# Patient Record
Sex: FEMALE | Race: WHITE | Employment: PART TIME | URBAN - METROPOLITAN AREA
[De-identification: names, ages, dates, MRNs, and addresses within clinical notes are randomized per-mention and may not be internally consistent; named-entity substitution may affect disease eponyms.]

---

## 2018-05-08 ENCOUNTER — TELEPHONE (OUTPATIENT)
Dept: FAMILY MEDICINE CLINIC | Age: 48
End: 2018-05-08

## 2018-05-08 NOTE — TELEPHONE ENCOUNTER
Ila Mata called to schedule a new patient appointment with Dr. Teresa Reese. Ila has a friend that is a patient of Dr. Teresa Reese' (Zoë Vuong)    Since Dr. Teresa Reese is only accepting Family Members of Current Patients and [phone] - approval is needed in order for Ila to be scheduled. Please advise - Best contact number is [phone]. Thank you.

## 2018-06-11 ENCOUNTER — HOSPITAL ENCOUNTER (OUTPATIENT)
Dept: NON INVASIVE DIAGNOSTICS | Age: 48
Discharge: HOME | End: 2018-06-11
Attending: FAMILY MEDICINE | Admitting: FAMILY MEDICINE

## 2018-06-11 DIAGNOSIS — R53.81 OTHER MALAISE: ICD-10-CM

## 2018-08-27 ENCOUNTER — HOSPITAL ENCOUNTER (OUTPATIENT)
Dept: WOUND CARE | Age: 48
Discharge: HOME | End: 2018-08-27
Payer: COMMERCIAL

## 2018-08-27 VITALS
DIASTOLIC BLOOD PRESSURE: 90 MMHG | HEART RATE: 89 BPM | SYSTOLIC BLOOD PRESSURE: 160 MMHG | RESPIRATION RATE: 18 BRPM | HEIGHT: 71 IN | TEMPERATURE: 98.7 F | WEIGHT: 280 LBS | BODY MASS INDEX: 39.2 KG/M2

## 2018-08-27 DIAGNOSIS — I89.0 LYMPHEDEMA OF LEFT LOWER EXTREMITY: ICD-10-CM

## 2018-08-27 PROCEDURE — 99203 OFFICE O/P NEW LOW 30 MIN: CPT | Performed by: SPECIALIST

## 2018-08-27 PROCEDURE — 99203 OFFICE O/P NEW LOW 30 MIN: CPT

## 2018-08-27 RX ORDER — IBUPROFEN 200 MG
400 TABLET ORAL PRN
COMMUNITY
End: 2020-08-17

## 2018-08-27 RX ORDER — OMEPRAZOLE 40 MG/1
40 CAPSULE, DELAYED RELEASE ORAL DAILY
COMMUNITY
Start: 2018-05-30 | End: 2019-01-29 | Stop reason: SDUPTHER

## 2018-08-27 RX ORDER — LOSARTAN POTASSIUM AND HYDROCHLOROTHIAZIDE 12.5; 5 MG/1; MG/1
1 TABLET ORAL DAILY
COMMUNITY
Start: 2018-05-07 | End: 2019-01-29 | Stop reason: SDUPTHER

## 2018-08-27 RX ORDER — CEPHALEXIN 500 MG/1
500 CAPSULE ORAL 2 TIMES DAILY
COMMUNITY
Start: 2018-08-26 | End: 2018-09-03

## 2018-08-27 RX ORDER — ATORVASTATIN CALCIUM 20 MG/1
20 TABLET, FILM COATED ORAL
COMMUNITY
Start: 2018-05-07 | End: 2019-01-29 | Stop reason: SDUPTHER

## 2018-08-27 RX ORDER — FLUTICASONE PROPIONATE 50 MCG
1 SPRAY, SUSPENSION (ML) NASAL PRN
COMMUNITY
Start: 2016-04-18 | End: 2019-09-25

## 2018-08-27 NOTE — PROGRESS NOTES
[address]  Progress Note and Procedure Note      Stacie Perkins  MEDICAL RECORD NUMBER:  [id number]  AGE: 52 y.o. GENDER: female  : 1970  EPISODE DATE:  2018      Subjective:     Chief Complaint   Patient presents with    Wound Check     initial visit         HISTORY of PRESENT ILLNESS HPI     Stacie Perkins is a 52 y.o. female who presents today for wound evaluation. History of Wound Context: Patient was recently discharged from [facility] for cellulitis of the left lower extremity. She has suffered from lymphedema since age 5 since surgery to left groin for benign tumor. She is presently on oral antibiotics. Her leg has no signs of cellulitis but remains more edematous because of her inability to use her lymphedema pumps in the face of recent infection. She is normally in good health. Wound Pain Timing/Severity: none  Quality of pain: N/A  Severity:  0 / 10   Modifying Factors: None  Associated Signs/Symptoms: edema    Wound Identification:  Wound Type: lymphedema  Contributing Factors: obesity        PAST MEDICAL HISTORY        Diagnosis Date    Hypertension        PAST SURGICAL HISTORY    Past Surgical History:   Procedure Laterality Date    BLADDER SURGERY       SECTION      x2    ECTOPIC PREGNANCY SURGERY      LYMPH NODE DISSECTION      removal of lymph node removal left groin    OTHER SURGICAL HISTORY      septum removal in uterus       FAMILY HISTORY    No family history on file. SOCIAL HISTORY    Social History   Substance Use Topics    Smoking status: Not on file    Smokeless tobacco: Not on file    Alcohol use Not on file       ALLERGIES    No Known Allergies    MEDICATIONS    No current outpatient prescriptions on file prior to encounter. No current facility-administered medications on file prior to encounter. REVIEW OF SYSTEMS    Pertinent items are noted in HPI.       Objective:      BP (!) 160/90   Pulse 89   Temp 98.7 °F (37.1 °C) (Oral)   Resp 18   Ht 5' 11\" (1.803 m)   Wt 280 lb (127 kg)   BMI 39.05 kg/m²     PHYSICAL EXAM    General Appearance: alert and oriented to person, place and time, well-developed and well-nourished, in no acute distress  Skin: warm and dry, no rash or erythema  Neck: neck supple and non tender without mass, no thyromegaly or thyroid nodules, no cervical lymphadenopathy   Pulmonary/Chest: clear to auscultation bilaterally- no wheezes, rales or rhonchi, normal air movement, no respiratory distress  Cardiovascular: normal rate, normal S1 and S2, no gallops and no carotid bruits  Abdomen: soft, non-tender, non-distended, normal bowel sounds, no masses or organomegaly  Extremities: no cyanosis, no clubbing and 3+nonpitting  edema,fibrosis LLE extremity extending from toe to groin  left lower extremity. Assessment:     Patient has long standing Stage 2 Lymphedema of the LLE. There are no wounds and cellulitis has resolved. I will try to find patient a PCP for this patient and speak to lymphedema pump representative. Plan:       Treatment Note please see attached Discharge Instructions      Is this Patient a candidate for HBO: No    Weight Management: No. N/A    Written Patient Dismissal Instructions Given       Patient is to return to wound care center in:  1 week(s)    Discharge [facility] Physician Orders and Discharge Instructions  The [address]  [address] [facility], [facility] [address] [facility]  Telephone: [phone]    NAME:  Soledad Mayo  YOB: 1970  MEDICAL RECORD NUMBER:  [id number]  DATE:  8/27/2018       Edema Control:  Apply: [x] Compression Stocking []Right Leg [x]Left Leg     [] Double Layer Medigrip/Single Layer Spandagrip       []Right Leg  []Left Leg        []Low compression 5-10 mm/Hg      []Medium compression 10-20 mm/Hg     []High compression  20-30 mm/Hg    Apply every morning immediately when getting up. They should be applied to affected leg(s) from mid foot to knee making sure to cover the heel. Remove every night before going to bed. [x] Elevate leg(s) above the level of the heart for 30 minutes 4-5 times a day and/or when sitting. [x] Avoid prolonged standing in one place. Lymphedema Therapy: as soon as tolerated start lymphedema pumps   [x] Wear Lymphedema pumps twice a day at settings: as at home      [x]Elevate leg(s) above the level of the heart for 30 minutes 4-5 times a day and/or when sitting. [x] Avoid prolonged standing in one place. Dietary:  Important dietary reminders:  1. Increase Protein intake (i.e. Lean meats, fish, eggs, legumes, and yogurt)  2. No added salt  3. If diabetic, follow a diabetic diet and check glucose prior to meals or as instructed by your physician. Dietary Supplements:  [] Ensure Gleen Amparo [] Fei [] 30ml ProMod/ProStat   [] Other:   Take supplements twice a day or as directed as followed:       Return Appointment:  [] Wound and dressing supply provider:   [] ECF or Home Healthcare:  [] Nurse visit:    [x] Return Appointment: With Dr. Ben Puente as needed    Your nurse  is:  Aliyah     Electronically signed by Monie Sutherland RN on 8/27/2018 at [facility]: Should you experience any significant changes in your wound(s) or have questions about your wound care, please contact the [address] [place] [address] at [phone] Monday-Friday from 8:00 am - 4:30 pm except for Wednesdays which hours are from 8:00 am - 2:00 pm.   If you need help with your wound outside these hours and cannot wait until we are again available, contact your PCP or go to the hospital emergency room. PLEASE NOTE: IF YOU ARE UNABLE TO OBTAIN WOUND SUPPLIES, CONTINUE TO USE THE SUPPLIES YOU HAVE AVAILABLE UNTIL YOU ARE ABLE TO REACH US. IT IS MOST IMPORTANT TO KEEP THE WOUND COVERED AT ALL TIMES.       Physician orders by:     [] Dr Faustina Ley [] Dr Vladimir Richardson    [] Dr Minnie Tuttle     [] Dr Jackie Vincent   [x] Dr Germaine Whitten  [] Dr Dasia Vargas  [] Dr Froylan Hudson       Physician Signature:__________________________________        The information contained in the After Visit Summary has been reviewed with me, the patient and/or responsible adult, by my health care provider(s). I had the opportunity to ask questions regarding this information.   I have elected to receive;      [] Patient unable to sign Discharge Instructions given to ECF/Transportation/POA        Electronically signed by Selwyn Estrada MD on 8/27/2018 at 4:23 PM

## 2018-08-28 PROBLEM — I89.0 LYMPHEDEMA OF LEFT LOWER EXTREMITY: Status: ACTIVE | Noted: 2018-08-28

## 2018-08-28 NOTE — TELEPHONE ENCOUNTER
Patient called to schedule a new patient appointment and I informed her that Dr. Lia Blank was not accepting anymore new patients. Patient states Dr. Lia Blank spoke with Dr. Behzad Horta and gave an okay. Please advise. Thanks.

## 2018-09-17 ENCOUNTER — OFFICE VISIT (OUTPATIENT)
Dept: FAMILY MEDICINE CLINIC | Age: 48
End: 2018-09-17

## 2018-09-17 VITALS
RESPIRATION RATE: 18 BRPM | DIASTOLIC BLOOD PRESSURE: 78 MMHG | WEIGHT: 265.2 LBS | OXYGEN SATURATION: 98 % | HEART RATE: 77 BPM | SYSTOLIC BLOOD PRESSURE: 124 MMHG | HEIGHT: 70 IN | TEMPERATURE: 98.3 F | BODY MASS INDEX: 37.97 KG/M2

## 2018-09-17 DIAGNOSIS — Z23 NEED FOR INFLUENZA VACCINATION: ICD-10-CM

## 2018-09-17 DIAGNOSIS — N30.00 ACUTE CYSTITIS WITHOUT HEMATURIA: ICD-10-CM

## 2018-09-17 DIAGNOSIS — R30.0 DYSURIA: ICD-10-CM

## 2018-09-17 DIAGNOSIS — I10 ESSENTIAL HYPERTENSION, BENIGN: Primary | ICD-10-CM

## 2018-09-17 DIAGNOSIS — Z83.49 FH: HEMOCHROMATOSIS: ICD-10-CM

## 2018-09-17 DIAGNOSIS — I89.0 LYMPHEDEMA OF LEFT LOWER EXTREMITY: ICD-10-CM

## 2018-09-17 PROCEDURE — 90686 IIV4 VACC NO PRSV 0.5 ML IM: CPT | Performed by: FAMILY MEDICINE

## 2018-09-17 PROCEDURE — 99203 OFFICE O/P NEW LOW 30 MIN: CPT | Performed by: FAMILY MEDICINE

## 2018-09-17 PROCEDURE — 90471 IMMUNIZATION ADMIN: CPT | Performed by: FAMILY MEDICINE

## 2018-09-17 RX ORDER — SULFAMETHOXAZOLE AND TRIMETHOPRIM 800; 160 MG/1; MG/1
1 TABLET ORAL 2 TIMES DAILY
Qty: 10 TABLET | Refills: 0 | Status: SHIPPED | OUTPATIENT
Start: 2018-09-17 | End: 2018-09-19 | Stop reason: SINTOL

## 2018-09-17 RX ORDER — NITROFURANTOIN 25; 75 MG/1; MG/1
100 CAPSULE ORAL 2 TIMES DAILY
COMMUNITY
End: 2018-11-20 | Stop reason: ALTCHOICE

## 2018-09-17 ASSESSMENT — ENCOUNTER SYMPTOMS
ABDOMINAL PAIN: 0
ANAL BLEEDING: 0
SHORTNESS OF BREATH: 0
NAUSEA: 0
CONSTIPATION: 0
WHEEZING: 0
VOICE CHANGE: 0
CHOKING: 0
DIARRHEA: 0
CHEST TIGHTNESS: 0
BLOOD IN STOOL: 0
SORE THROAT: 0
TROUBLE SWALLOWING: 0

## 2018-09-17 ASSESSMENT — PATIENT HEALTH QUESTIONNAIRE - PHQ9
1. LITTLE INTEREST OR PLEASURE IN DOING THINGS: 0
SUM OF ALL RESPONSES TO PHQ QUESTIONS 1-9: 0
SUM OF ALL RESPONSES TO PHQ9 QUESTIONS 1 & 2: 0
2. FEELING DOWN, DEPRESSED OR HOPELESS: 0
SUM OF ALL RESPONSES TO PHQ QUESTIONS 1-9: 0

## 2018-09-17 NOTE — PROGRESS NOTES
Subjective:      Patient ID: Nitesh Gonzalez is a 52 y.o. female is here to establish as a new pt. She has lymphedema, hyperlipidemia,  and HTN    HPI  Is originally from [facility]). Has 15 and 14 yo kids. . Has PAP annually. Has appt in October. Dr. Deanna Majano    Had lipoma removed from the left groin in [place] Unfortunately a lymph node dissection was also done and she has chronic lymphedema. Has been well managed for many years with massage and compression stocking. Was admitted to [facility] on 8/23 with celitis of the left leg. Resolved. Was off antib about 10 days when developed dysuria, urgency. No vaginal discharge or itching. She did a home dip and it was + for UTI. She did an on-line visit and was treated with [facility]. On Macrobid x 3 days. Is not feeling any better. Weight is down 15 lbs   Was volume overloaded at discharge from the hospital.     Has mild heaviness in her chest since in the hospital.  She thinks she has a bit of a cold. Nasal congestion, PND. No cough fever. Chest pain is not worse with exertion. Hypertension: Patient here for follow-up of elevated blood pressure. She is exercising and is adherent to low salt diet. Blood pressure is well controlled at home. 120/80. Cardiac symptoms none. Patient denies irregular heart beat, lower extremity edema, near-syncope and paroxysmal nocturnal dyspnea. Cardiovascular risk factors: dyslipidemia, hypertension and obesity (BMI >= 30 kg/m2). Use of agents associated with hypertension: none. History of target organ damage: none. Hyperlipidemia:  No new myalgias or GI upset on atorvastatin (Lipitor). Medication compliance: compliant all of the time. Patient is  following a low fat, low cholesterol diet. She is  exercising regularly. Had normal labs in July 2018.      No results found for: CHOL, TRIG, HDL, LDLCALC, LDLDIRECT  No results found for: ALT, AST         Health Maintenance   Topic Date Due    Potassium unexpected weight change. HENT: Positive for tinnitus. Negative for congestion, hearing loss, nosebleeds, sore throat, trouble swallowing and voice change. Occ tinnitus   Eyes: Negative for visual disturbance. Respiratory: Negative for choking, chest tightness, shortness of breath and wheezing. Cardiovascular: Negative for chest pain, palpitations and leg swelling. Gastrointestinal: Negative for abdominal pain, anal bleeding, blood in stool, constipation, diarrhea and nausea. Genitourinary: Positive for dysuria and urgency. Negative for flank pain, frequency, hematuria, pelvic pain, vaginal bleeding and vaginal discharge. Musculoskeletal: Positive for arthralgias. Negative for myalgias. Mild aches in the fingers. Has nodules in the DIP. Mild sxs. Skin: Negative for rash. Neurological: Negative for weakness, light-headedness and headaches. Hematological: Negative for adenopathy. Does not bruise/bleed easily. Psychiatric/Behavioral: Negative for dysphoric mood and sleep disturbance. The patient is not nervous/anxious. Objective:   Physical Exam  .  Vitals:    09/17/18 1247   BP: (!) 149/87   Pulse: 77   Resp: 18   Temp: 98.3 °F (36.8 °C)   SpO2: 98%       Physical Exam  NAD   mood and affect are normal.   Skin is warm and dry. Well hydrated, no rash. Ear exam - bilateral normal, TM intact without perforation or effusion, external canal normal. No significant ceruminosis noted. Nasal exam; septum midline, no deformities, nares patent, normal mucosa without swelling, no polyps, no bleeding. Pharynx normal, no oral lesions, dentition in good repair. The neck is supple and free of adenopathy or masses, the thyroid is normal without enlargement or nodules. Chest is clear, no wheezing or rales. Normal symmetric air entry throughout both lung fields.    Heart regular with normal rate, no murmer or gallop  PMI is not displaced, no heave or thrill  The abdomen is soft without

## 2018-09-18 ENCOUNTER — PATIENT MESSAGE (OUTPATIENT)
Dept: FAMILY MEDICINE CLINIC | Age: 48
End: 2018-09-18

## 2018-09-19 LAB — URINE CULTURE, ROUTINE: NORMAL

## 2018-09-19 RX ORDER — CEPHALEXIN 500 MG/1
500 CAPSULE ORAL 3 TIMES DAILY
Qty: 15 CAPSULE | Refills: 0 | Status: SHIPPED | OUTPATIENT
Start: 2018-09-19 | End: 2018-09-24

## 2018-11-20 ENCOUNTER — OFFICE VISIT (OUTPATIENT)
Dept: FAMILY MEDICINE CLINIC | Age: 48
End: 2018-11-20
Payer: COMMERCIAL

## 2018-11-20 VITALS
HEART RATE: 88 BPM | RESPIRATION RATE: 12 BRPM | BODY MASS INDEX: 37.52 KG/M2 | SYSTOLIC BLOOD PRESSURE: 153 MMHG | WEIGHT: 268 LBS | HEIGHT: 71 IN | DIASTOLIC BLOOD PRESSURE: 88 MMHG | TEMPERATURE: 97.7 F | OXYGEN SATURATION: 98 %

## 2018-11-20 DIAGNOSIS — R07.89 CHEST TIGHTNESS: Primary | ICD-10-CM

## 2018-11-20 DIAGNOSIS — R00.2 PALPITATIONS: ICD-10-CM

## 2018-11-20 DIAGNOSIS — S29.019A THORACIC MYOFASCIAL STRAIN, INITIAL ENCOUNTER: ICD-10-CM

## 2018-11-20 DIAGNOSIS — R06.09 DYSPNEA ON EXERTION: ICD-10-CM

## 2018-11-20 DIAGNOSIS — S39.012A STRAIN OF LUMBAR REGION, INITIAL ENCOUNTER: ICD-10-CM

## 2018-11-20 DIAGNOSIS — D50.9 IRON DEFICIENCY ANEMIA, UNSPECIFIED IRON DEFICIENCY ANEMIA TYPE: ICD-10-CM

## 2018-11-20 DIAGNOSIS — Z13.220 LIPID SCREENING: ICD-10-CM

## 2018-11-20 PROCEDURE — 93000 ELECTROCARDIOGRAM COMPLETE: CPT | Performed by: FAMILY MEDICINE

## 2018-11-20 PROCEDURE — 99214 OFFICE O/P EST MOD 30 MIN: CPT | Performed by: FAMILY MEDICINE

## 2018-11-20 NOTE — PATIENT INSTRUCTIONS
medical condition or this instruction, always ask your healthcare professional. [address] any warranty or liability for your use of this information. Healthy Upper Back: Exercises  Your Care Instructions  Here are some examples of exercises for your upper back. Start each exercise slowly. Ease off the exercise if you start to have pain. Your doctor or physical therapist will tell you when you can start these exercises and which ones will work best for you. How to do the exercises  Lower neck and upper back stretch    6. Stretch your arms out in front of your body. Clasp one hand on top of your other hand. 7. Gently reach out so that you feel your shoulder blades stretching away from each other. 8. Gently bend your head forward. 9. Hold for 15 to 30 seconds. 10. Repeat 2 to 4 times. Midback stretch    5. Kneel on the floor, and sit back on your ankles. 6. Lean forward, place your hands on the floor, and stretch your arms out in front of you. Rest your head between your arms. 7. Gently push your chest toward the floor, reaching as far in front of you as possible. 8. Hold for 15 to 30 seconds. 9. Repeat 2 to 4 times. Shoulder rolls    5. Sit comfortably with your feet shoulder-width apart. You can also do this exercise while standing. 6. Roll your shoulders up, then back, and then down in a smooth, circular motion. 7. Repeat 2 to 4 times. Wall push-up    5. Stand against a wall with your feet about 12 to 24 inches back from the wall. If you feel any pain when you do this exercise, stand closer to the wall. 6. Place your hands on the wall slightly wider apart than your shoulders, and lean forward. 7. Gently lean your body toward the wall. Then push back to your starting position. Keep the motion smooth and controlled. 8. Repeat 8 to 12 times. Resisted shoulder blade squeeze    5. Sit or stand, holding the band in both hands in front of you.  Keep your elbows close to your sides, bent at a 90-degree angle. Your palms should face up. 6. Squeeze your shoulder blades together, and move your arms to the outside, stretching the band. Be sure to keep your elbows at your sides while you do this. 7. Relax. 8. Repeat 8 to 12 times. Resisted rows    6. Put the band around a solid object, such as a bedpost, at about waist level. Hold one end of the band in each hand. 7. With your elbows at your sides and bent to 90 degrees, pull the band back to move your shoulder blades toward each other. Return to the starting position. 8. Repeat 8 to 12 times. Follow-up care is a key part of your treatment and safety. Be sure to make and go to all appointments, and call your doctor if you are having problems. It's also a good idea to know your test results and keep a list of the medicines you take. Where can you learn more? Go to https://[employer]. org and sign in to your [employer] account. Enter K446 in the Datamolino box to learn more about \"Healthy Upper Back: Exercises. \"     If you do not have an account, please click on the \"Sign Up Now\" link. Current as of: November 29, 2017  Content Version: 11.8  © 1570-8329 Healthwise, Incorporated. Care instructions adapted under license by [facility] ([facility]). If you have questions about a medical condition or this instruction, always ask your healthcare professional. [place] [address] any warranty or liability for your use of this information.

## 2018-11-23 DIAGNOSIS — Z13.220 LIPID SCREENING: ICD-10-CM

## 2018-11-23 DIAGNOSIS — R06.09 DYSPNEA ON EXERTION: ICD-10-CM

## 2018-11-23 DIAGNOSIS — D50.9 IRON DEFICIENCY ANEMIA, UNSPECIFIED IRON DEFICIENCY ANEMIA TYPE: ICD-10-CM

## 2018-11-23 DIAGNOSIS — R00.2 PALPITATIONS: ICD-10-CM

## 2018-11-23 LAB
A/G RATIO: 1.7 (ref 1.1–2.2)
ALBUMIN SERPL-MCNC: 4.1 G/DL (ref 3.4–5)
ALP BLD-CCNC: 60 U/L (ref 40–129)
ALT SERPL-CCNC: 49 U/L (ref 10–40)
ANION GAP SERPL CALCULATED.3IONS-SCNC: 13 MMOL/L (ref 3–16)
AST SERPL-CCNC: 25 U/L (ref 15–37)
BILIRUB SERPL-MCNC: 0.3 MG/DL (ref 0–1)
BUN BLDV-MCNC: 16 MG/DL (ref 7–20)
CALCIUM SERPL-MCNC: 9.3 MG/DL (ref 8.3–10.6)
CHLORIDE BLD-SCNC: 106 MMOL/L (ref 99–110)
CHOLESTEROL, TOTAL: 187 MG/DL (ref 0–199)
CO2: 23 MMOL/L (ref 21–32)
CREAT SERPL-MCNC: 0.6 MG/DL (ref 0.6–1.1)
GFR AFRICAN AMERICAN: >60
GFR NON-AFRICAN AMERICAN: >60
GLOBULIN: 2.4 G/DL
GLUCOSE BLD-MCNC: 116 MG/DL (ref 70–99)
HCT VFR BLD CALC: 45.5 % (ref 36–48)
HDLC SERPL-MCNC: 43 MG/DL (ref 40–60)
HEMOGLOBIN: 15.5 G/DL (ref 12–16)
IRON SATURATION: 41 % (ref 15–50)
IRON: 104 UG/DL (ref 37–145)
LDL CHOLESTEROL CALCULATED: 94 MG/DL
MCH RBC QN AUTO: 31.4 PG (ref 26–34)
MCHC RBC AUTO-ENTMCNC: 34.2 G/DL (ref 31–36)
MCV RBC AUTO: 91.9 FL (ref 80–100)
PDW BLD-RTO: 13 % (ref 12.4–15.4)
PLATELET # BLD: 195 K/UL (ref 135–450)
PMV BLD AUTO: 10.1 FL (ref 5–10.5)
POTASSIUM SERPL-SCNC: 4.2 MMOL/L (ref 3.5–5.1)
RBC # BLD: 4.95 M/UL (ref 4–5.2)
SODIUM BLD-SCNC: 142 MMOL/L (ref 136–145)
TOTAL IRON BINDING CAPACITY: 253 UG/DL (ref 260–445)
TOTAL PROTEIN: 6.5 G/DL (ref 6.4–8.2)
TRIGL SERPL-MCNC: 249 MG/DL (ref 0–150)
TSH REFLEX: 2.32 UIU/ML (ref 0.27–4.2)
VLDLC SERPL CALC-MCNC: 50 MG/DL
WBC # BLD: 8 K/UL (ref 4–11)

## 2018-11-25 DIAGNOSIS — R74.01 ELEVATED ALT MEASUREMENT: Primary | ICD-10-CM

## 2018-11-26 DIAGNOSIS — R06.09 DYSPNEA ON EXERTION: Primary | ICD-10-CM

## 2018-11-28 ENCOUNTER — HOSPITAL ENCOUNTER (OUTPATIENT)
Dept: NON INVASIVE DIAGNOSTICS | Age: 48
Discharge: HOME | End: 2018-11-28
Payer: COMMERCIAL

## 2018-11-28 DIAGNOSIS — R00.2 PALPITATIONS: ICD-10-CM

## 2018-11-28 DIAGNOSIS — D50.9 IRON DEFICIENCY ANEMIA, UNSPECIFIED IRON DEFICIENCY ANEMIA TYPE: ICD-10-CM

## 2018-11-28 PROCEDURE — 93226 XTRNL ECG REC<48 HR SCAN A/R: CPT

## 2018-11-28 PROCEDURE — 93225 XTRNL ECG REC<48 HRS REC: CPT

## 2018-11-30 LAB
ACQUISITION DURATION: NORMAL S
AVERAGE HEART RATE: 87 BPM
EKG DIAGNOSIS: NORMAL
HOLTER MAX HEART RATE: 147 BPM
HOOKUP DATE: NORMAL
HOOKUP TIME: NORMAL
MAX HEART RATE TIME/DATE: NORMAL
MIN HEART RATE TIME/DATE: NORMAL
MIN HEART RATE: 61 BPM
NUMBER OF QRS COMPLEXES: NORMAL
NUMBER OF SUPRAVENTRICULAR BEATS IN RUNS: 0
NUMBER OF SUPRAVENTRICULAR COUPLETS: 0
NUMBER OF SUPRAVENTRICULAR ECTOPICS: 4
NUMBER OF SUPRAVENTRICULAR ISOLATED BEATS: 4
NUMBER OF SUPRAVENTRICULAR RUNS: 0
NUMBER OF VENTRICULAR BEATS IN RUNS: 0
NUMBER OF VENTRICULAR BIGEMINAL CYCLES: 0
NUMBER OF VENTRICULAR COUPLETS: 0
NUMBER OF VENTRICULAR ECTOPICS: 5
NUMBER OF VENTRICULAR ISOLATED BEATS: 5
NUMBER OF VENTRICULAR RUNS: 0

## 2018-12-11 ENCOUNTER — HOSPITAL ENCOUNTER (OUTPATIENT)
Dept: PULMONOLOGY | Age: 48
Discharge: HOME | End: 2018-12-11
Payer: COMMERCIAL

## 2018-12-11 DIAGNOSIS — R06.09 DYSPNEA ON EXERTION: ICD-10-CM

## 2018-12-11 PROCEDURE — 94664 DEMO&/EVAL PT USE INHALER: CPT

## 2018-12-11 PROCEDURE — 6360000002 HC RX W HCPCS: Performed by: FAMILY MEDICINE

## 2018-12-11 PROCEDURE — 94070 EVALUATION OF WHEEZING: CPT

## 2018-12-11 PROCEDURE — 94726 PLETHYSMOGRAPHY LUNG VOLUMES: CPT

## 2018-12-11 PROCEDURE — 94729 DIFFUSING CAPACITY: CPT

## 2018-12-11 PROCEDURE — 94010 BREATHING CAPACITY TEST: CPT

## 2018-12-11 PROCEDURE — 94640 AIRWAY INHALATION TREATMENT: CPT

## 2018-12-11 PROCEDURE — 94760 N-INVAS EAR/PLS OXIMETRY 1: CPT

## 2018-12-11 RX ORDER — ALBUTEROL SULFATE 2.5 MG/3ML
2.5 SOLUTION RESPIRATORY (INHALATION) EVERY 10 MIN PRN
Status: DISCONTINUED | OUTPATIENT
Start: 2018-12-11 | End: 2018-12-12 | Stop reason: HOSPADM

## 2018-12-11 RX ADMIN — METHACHOLINE CHLORIDE 100 MG: 100 POWDER, FOR SOLUTION RESPIRATORY (INHALATION) at 15:29

## 2018-12-11 RX ADMIN — ALBUTEROL SULFATE 2.5 MG: 2.5 SOLUTION RESPIRATORY (INHALATION) at 15:29

## 2019-01-28 ENCOUNTER — PATIENT MESSAGE (OUTPATIENT)
Dept: FAMILY MEDICINE CLINIC | Age: 49
End: 2019-01-28

## 2019-01-28 DIAGNOSIS — E78.00 PURE HYPERCHOLESTEROLEMIA: ICD-10-CM

## 2019-01-28 DIAGNOSIS — K21.9 GASTROESOPHAGEAL REFLUX DISEASE, ESOPHAGITIS PRESENCE NOT SPECIFIED: ICD-10-CM

## 2019-01-28 DIAGNOSIS — E55.9 VITAMIN D DEFICIENCY: ICD-10-CM

## 2019-01-28 DIAGNOSIS — I10 ESSENTIAL HYPERTENSION: Primary | ICD-10-CM

## 2019-01-29 PROBLEM — E78.00 PURE HYPERCHOLESTEROLEMIA: Status: ACTIVE | Noted: 2019-01-29

## 2019-01-29 PROBLEM — K21.9 GASTROESOPHAGEAL REFLUX DISEASE: Status: ACTIVE | Noted: 2019-01-29

## 2019-01-29 PROBLEM — E55.9 VITAMIN D DEFICIENCY: Status: ACTIVE | Noted: 2019-01-29

## 2019-01-29 PROBLEM — I10 ESSENTIAL HYPERTENSION: Status: ACTIVE | Noted: 2019-01-29

## 2019-01-29 RX ORDER — LOSARTAN POTASSIUM AND HYDROCHLOROTHIAZIDE 12.5; 5 MG/1; MG/1
1 TABLET ORAL DAILY
Qty: 90 TABLET | Refills: 1 | Status: SHIPPED | OUTPATIENT
Start: 2019-01-29 | End: 2019-03-07 | Stop reason: DRUGHIGH

## 2019-01-29 RX ORDER — OMEPRAZOLE 40 MG/1
40 CAPSULE, DELAYED RELEASE ORAL DAILY
Qty: 90 CAPSULE | Refills: 1 | Status: SHIPPED | OUTPATIENT
Start: 2019-01-29 | End: 2019-04-29 | Stop reason: SDUPTHER

## 2019-01-29 RX ORDER — ATORVASTATIN CALCIUM 20 MG/1
20 TABLET, FILM COATED ORAL DAILY
Qty: 90 TABLET | Refills: 1 | Status: SHIPPED | OUTPATIENT
Start: 2019-01-29 | End: 2019-09-29 | Stop reason: SDUPTHER

## 2019-02-07 DIAGNOSIS — R06.09 DYSPNEA ON EXERTION: ICD-10-CM

## 2019-02-07 DIAGNOSIS — R00.2 PALPITATIONS: Primary | ICD-10-CM

## 2019-03-06 ENCOUNTER — NURSE TRIAGE (OUTPATIENT)
Dept: OTHER | Facility: CLINIC | Age: 49
End: 2019-03-06

## 2019-03-06 ENCOUNTER — APPOINTMENT (OUTPATIENT)
Dept: VASCULAR LAB | Age: 49
End: 2019-03-06
Payer: COMMERCIAL

## 2019-03-06 ENCOUNTER — APPOINTMENT (OUTPATIENT)
Dept: GENERAL RADIOLOGY | Age: 49
End: 2019-03-06
Payer: COMMERCIAL

## 2019-03-06 ENCOUNTER — HOSPITAL ENCOUNTER (EMERGENCY)
Age: 49
Discharge: HOME | End: 2019-03-06
Attending: EMERGENCY MEDICINE
Payer: COMMERCIAL

## 2019-03-06 VITALS
TEMPERATURE: 98.1 F | OXYGEN SATURATION: 97 % | SYSTOLIC BLOOD PRESSURE: 154 MMHG | DIASTOLIC BLOOD PRESSURE: 91 MMHG | RESPIRATION RATE: 21 BRPM | WEIGHT: 259.2 LBS | HEIGHT: 71 IN | BODY MASS INDEX: 36.29 KG/M2 | HEART RATE: 75 BPM

## 2019-03-06 DIAGNOSIS — R00.2 PALPITATIONS: Primary | ICD-10-CM

## 2019-03-06 LAB
ANION GAP SERPL CALCULATED.3IONS-SCNC: 15 MMOL/L (ref 3–16)
BASOPHILS ABSOLUTE: 0 K/UL (ref 0–0.2)
BASOPHILS RELATIVE PERCENT: 0.3 %
BUN BLDV-MCNC: 17 MG/DL (ref 7–20)
CALCIUM SERPL-MCNC: 9.3 MG/DL (ref 8.3–10.6)
CHLORIDE BLD-SCNC: 101 MMOL/L (ref 99–110)
CO2: 22 MMOL/L (ref 21–32)
CREAT SERPL-MCNC: 0.6 MG/DL (ref 0.6–1.1)
EKG ATRIAL RATE: 93 BPM
EKG DIAGNOSIS: NORMAL
EKG P AXIS: 62 DEGREES
EKG P-R INTERVAL: 160 MS
EKG Q-T INTERVAL: 376 MS
EKG QRS DURATION: 100 MS
EKG QTC CALCULATION (BAZETT): 467 MS
EKG R AXIS: 25 DEGREES
EKG T AXIS: 53 DEGREES
EKG VENTRICULAR RATE: 93 BPM
EOSINOPHILS ABSOLUTE: 0.1 K/UL (ref 0–0.6)
EOSINOPHILS RELATIVE PERCENT: 0.9 %
GFR AFRICAN AMERICAN: >60
GFR NON-AFRICAN AMERICAN: >60
GLUCOSE BLD-MCNC: 116 MG/DL (ref 70–99)
HCT VFR BLD CALC: 43.6 % (ref 36–48)
HEMOGLOBIN: 14.8 G/DL (ref 12–16)
LYMPHOCYTES ABSOLUTE: 1.8 K/UL (ref 1–5.1)
LYMPHOCYTES RELATIVE PERCENT: 22.5 %
MCH RBC QN AUTO: 31.5 PG (ref 26–34)
MCHC RBC AUTO-ENTMCNC: 34 G/DL (ref 31–36)
MCV RBC AUTO: 92.6 FL (ref 80–100)
MONOCYTES ABSOLUTE: 0.7 K/UL (ref 0–1.3)
MONOCYTES RELATIVE PERCENT: 9.1 %
NEUTROPHILS ABSOLUTE: 5.5 K/UL (ref 1.7–7.7)
NEUTROPHILS RELATIVE PERCENT: 67.2 %
PDW BLD-RTO: 12.6 % (ref 12.4–15.4)
PLATELET # BLD: 216 K/UL (ref 135–450)
PMV BLD AUTO: 8.5 FL (ref 5–10.5)
POTASSIUM SERPL-SCNC: 3.6 MMOL/L (ref 3.5–5.1)
PRO-BNP: 58 PG/ML (ref 0–124)
RBC # BLD: 4.71 M/UL (ref 4–5.2)
SODIUM BLD-SCNC: 138 MMOL/L (ref 136–145)
T4 TOTAL: 6.1 UG/DL (ref 4.5–10.9)
TROPONIN: <0.01 NG/ML
TROPONIN: <0.01 NG/ML
TSH SERPL DL<=0.05 MIU/L-ACNC: 2.46 UIU/ML (ref 0.27–4.2)
WBC # BLD: 8.2 K/UL (ref 4–11)

## 2019-03-06 PROCEDURE — 71046 X-RAY EXAM CHEST 2 VIEWS: CPT

## 2019-03-06 PROCEDURE — 93970 EXTREMITY STUDY: CPT

## 2019-03-06 PROCEDURE — 83880 ASSAY OF NATRIURETIC PEPTIDE: CPT

## 2019-03-06 PROCEDURE — 84443 ASSAY THYROID STIM HORMONE: CPT

## 2019-03-06 PROCEDURE — 84436 ASSAY OF TOTAL THYROXINE: CPT

## 2019-03-06 PROCEDURE — 85025 COMPLETE CBC W/AUTO DIFF WBC: CPT

## 2019-03-06 PROCEDURE — 84484 ASSAY OF TROPONIN QUANT: CPT

## 2019-03-06 PROCEDURE — 80048 BASIC METABOLIC PNL TOTAL CA: CPT

## 2019-03-06 PROCEDURE — 99285 EMERGENCY DEPT VISIT HI MDM: CPT

## 2019-03-06 PROCEDURE — 36415 COLL VENOUS BLD VENIPUNCTURE: CPT

## 2019-03-06 PROCEDURE — 93005 ELECTROCARDIOGRAM TRACING: CPT | Performed by: PHYSICIAN ASSISTANT

## 2019-03-06 RX ORDER — LORATADINE 10 MG/1
10 TABLET ORAL DAILY
COMMUNITY

## 2019-03-06 ASSESSMENT — ENCOUNTER SYMPTOMS
DIARRHEA: 0
WHEEZING: 0
NAUSEA: 1
COUGH: 0
ABDOMINAL PAIN: 0
VOMITING: 0
SHORTNESS OF BREATH: 1
CHEST TIGHTNESS: 0

## 2019-03-07 ENCOUNTER — OFFICE VISIT (OUTPATIENT)
Dept: FAMILY MEDICINE CLINIC | Age: 49
End: 2019-03-07
Payer: COMMERCIAL

## 2019-03-07 VITALS
HEART RATE: 78 BPM | OXYGEN SATURATION: 97 % | DIASTOLIC BLOOD PRESSURE: 91 MMHG | WEIGHT: 264 LBS | SYSTOLIC BLOOD PRESSURE: 148 MMHG | BODY MASS INDEX: 36.82 KG/M2 | TEMPERATURE: 98.1 F

## 2019-03-07 DIAGNOSIS — R00.0 TACHYCARDIA: Primary | ICD-10-CM

## 2019-03-07 DIAGNOSIS — R73.9 HYPERGLYCEMIA: ICD-10-CM

## 2019-03-07 DIAGNOSIS — I10 ESSENTIAL HYPERTENSION: ICD-10-CM

## 2019-03-07 PROCEDURE — 99214 OFFICE O/P EST MOD 30 MIN: CPT | Performed by: FAMILY MEDICINE

## 2019-03-07 RX ORDER — LOSARTAN POTASSIUM AND HYDROCHLOROTHIAZIDE 25; 100 MG/1; MG/1
1 TABLET ORAL DAILY
Qty: 90 TABLET | Refills: 0 | Status: SHIPPED | OUTPATIENT
Start: 2019-03-07 | End: 2019-04-29 | Stop reason: SDUPTHER

## 2019-03-18 DIAGNOSIS — R73.9 HYPERGLYCEMIA: ICD-10-CM

## 2019-03-19 LAB
ESTIMATED AVERAGE GLUCOSE: 116.9 MG/DL
HBA1C MFR BLD: 5.7 %

## 2019-03-20 LAB
ACQUISITION DURATION: NORMAL S
AVERAGE HEART RATE: 83 BPM
EKG DIAGNOSIS: NORMAL
HOLTER MAX HEART RATE: 133 BPM
HOOKUP DATE: NORMAL
HOOKUP TIME: NORMAL
MAX HEART RATE TIME/DATE: NORMAL
MIN HEART RATE TIME/DATE: NORMAL
MIN HEART RATE: 61 BPM
NUMBER OF QRS COMPLEXES: NORMAL
NUMBER OF SUPRAVENTRICULAR BEATS IN RUNS: 0
NUMBER OF SUPRAVENTRICULAR COUPLETS: 0
NUMBER OF SUPRAVENTRICULAR ECTOPICS: 5
NUMBER OF SUPRAVENTRICULAR ISOLATED BEATS: 5
NUMBER OF SUPRAVENTRICULAR RUNS: 0
NUMBER OF VENTRICULAR BEATS IN RUNS: 0
NUMBER OF VENTRICULAR BIGEMINAL CYCLES: 0
NUMBER OF VENTRICULAR COUPLETS: 0
NUMBER OF VENTRICULAR ECTOPICS: 3
NUMBER OF VENTRICULAR ISOLATED BEATS: 3
NUMBER OF VENTRICULAR RUNS: 0

## 2019-04-24 ENCOUNTER — E-VISIT (OUTPATIENT)
Dept: FAMILY MEDICINE CLINIC | Age: 49
End: 2019-04-24
Payer: COMMERCIAL

## 2019-04-24 DIAGNOSIS — N30.00 ACUTE CYSTITIS WITHOUT HEMATURIA: Primary | ICD-10-CM

## 2019-04-24 PROCEDURE — 99444 PR PHYSICIAN ONLINE EVALUATION & MANAGEMENT SERVICE: CPT | Performed by: FAMILY MEDICINE

## 2019-04-24 RX ORDER — NITROFURANTOIN 25; 75 MG/1; MG/1
100 CAPSULE ORAL 2 TIMES DAILY
Qty: 14 CAPSULE | Refills: 0 | Status: SHIPPED | OUTPATIENT
Start: 2019-04-24 | End: 2019-05-01

## 2019-04-29 DIAGNOSIS — K21.9 GASTROESOPHAGEAL REFLUX DISEASE, ESOPHAGITIS PRESENCE NOT SPECIFIED: ICD-10-CM

## 2019-04-29 DIAGNOSIS — I10 ESSENTIAL HYPERTENSION: ICD-10-CM

## 2019-04-29 RX ORDER — LOSARTAN POTASSIUM AND HYDROCHLOROTHIAZIDE 25; 100 MG/1; MG/1
1 TABLET ORAL DAILY
Qty: 90 TABLET | Refills: 0 | Status: SHIPPED | OUTPATIENT
Start: 2019-04-29 | End: 2019-09-01 | Stop reason: SDUPTHER

## 2019-04-29 RX ORDER — OMEPRAZOLE 40 MG/1
40 CAPSULE, DELAYED RELEASE ORAL DAILY
Qty: 90 CAPSULE | Refills: 1 | Status: SHIPPED | OUTPATIENT
Start: 2019-04-29 | End: 2019-09-01 | Stop reason: SDUPTHER

## 2019-06-04 ENCOUNTER — TELEPHONE (OUTPATIENT)
Dept: FAMILY MEDICINE CLINIC | Age: 49
End: 2019-06-04

## 2019-06-04 RX ORDER — AZITHROMYCIN 250 MG/1
TABLET, FILM COATED ORAL
Qty: 1 PACKET | Refills: 0 | Status: SHIPPED | OUTPATIENT
Start: 2019-06-04 | End: 2019-06-14

## 2019-07-24 ENCOUNTER — OFFICE VISIT (OUTPATIENT)
Dept: FAMILY MEDICINE CLINIC | Age: 49
End: 2019-07-24
Payer: COMMERCIAL

## 2019-07-24 VITALS
DIASTOLIC BLOOD PRESSURE: 80 MMHG | OXYGEN SATURATION: 97 % | HEART RATE: 92 BPM | TEMPERATURE: 97.8 F | WEIGHT: 261.5 LBS | BODY MASS INDEX: 36.47 KG/M2 | SYSTOLIC BLOOD PRESSURE: 140 MMHG | RESPIRATION RATE: 16 BRPM

## 2019-07-24 DIAGNOSIS — S39.012A STRAIN OF LUMBAR REGION, INITIAL ENCOUNTER: ICD-10-CM

## 2019-07-24 DIAGNOSIS — Z12.39 BREAST CANCER SCREENING: ICD-10-CM

## 2019-07-24 DIAGNOSIS — R19.8 CHANGE IN BOWEL FUNCTION: Primary | ICD-10-CM

## 2019-07-24 DIAGNOSIS — R19.8 CHANGE IN BOWEL FUNCTION: ICD-10-CM

## 2019-07-24 LAB
A/G RATIO: 2 (ref 1.1–2.2)
ALBUMIN SERPL-MCNC: 4.8 G/DL (ref 3.4–5)
ALP BLD-CCNC: 58 U/L (ref 40–129)
ALT SERPL-CCNC: 40 U/L (ref 10–40)
ANION GAP SERPL CALCULATED.3IONS-SCNC: 15 MMOL/L (ref 3–16)
AST SERPL-CCNC: 26 U/L (ref 15–37)
BASOPHILS ABSOLUTE: 0 K/UL (ref 0–0.2)
BASOPHILS RELATIVE PERCENT: 0.4 %
BILIRUB SERPL-MCNC: 0.3 MG/DL (ref 0–1)
BUN BLDV-MCNC: 14 MG/DL (ref 7–20)
CALCIUM SERPL-MCNC: 9.4 MG/DL (ref 8.3–10.6)
CHLORIDE BLD-SCNC: 99 MMOL/L (ref 99–110)
CO2: 24 MMOL/L (ref 21–32)
CREAT SERPL-MCNC: 0.7 MG/DL (ref 0.6–1.1)
EOSINOPHILS ABSOLUTE: 0.1 K/UL (ref 0–0.6)
EOSINOPHILS RELATIVE PERCENT: 0.7 %
GFR AFRICAN AMERICAN: >60
GFR NON-AFRICAN AMERICAN: >60
GLOBULIN: 2.4 G/DL
GLUCOSE BLD-MCNC: 104 MG/DL (ref 70–99)
HCT VFR BLD CALC: 49.5 % (ref 36–48)
HEMOGLOBIN: 16.6 G/DL (ref 12–16)
LYMPHOCYTES ABSOLUTE: 1.6 K/UL (ref 1–5.1)
LYMPHOCYTES RELATIVE PERCENT: 16.2 %
MCH RBC QN AUTO: 31.5 PG (ref 26–34)
MCHC RBC AUTO-ENTMCNC: 33.5 G/DL (ref 31–36)
MCV RBC AUTO: 94.3 FL (ref 80–100)
MONOCYTES ABSOLUTE: 0.8 K/UL (ref 0–1.3)
MONOCYTES RELATIVE PERCENT: 8.4 %
NEUTROPHILS ABSOLUTE: 7.3 K/UL (ref 1.7–7.7)
NEUTROPHILS RELATIVE PERCENT: 74.3 %
PDW BLD-RTO: 13 % (ref 12.4–15.4)
PLATELET # BLD: 209 K/UL (ref 135–450)
PMV BLD AUTO: 9.9 FL (ref 5–10.5)
POTASSIUM SERPL-SCNC: 3.9 MMOL/L (ref 3.5–5.1)
RBC # BLD: 5.25 M/UL (ref 4–5.2)
SODIUM BLD-SCNC: 138 MMOL/L (ref 136–145)
TOTAL PROTEIN: 7.2 G/DL (ref 6.4–8.2)
WBC # BLD: 9.8 K/UL (ref 4–11)

## 2019-07-24 PROCEDURE — 99214 OFFICE O/P EST MOD 30 MIN: CPT | Performed by: FAMILY MEDICINE

## 2019-07-24 RX ORDER — METHYLPREDNISOLONE 4 MG/1
TABLET ORAL
Qty: 1 KIT | Refills: 0 | Status: SHIPPED | OUTPATIENT
Start: 2019-07-24 | End: 2019-09-25

## 2019-07-24 NOTE — PROGRESS NOTES
LABA1C 5.7 03/18/2019     Lab Results   Component Value Date    .9 03/18/2019     TSH   Date Value Ref Range Status   03/06/2019 2.46 0.27 - 4.20 uIU/mL Final   11/23/2018 2.32 0.27 - 4.20 uIU/mL Final      Lab Results   Component Value Date    WBC 8.2 03/06/2019    HGB 14.8 03/06/2019    HCT 43.6 03/06/2019    MCV 92.6 03/06/2019     03/06/2019      Objective:   Physical Exam  Vitals:    07/24/19 1255 07/24/19 1308   BP: (!) 144/82 (!) 140/80   Pulse: 92    Resp: 16    Temp: 97.8 °F (36.6 °C)    TempSrc: Oral    SpO2: 97%    Weight: 261 lb 8 oz (118.6 kg)      Wt Readings from Last 3 Encounters:   07/24/19 261 lb 8 oz (118.6 kg)   03/07/19 264 lb (119.7 kg)   03/06/19 259 lb 3.2 oz (117.6 kg)        Physical Exam  NAD  Skin is warm and dry. Well hydrated, no rash. The neck is supple and free of adenopathy or masses, the thyroid is normal without enlargement or nodules. Chest is clear, no wheezing or rales. Normal symmetric air entry throughout both lung fields. Heart regular with normal rate, no murmer or gallop  The abdomen is soft without tenderness, guarding, mass, rebound or organomegaly. Bowel sounds are normal. No CVA tenderness or inguinal adenopathy noted. Aorta, femoral, DP and PT pulses intact. Lumbosacral spine area reveals mild-moderate tenderness bilateral and mild spasm. Painful and reduced LS ROM noted. Straight leg raise is negative at 60 degrees on bilateral.   Motor 5/5 hip flexors, flexion and extension knees, dorsiflexion and plantar flexion feet. DTR 2/4 patella and Achilles tendons. Hips and knees have full range of motion without pain. Assessment:       Diagnosis Orders   1. Change in bowel function  CBC Auto Differential    Comprehensive Metabolic Panel    Celiac Reflex Panel    COMMON FOOD ALLERGEN PROFILE  Rule out celiac, food allergy, IBS-D, microscopic colitis.   Refer for colonoscopy if labs normal.    2. Breast cancer screening  avoidable on care

## 2019-07-26 LAB
ALLERGEN CLAMS IGE: <0.1 KU/L
ALLERGEN CODFISH IGE: <0.1 KU/L
ALLERGEN CORN IGE: <0.1 KU/L
ALLERGEN COW MILK IGE: <0.1 KU/L
ALLERGEN EGG WHITE IGE: <0.1 KU/L
ALLERGEN PEANUT (F13) IGE: <0.1 KU/L
ALLERGEN SCALLOP IGE: <0.1 KU/L
ALLERGEN SEE NOTE: NORMAL
ALLERGEN SHRIMP IGE: <0.1 KU/L
ALLERGEN SOYBEAN IGE: <0.1 KU/L
ALLERGEN WALNUT IGE: <0.1 KU/L
ALLERGEN WHEAT IGE: <0.1 KU/L
IGA: 138 MG/DL (ref 68–408)
IGE: 23 KU/L
TISSUE TRANSGLUTAMINASE IGA: 1 U/ML (ref 0–3)

## 2019-07-29 ENCOUNTER — HOSPITAL ENCOUNTER (OUTPATIENT)
Dept: PHYSICAL THERAPY | Age: 49
Setting detail: THERAPIES SERIES
Discharge: HOME | End: 2019-07-29
Payer: COMMERCIAL

## 2019-07-29 PROCEDURE — 97161 PT EVAL LOW COMPLEX 20 MIN: CPT | Performed by: PHYSICAL THERAPIST

## 2019-07-29 PROCEDURE — G0283 ELEC STIM OTHER THAN WOUND: HCPCS | Performed by: PHYSICAL THERAPIST

## 2019-07-29 PROCEDURE — 97530 THERAPEUTIC ACTIVITIES: CPT | Performed by: PHYSICAL THERAPIST

## 2019-07-29 PROCEDURE — 97110 THERAPEUTIC EXERCISES: CPT | Performed by: PHYSICAL THERAPIST

## 2019-07-29 NOTE — PLAN OF CARE
addressed at this time. Co-morbidities/Complexities (which will affect course of rehabilitation):   ? None           Arthritic conditions   ? Rheumatoid arthritis (M05.9)  X Osteoarthritis (M19.91)   Cardiovascular conditions   X Hypertension (I10)  ? Hyperlipidemia (E78.5)  ? Angina pectoris (I20)  ? Atherosclerosis (I70)   Musculoskeletal conditions   ? Disc pathology   ? Congenital spine pathologies   ? Prior surgical intervention  ? Osteoporosis (M81.8)  ? Osteopenia (M85.8)   Endocrine conditions   ? Hypothyroid (E03.9)  ? Hyperthyroid Gastrointestinal conditions   ? Constipation (J10.89)   Metabolic conditions   ? Morbid obesity (E66.01)  ? Diabetes type 1(E10.65) or 2 (E11.65)   ? Neuropathy (G60.9)     Pulmonary conditions   ? Asthma (J45)  ? Coughing   ? COPD (J44.9)   Psychological Disorders  ? Anxiety (F41.9)  ? Depression (F32.9)   ? Other:   X Other: left LE lymphedema           Barriers to/and or personal factors that will affect rehab potential:              ?Age  ? Sex              ? Motivation/Lack of Motivation                        ? Co-Morbidities              ? Cognitive Function, education/learning barriers              ? Environmental, home barriers              ? profession/work barriers  ? past PT/medical experience  ? other:  Justification:     Falls Risk Assessment (30 days):   ? Falls Risk assessed and no intervention required. ? Falls Risk assessed and Patient requires intervention due to being higher risk   TUG score (>12s at risk):     ? Falls education provided, including         ASSESSMENT:   Functional Impairments:     X Noted lumbar/proximal hip hypomobility   ? Noted lumbosacral and/or generalized hypermobility   X Decreased Lumbosacral/hip/LE functional ROM   X Decreased core/proximal hip strength and neuromuscular control       Decreased LE functional strength    ? Abnormal reflexes/sensation/myotomal/dermatomal deficits  ? Reduced balance/proprioceptive control    ?other:      Functional Activity

## 2019-07-29 NOTE — FLOWSHEET NOTE
The [address] [facility] and [address] [facility], [address] [place] [address] Drive [address] [facility], [address] [facility]  Phone: [phone]   Fax:     [phone]    Physical Therapy Daily Treatment Note  Date:  2019    Patient Name:  Curly Costa    :  1970  MRN: [id number]  Restrictions/Precautions:    Medical/Treatment Diagnosis Information:  · Diagnosis: S39.012A (ICD-10-CM) - Strain of lumbar region, initial encounter  · Treatment Diagnosis: M54.56 Low back pain, lumbar region  Insurance/Certification information:  PT Insurance Information: [place]/$0 copay/60 vpcy/no auth  Physician Information:  Referring Practitioner: Ulises Brannon of care signed (Y/N):     Date of Patient follow up with Physician:     G-Code (if applicable):      Date G-Code Applied:  19   [place] 47% LOF    Progress Note: []  Yes  []  No  Next due by: Visit #10      Latex Allergy:  []NO      [x]YES  Preferred Language for Healthcare:   [x]English       []other:    Visit # Insurance Allowable   1 60     Pain level:  2/10     SUBJECTIVE:  See eval    OBJECTIVE: See eval  Observation:   Test measurements:      RESTRICTIONS/PRECAUTIONS: none    Exercises/Interventions:   ROM/stretches     SKTC     DKTC     Prayer stretch     Prone lying 5'    NGOC 3'    Prone press ups 3x10    Hook lying rotation     Cat and camel          Strengthening     SLR     Quadruped alternate UE reaches     Quadruped alternate LE reaches     Quadruped alternate UE/LE reaches     Nextt heel raises     Sit ups      planks     Tband lat pulls     Tband rows         Manual Intervention             Prone PA      GISTM/STM      Lumbar Manip      SI Manip      Hip belt mobs      Hip LA distraction              Therapeutic Exercise and NMR EXR  [x] (95700) Provided verbal/tactile cueing for activities related to strengthening, flexibility, endurance, ROM  for improvements in proximal hip and core control with self

## 2019-07-30 ENCOUNTER — HOSPITAL ENCOUNTER (OUTPATIENT)
Dept: NON INVASIVE DIAGNOSTICS | Age: 49
Discharge: HOME | End: 2019-07-30
Payer: COMMERCIAL

## 2019-07-30 DIAGNOSIS — R00.0 TACHYCARDIA: ICD-10-CM

## 2019-07-30 LAB
LV EF: 58 %
LVEF MODALITY: NORMAL

## 2019-07-30 PROCEDURE — 93306 TTE W/DOPPLER COMPLETE: CPT

## 2019-07-31 ENCOUNTER — HOSPITAL ENCOUNTER (OUTPATIENT)
Dept: PHYSICAL THERAPY | Age: 49
Setting detail: THERAPIES SERIES
Discharge: HOME | End: 2019-07-31
Payer: COMMERCIAL

## 2019-07-31 PROBLEM — I11.9 HYPERTENSIVE LEFT VENTRICULAR HYPERTROPHY, WITHOUT HEART FAILURE: Status: ACTIVE | Noted: 2019-07-31

## 2019-07-31 PROCEDURE — 97110 THERAPEUTIC EXERCISES: CPT | Performed by: PHYSICAL THERAPIST

## 2019-07-31 PROCEDURE — 97140 MANUAL THERAPY 1/> REGIONS: CPT | Performed by: PHYSICAL THERAPIST

## 2019-07-31 PROCEDURE — G0283 ELEC STIM OTHER THAN WOUND: HCPCS | Performed by: PHYSICAL THERAPIST

## 2019-07-31 NOTE — FLOWSHEET NOTE
complications  [x] Other: 7/31 No complaints with today's program.  Tolerated program much better than last visit. She demonstrated less movement with left sided rotation vs right rotation during LTROT exercise. Noted mild discomfort with left rotation. Patient education: HEP provided/reviewed, activity modification    Prognosis: [x] Good [] Fair  [] Poor    Patient Requires Follow-up: [x] Yes  [] No    PLAN:   [x] Continue per plan of care [] Alter current plan (see comments)  [] Plan of care initiated [] Hold pending MD visit [] Discharge     Progress per pt tolerance    Electronically signed by: Leah Doherty PT    *If patient does not return for further follow ups after this date. Please consider this as the patients discharge from physical therapy.

## 2019-08-05 ENCOUNTER — HOSPITAL ENCOUNTER (OUTPATIENT)
Dept: PHYSICAL THERAPY | Age: 49
Setting detail: THERAPIES SERIES
Discharge: HOME | End: 2019-08-05
Payer: COMMERCIAL

## 2019-08-05 ENCOUNTER — PATIENT MESSAGE (OUTPATIENT)
Dept: FAMILY MEDICINE CLINIC | Age: 49
End: 2019-08-05

## 2019-08-05 DIAGNOSIS — I10 ESSENTIAL HYPERTENSION: Primary | ICD-10-CM

## 2019-08-05 PROCEDURE — 97110 THERAPEUTIC EXERCISES: CPT | Performed by: PHYSICAL THERAPIST

## 2019-08-05 PROCEDURE — 97140 MANUAL THERAPY 1/> REGIONS: CPT | Performed by: PHYSICAL THERAPIST

## 2019-08-05 PROCEDURE — G0283 ELEC STIM OTHER THAN WOUND: HCPCS | Performed by: PHYSICAL THERAPIST

## 2019-08-05 NOTE — FLOWSHEET NOTE
[facility], [address] [employer] [address] [place] [address], [address] [place] [address]  Phone: [phone]   Fax:     [phone]    Physical Therapy Daily Treatment Note  Date:  2019    Patient Name:  Lester Kaufman    :  1970  MRN: [id number]  Restrictions/Precautions:    Medical/Treatment Diagnosis Information:  · Diagnosis: S39.012A (ICD-10-CM) - Strain of lumbar region, initial encounter  · Treatment Diagnosis: M54.56 Low back pain, lumbar region  Insurance/Certification information:  PT Insurance Information: [place]/$0 copay/60 vpcy/no auth  Physician Information:  Referring Practitioner: Jessica Yousif of care signed (Y/N):     Date of Patient follow up with Physician:     CARLEEN-Code (if applicable):      Date G-Code Applied:  19   [place] 47% LO    Progress Note: []  Yes  []  No  Next due by: Visit #10      Latex Allergy:  []NO      [x]YES  Preferred Language for Healthcare:   [x]English       []other:    Visit # Insurance Allowable   3 60     Pain level:  2/10     SUBJECTIVE:   Reports that her back is doing okay. States that she had a difficult time lying prone last night. States that she felt better propped on her elbows. Notes that this morning she feels the best that she has in quite some time.     OBJECTIVE:   Observation:   Test measurements:      RESTRICTIONS/PRECAUTIONS: none    Exercises/Interventions:   ROM/stretches     [address]     DKTC     Prayer stretch     Prone lying 5'    Prone bilateral knee flexion 3x10 Added    NGOC 3'    Prone press ups 3x10    Hook lying rotation 20x5\" Added    Cat and camel          Strengthening     SLR     Quadruped alternate UE reaches     Quadruped alternate LE reaches     Quadruped alternate UE/LE reaches     Prone alt LE lifts 20x Added    Prone alt UE/LE 20x Added    bridging 3x10 Added /   Ball heel raises     Sit ups      planks     Tband lat pulls     Tband rows Manual Intervention             Prone PA 10'  Added 7/31   GISTM/STM      Lumbar Manip      SI Manip      Hip belt mobs      Hip LA distraction              Therapeutic Exercise and NMR EXR  [x] (98889) Provided verbal/tactile cueing for activities related to strengthening, flexibility, endurance, ROM  for improvements in proximal hip and core control with self care, mobility, lifting and ambulation.  [] (18791) Provided verbal/tactile cueing for activities related to improving balance, coordination, kinesthetic sense, posture, motor skill, proprioception  to assist with core control in self care, mobility, lifting, and ambulation.      Therapeutic Activities:    [] (42457 or 47307) Provided verbal/tactile cueing for activities related to improving balance, coordination, kinesthetic sense, posture, motor skill, proprioception and motor activation to allow for proper function  with self care and ADLs  [] (27211) Provided training and instruction to the patient for proper core and proximal hip recruitment and positioning with ambulation re-education     Home Exercise Program:    [x] (43647) Reviewed/Progressed HEP activities related to strengthening, flexibility, endurance, ROM of core, proximal hip and LE for functional self-care, mobility, lifting and ambulation   [] (62393) Reviewed/Progressed HEP activities related to improving balance, coordination, kinesthetic sense, posture, motor skill, proprioception of core, proximal hip and LE for self care, mobility, lifting, and ambulation      Manual Treatments:  PROM / STM / Oscillations-Mobs:  G-I, II, III, IV (PA's, Inf., Post.)  [] (26118) Provided manual therapy to mobilize proximal hip and LS spine soft tissue/joints for the purpose of modulating pain, promoting relaxation,  increasing ROM, reducing/eliminating soft tissue swelling/inflammation/restriction, improving soft tissue extensibility and allowing for proper ROM for normal function with self care,

## 2019-08-07 ENCOUNTER — HOSPITAL ENCOUNTER (OUTPATIENT)
Dept: PHYSICAL THERAPY | Age: 49
Setting detail: THERAPIES SERIES
Discharge: HOME | End: 2019-08-07
Payer: COMMERCIAL

## 2019-08-07 PROCEDURE — 97110 THERAPEUTIC EXERCISES: CPT | Performed by: PHYSICAL THERAPIST

## 2019-08-07 PROCEDURE — 97140 MANUAL THERAPY 1/> REGIONS: CPT | Performed by: PHYSICAL THERAPIST

## 2019-08-07 PROCEDURE — 97112 NEUROMUSCULAR REEDUCATION: CPT | Performed by: PHYSICAL THERAPIST

## 2019-08-07 PROCEDURE — G0283 ELEC STIM OTHER THAN WOUND: HCPCS | Performed by: PHYSICAL THERAPIST

## 2019-08-12 ENCOUNTER — HOSPITAL ENCOUNTER (OUTPATIENT)
Dept: PHYSICAL THERAPY | Age: 49
Setting detail: THERAPIES SERIES
Discharge: HOME | End: 2019-08-12
Payer: COMMERCIAL

## 2019-08-12 PROCEDURE — 97112 NEUROMUSCULAR REEDUCATION: CPT | Performed by: PHYSICAL THERAPIST

## 2019-08-12 PROCEDURE — G0283 ELEC STIM OTHER THAN WOUND: HCPCS | Performed by: PHYSICAL THERAPIST

## 2019-08-12 PROCEDURE — 97110 THERAPEUTIC EXERCISES: CPT | Performed by: PHYSICAL THERAPIST

## 2019-08-12 PROCEDURE — 97140 MANUAL THERAPY 1/> REGIONS: CPT | Performed by: PHYSICAL THERAPIST

## 2019-08-12 NOTE — FLOWSHEET NOTE
[address] [facility], [address] [place] [address], [address] [place] [address]  Phone: [phone]   Fax:     [phone]    Physical Therapy Daily Treatment Note  Date:  2019    Patient Name:  Curly Costa    :  1970  MRN: [id number]  Restrictions/Precautions:    Medical/Treatment Diagnosis Information:  · Diagnosis: S39.012A (ICD-10-CM) - Strain of lumbar region, initial encounter  · Treatment Diagnosis: M54.56 Low back pain, lumbar region  Insurance/Certification information:  PT Insurance Information: [place]/$0 copay/60 vpcy/no auth  Physician Information:  Referring Practitioner: Garrett Adamson  Plan of care signed (Y/N):     Date of Patient follow up with Physician:     G-Code (if applicable):      Date G-Code Applied:  19   [place] 47% LOF    Progress Note: []  Yes  []  No  Next due by: Visit #10      Latex Allergy:  []NO      [x]YES  Preferred Language for Healthcare:   [x]English       []other:    Visit # Insurance Allowable   5 60 orthonet-good through Oct 1     Pain level:  2/10     SUBJECTIVE:   Reports that her back is feeling better. States that she still gets some discomfort with sitting for extended periods of time. States that she did start using a lumbar roll when seated and this does seem to help.        OBJECTIVE:   Observation:   Test measurements:      RESTRICTIONS/PRECAUTIONS: none    Exercises/Interventions:   ROM/stretches     [facility]     DKTC     Prayer stretch     Prone lying 5'    Prone bilateral knee flexion 3x10 Added    NGOC 3'    Prone press ups 3x10    Hook lying rotation 20x5\" Added    Cat and camel          Strengthening     SLR     Quadruped alternate UE reaches     Quadruped alternate LE reaches     Quadruped alternate UE/LE reaches     Prone alt LE lifts 20x Added    Prone alt UE/LE 20x Added    bridging 3x10 Added    Supine alt LE  30x Added    Slouch correct 20x Added  progression  [] Other:     ASSESSMENT:      Treatment/Activity Tolerance:  [x] Patient tolerated treatment well [] Patient limited by fatique  [] Patient limited by pain  [] Patient limited by other medical complications  [x] Other: 8/12 Minimal complaints with today's program.  Pt did note some lumbar discomfort with LTROT today and the exercise was held after several reps were completed. Patient education: HEP provided/reviewed, activity modification    Prognosis: [x] Good [] Fair  [] Poor    Patient Requires Follow-up: [x] Yes  [] No    PLAN:   [x] Continue per plan of care [] Alter current plan (see comments)  [] Plan of care initiated [] Hold pending MD visit [] Discharge     Progress per pt tolerance    Electronically signed by: Farrah Guallpa PT    *If patient does not return for further follow ups after this date. Please consider this as the patients discharge from physical therapy.

## 2019-08-14 ENCOUNTER — HOSPITAL ENCOUNTER (OUTPATIENT)
Dept: PHYSICAL THERAPY | Age: 49
Setting detail: THERAPIES SERIES
Discharge: HOME | End: 2019-08-14
Payer: COMMERCIAL

## 2019-08-14 PROCEDURE — 97110 THERAPEUTIC EXERCISES: CPT | Performed by: PHYSICAL THERAPIST

## 2019-08-14 PROCEDURE — 97112 NEUROMUSCULAR REEDUCATION: CPT | Performed by: PHYSICAL THERAPIST

## 2019-08-14 PROCEDURE — G0283 ELEC STIM OTHER THAN WOUND: HCPCS | Performed by: PHYSICAL THERAPIST

## 2019-08-14 PROCEDURE — 97140 MANUAL THERAPY 1/> REGIONS: CPT | Performed by: PHYSICAL THERAPIST

## 2019-08-14 NOTE — FLOWSHEET NOTE
swelling/inflammation/restriction, improving soft tissue extensibility and allowing for proper ROM for normal function with self care, mobility, lifting and ambulation. Modalities:  ESU (premod) 10' with ice    Charges:  Timed Code Treatment Minutes: 40'   Total Treatment Minutes: 7:31-8:30  61'       [] EVAL (LOW) 71141 (typically 20 minutes face-to-face)  [] EVAL (MOD) 42162 (typically 30 minutes face-to-face)  [] EVAL (HIGH) 55110 (typically 45 minutes face-to-face)  [] RE-EVAL     [x] HD(27828) x  1   [] IONTO  [x] NMR (42961) x  1   [] VASO  [x] Manual (33229) x  1    [] Other:  [] TA x       [] Mech Traction (91396)  [] ES(attended) (36649)      [x] ES (un) (69862):     Goals: Short Term Goals: To be achieved in: 2 weeks  1. Independent in HEP and progression per patient tolerance, in order to prevent re-injury. 2. Patient will have a decrease in pain to facilitate improvement in movement, function, and ADLs as indicated by Functional Deficits.     Long Term Goals: To be achieved in: 6 weeks  1. Disability index score of 15% or less for the [place] to assist with reaching prior level of function. 2. Patient will demonstrate increased AROM to WNL, good LS mobility, good hip ROM to allow for proper joint functioning as indicated by patients Functional Deficits. 3. Patient will demonstrate an increase in Strength to good proximal hip and core activation to allow for proper functional mobility as indicated by patients Functional Deficits. 4. Patient will return to all functional activities without increased symptoms or restriction. 5. Patient will be able to lift 30lbs floor to waist independently without pain. Progression Towards Functional goals:  [] Patient is progressing as expected towards functional goals listed. [] Progression is slowed due to complexities listed. [] Progression has been slowed due to co-morbidities.   [x] Plan just implemented, too soon to assess goals

## 2019-08-19 ENCOUNTER — HOSPITAL ENCOUNTER (OUTPATIENT)
Dept: PHYSICAL THERAPY | Age: 49
Setting detail: THERAPIES SERIES
Discharge: HOME | End: 2019-08-19
Payer: COMMERCIAL

## 2019-08-19 PROCEDURE — 97110 THERAPEUTIC EXERCISES: CPT | Performed by: PHYSICAL THERAPIST

## 2019-08-19 PROCEDURE — 97112 NEUROMUSCULAR REEDUCATION: CPT | Performed by: PHYSICAL THERAPIST

## 2019-08-19 PROCEDURE — G0283 ELEC STIM OTHER THAN WOUND: HCPCS | Performed by: PHYSICAL THERAPIST

## 2019-08-19 NOTE — FLOWSHEET NOTE
[address] [facility], [facility] [address] [place] [address], [address] [place] [address]  Phone: [phone]   Fax:     [phone]    Physical Therapy Daily Treatment Note  Date:  2019    Patient Name:  Kanika Obregon    :  1970  MRN: [id number]  Restrictions/Precautions:    Medical/Treatment Diagnosis Information:  · Diagnosis: S39.012A (ICD-10-CM) - Strain of lumbar region, initial encounter  · Treatment Diagnosis: M54.56 Low back pain, lumbar region  Insurance/Certification information:  PT Insurance Information: [place]/$0 copay/60 vpcy/no auth  Physician Information:  Referring Practitioner: Lan José  Plan of care signed (Y/N):     Date of Patient follow up with Physician:     G-Code (if applicable):      Date G-Code Applied:  19   [place] 47% LOF    Progress Note: []  Yes  []  No  Next due by: Visit #10      Latex Allergy:  []NO      [x]YES  Preferred Language for Healthcare:   [x]English       []other:    Visit # Insurance Allowable    60 orthonet-good through Oct 1     Pain level:  2/10     SUBJECTIVE:   Reports that it is difficult to tell if her back is getting better. States that she has good days and bad days. Notes that she went to [address] game this weekend and this made her symptoms worse.     OBJECTIVE:   Observation:   Test measurements:      RESTRICTIONS/PRECAUTIONS: none    Exercises/Interventions:   ROM/stretches     [facility]     DKTC     Prayer stretch     Prone lying 5'    Prone bilateral knee flexion 3x10 Added    NGOC 3'    Prone press ups 3x10    Hook lying rotation 20x5\" Added    Cat and camel          Strengthening     SLR     Quadruped alternate UE reaches     Quadruped alternate LE reaches     Quadruped alternate UE/LE reaches     Prone alt LE lifts 30x Increased    Prone alt UE/LE 30x Increased    bridging 3x10 Added    Supine alt LE  30x Added    Supine alt UE/LE 30x Added    Slouch correct tissue swelling/inflammation/restriction, improving soft tissue extensibility and allowing for proper ROM for normal function with self care, mobility, lifting and ambulation. Modalities:  ESU (premod) 10' with ice    Charges:  Timed Code Treatment Minutes: 44'   Total Treatment Minutes: 7:26-8:25  61'       [] EVAL (LOW) 52281 (typically 20 minutes face-to-face)  [] EVAL (MOD) 58910 (typically 30 minutes face-to-face)  [] EVAL (HIGH) 04231 (typically 45 minutes face-to-face)  [] RE-EVAL     [x] BV(64467) x  2   [] IONTO  [x] NMR (95142) x  1   [] VASO  [] Manual (00097) x       [] Other:  [] TA x       [] Mech Traction (57587)  [] ES(attended) (43378)      [x] ES (un) (16640):     Goals: Short Term Goals: To be achieved in: 2 weeks  1. Independent in HEP and progression per patient tolerance, in order to prevent re-injury. 2. Patient will have a decrease in pain to facilitate improvement in movement, function, and ADLs as indicated by Functional Deficits.     Long Term Goals: To be achieved in: 6 weeks  1. Disability index score of 15% or less for the [place] to assist with reaching prior level of function. 2. Patient will demonstrate increased AROM to WNL, good LS mobility, good hip ROM to allow for proper joint functioning as indicated by patients Functional Deficits. 3. Patient will demonstrate an increase in Strength to good proximal hip and core activation to allow for proper functional mobility as indicated by patients Functional Deficits. 4. Patient will return to all functional activities without increased symptoms or restriction. 5. Patient will be able to lift 30lbs floor to waist independently without pain. Progression Towards Functional goals:  [] Patient is progressing as expected towards functional goals listed. [] Progression is slowed due to complexities listed. [] Progression has been slowed due to co-morbidities.   [x] Plan just implemented, too soon to assess goals

## 2019-08-21 ENCOUNTER — APPOINTMENT (OUTPATIENT)
Dept: PHYSICAL THERAPY | Age: 49
End: 2019-08-21
Payer: COMMERCIAL

## 2019-08-22 ENCOUNTER — HOSPITAL ENCOUNTER (OUTPATIENT)
Dept: PHYSICAL THERAPY | Age: 49
Setting detail: THERAPIES SERIES
Discharge: HOME | End: 2019-08-22
Payer: COMMERCIAL

## 2019-08-22 PROCEDURE — G0283 ELEC STIM OTHER THAN WOUND: HCPCS | Performed by: PHYSICAL THERAPIST

## 2019-08-22 PROCEDURE — 97110 THERAPEUTIC EXERCISES: CPT | Performed by: PHYSICAL THERAPIST

## 2019-08-22 PROCEDURE — 97112 NEUROMUSCULAR REEDUCATION: CPT | Performed by: PHYSICAL THERAPIST

## 2019-08-22 NOTE — FLOWSHEET NOTE
[address] [facility], [facility] [place] 2[facility] [address], [address] [place] [address]  Phone: [phone]   Fax:     [phone]    Physical Therapy Daily Treatment Note  Date:  2019    Patient Name:  Zari Camacho    :  1970  MRN: [id number]  Restrictions/Precautions:    Medical/Treatment Diagnosis Information:  · Diagnosis: S39.012A (ICD-10-CM) - Strain of lumbar region, initial encounter  · Treatment Diagnosis: M54.56 Low back pain, lumbar region  Insurance/Certification information:  PT Insurance Information: [place]/$0 copay/60 vpcy/no auth  Physician Information:  Referring Practitioner: Marsha Munguia  Plan of care signed (Y/N):     Date of Patient follow up with Physician:     G-Code (if applicable):      Date G-Code Applied:  19   [place] 47% LOF    Progress Note: []  Yes  []  No  Next due by: Visit #10      Latex Allergy:  []NO      [x]YES  Preferred Language for Healthcare:   [x]English       []other:    Visit # Insurance Allowable   8 60 orthonet-good through Oct 1     Pain level:  210     SUBJECTIVE:   Reports that she returned to work today and her back did really well. States that she has a chair at work now that provided good lumbar support.   OBJECTIVE:   Observation:   Test measurements:      RESTRICTIONS/PRECAUTIONS: none    Exercises/Interventions:   ROM/stretches     [facility]     DKTC     Prayer stretch     Prone lying 5'    Prone bilateral knee flexion 3x10 Added    NGOC 3'    Prone press ups 3x10    Hook lying rotation 20x5\" Added    Cat and camel          Strengthening     SLR     Quadruped alternate UE reaches     Quadruped alternate LE reaches     Quadruped alternate UE/LE reaches     Prone alt LE lifts 30x Increased    Prone alt UE/LE 30x Increased    bridging 3x10 Added    Supine alt LE  30x Added    Supine alt UE/LE 30x Added    SLR supine 3x10 each Added    Slouch correct 30x Increased

## 2019-08-27 ENCOUNTER — HOSPITAL ENCOUNTER (OUTPATIENT)
Dept: PHYSICAL THERAPY | Age: 49
Setting detail: THERAPIES SERIES
Discharge: HOME | End: 2019-08-27
Payer: COMMERCIAL

## 2019-08-27 NOTE — FLOWSHEET NOTE
Physical Therapy  Cancellation/No-show Note  Patient Name:  Tanisha Cristobal  :  1970   Date:  2019  Cancelled visits to date: 0  No-shows to date: 0    For today's appointment patient:  [x]  Cancelled  []  Rescheduled appointment  []  No-show     Reason given by patient:  []  Patient ill  []  Conflicting appointment  []  No transportation    []  Conflict with work  [x]  No reason given  []  Other:     Comments:      Electronically signed by:  Cindy Clements PT

## 2019-09-01 DIAGNOSIS — I10 ESSENTIAL HYPERTENSION: ICD-10-CM

## 2019-09-01 DIAGNOSIS — K21.9 GASTROESOPHAGEAL REFLUX DISEASE, ESOPHAGITIS PRESENCE NOT SPECIFIED: ICD-10-CM

## 2019-09-03 RX ORDER — LOSARTAN POTASSIUM AND HYDROCHLOROTHIAZIDE 25; 100 MG/1; MG/1
TABLET ORAL
Qty: 30 TABLET | Refills: 2 | OUTPATIENT
Start: 2019-09-03

## 2019-09-03 RX ORDER — OMEPRAZOLE 40 MG/1
40 CAPSULE, DELAYED RELEASE ORAL DAILY
Qty: 90 CAPSULE | Refills: 2 | Status: SHIPPED | OUTPATIENT
Start: 2019-09-03 | End: 2020-08-14

## 2019-09-03 RX ORDER — LOSARTAN POTASSIUM AND HYDROCHLOROTHIAZIDE 25; 100 MG/1; MG/1
1 TABLET ORAL DAILY
Qty: 90 TABLET | Refills: 2 | Status: SHIPPED | OUTPATIENT
Start: 2019-09-03 | End: 2019-09-25 | Stop reason: ALTCHOICE

## 2019-09-09 ENCOUNTER — HOSPITAL ENCOUNTER (OUTPATIENT)
Dept: PHYSICAL THERAPY | Age: 49
Setting detail: THERAPIES SERIES
Discharge: HOME | End: 2019-09-09
Payer: COMMERCIAL

## 2019-09-09 PROCEDURE — 97112 NEUROMUSCULAR REEDUCATION: CPT | Performed by: PHYSICAL THERAPIST

## 2019-09-09 PROCEDURE — 97110 THERAPEUTIC EXERCISES: CPT | Performed by: PHYSICAL THERAPIST

## 2019-09-09 PROCEDURE — G0283 ELEC STIM OTHER THAN WOUND: HCPCS | Performed by: PHYSICAL THERAPIST

## 2019-09-09 NOTE — PLAN OF CARE
Strengthening     SLR     Quadruped alternate UE reaches     Quadruped alternate LE reaches     Quadruped alternate UE/LE reaches     Prone alt LE lifts 30x Increased 8/14   Prone alt UE/LE 30x Increased 8/14   Prone bilat LE lifts 20x Increased 9/9   bridging 3x10 Added 8/5   Supine alt LE  30x Added 8/7   Supine alt UE/LE 30x Added 8/14   SLR supine 3x10 each Added 8/22   Slouch correct 30x Increased 8/14   Standing lumbar extensions 3x10 Added 8/12   Sit ups      planks     Tband lat pulls     Tband rows         Manual Intervention             Prone PA 10'  Added 7/31   GISTM/STM      Lumbar Manip stretch 5'  Added 8/12   SI Manip      Hip belt mobs      Hip LA distraction              Therapeutic Exercise and NMR EXR  [x] (46987) Provided verbal/tactile cueing for activities related to strengthening, flexibility, endurance, ROM  for improvements in proximal hip and core control with self care, mobility, lifting and ambulation.  [] (28740) Provided verbal/tactile cueing for activities related to improving balance, coordination, kinesthetic sense, posture, motor skill, proprioception  to assist with core control in self care, mobility, lifting, and ambulation.      Therapeutic Activities:    [] (81571 or 42795) Provided verbal/tactile cueing for activities related to improving balance, coordination, kinesthetic sense, posture, motor skill, proprioception and motor activation to allow for proper function  with self care and ADLs  [] (02091) Provided training and instruction to the patient for proper core and proximal hip recruitment and positioning with ambulation re-education     Home Exercise Program:    [x] (65359) Reviewed/Progressed HEP activities related to strengthening, flexibility, endurance, ROM of core, proximal hip and LE for functional self-care, mobility, lifting and ambulation   [] (58376) Reviewed/Progressed HEP activities related to improving balance, coordination, kinesthetic sense, posture,

## 2019-09-20 DIAGNOSIS — I10 ESSENTIAL HYPERTENSION: Primary | ICD-10-CM

## 2019-09-20 RX ORDER — AMLODIPINE BESYLATE 5 MG/1
5 TABLET ORAL DAILY
Qty: 30 TABLET | Refills: 5 | Status: SHIPPED | OUTPATIENT
Start: 2019-09-20 | End: 2020-08-17

## 2019-09-25 ENCOUNTER — OFFICE VISIT (OUTPATIENT)
Dept: FAMILY MEDICINE CLINIC | Age: 49
End: 2019-09-25
Payer: COMMERCIAL

## 2019-09-25 VITALS
DIASTOLIC BLOOD PRESSURE: 86 MMHG | BODY MASS INDEX: 37.66 KG/M2 | OXYGEN SATURATION: 97 % | TEMPERATURE: 98.4 F | WEIGHT: 270 LBS | SYSTOLIC BLOOD PRESSURE: 144 MMHG | HEART RATE: 82 BPM | RESPIRATION RATE: 12 BRPM

## 2019-09-25 DIAGNOSIS — Z23 NEED FOR INFLUENZA VACCINATION: Primary | ICD-10-CM

## 2019-09-25 DIAGNOSIS — R42 DIZZINESS: ICD-10-CM

## 2019-09-25 DIAGNOSIS — E78.00 PURE HYPERCHOLESTEROLEMIA: ICD-10-CM

## 2019-09-25 DIAGNOSIS — I10 ESSENTIAL HYPERTENSION: ICD-10-CM

## 2019-09-25 PROCEDURE — 90471 IMMUNIZATION ADMIN: CPT | Performed by: FAMILY MEDICINE

## 2019-09-25 PROCEDURE — 90686 IIV4 VACC NO PRSV 0.5 ML IM: CPT | Performed by: FAMILY MEDICINE

## 2019-09-25 PROCEDURE — 99214 OFFICE O/P EST MOD 30 MIN: CPT | Performed by: FAMILY MEDICINE

## 2019-09-25 RX ORDER — VALSARTAN AND HYDROCHLOROTHIAZIDE 160; 25 MG/1; MG/1
1 TABLET ORAL DAILY
Qty: 30 TABLET | Refills: 1 | Status: SHIPPED | OUTPATIENT
Start: 2019-09-25 | End: 2019-11-20 | Stop reason: SDUPTHER

## 2019-09-25 ASSESSMENT — PATIENT HEALTH QUESTIONNAIRE - PHQ9
SUM OF ALL RESPONSES TO PHQ QUESTIONS 1-9: 0
SUM OF ALL RESPONSES TO PHQ9 QUESTIONS 1 & 2: 0
2. FEELING DOWN, DEPRESSED OR HOPELESS: 0
1. LITTLE INTEREST OR PLEASURE IN DOING THINGS: 0
SUM OF ALL RESPONSES TO PHQ QUESTIONS 1-9: 0

## 2019-09-25 NOTE — PROGRESS NOTES
Subjective:      Patient ID: Nena Dodd 50 y.o. female. The primary encounter diagnosis was Essential hypertension. Diagnoses of Pure hypercholesterolemia and Dizziness were also pertinent to this visit. HPI    Started Norvasc on Sunday. Had mild flushing and felt hot. No pedal edema. Yesterday after taking it she noted feeling light headed an nauseated. Did not have a spinning sensation. Checked /105. Came down to 147/ after a few hours. She has intermittent chest tight - related to stress. Not exertional. Is under stress with daughter having issues. She feels worried and anxious, not depressed. Just started Yoga. Some trouble sleeping; falls asleep easily but has trouble maintaining sleep. Hypertension: Patient here for follow-up of elevated blood pressure. She is exercising and is adherent to low salt diet. Blood pressure is not well controlled at home. Cardiac symptoms chest pain. Patient denies exertional chest pressure/discomfort, near-syncope and palpitations. Cardiovascular risk factors: dyslipidemia and hypertension. Use of agents associated with hypertension: none. History of target organ damage: none. Hyperlipidemia:  No new myalgias or GI upset on atorvastatin (Lipitor). Medication compliance: compliant most of the time. Patient is  following a low fat, low cholesterol diet. She is  exercising regularly. Saw Dr. Renato Kenyon; she ordered a colonoscopy.       Lab Results   Component Value Date    CHOL 187 11/23/2018    TRIG 249 (H) 11/23/2018    HDL 43 11/23/2018    LDLCALC 94 11/23/2018     Lab Results   Component Value Date    ALT 40 07/24/2019    AST 26 07/24/2019        Lab Results   Component Value Date     07/24/2019    K 3.9 07/24/2019    CL 99 07/24/2019    CO2 24 07/24/2019    BUN 14 07/24/2019    CREATININE 0.7 07/24/2019    GLUCOSE 104 07/24/2019    CALCIUM 9.4 07/24/2019      TSH   Date Value Ref Range Status   03/06/2019 2.46 0.27 - 4.20 uIU/mL

## 2019-10-03 DIAGNOSIS — I10 ESSENTIAL HYPERTENSION: ICD-10-CM

## 2019-10-03 LAB
ANION GAP SERPL CALCULATED.3IONS-SCNC: 16 MMOL/L (ref 3–16)
BUN BLDV-MCNC: 15 MG/DL (ref 7–20)
CALCIUM SERPL-MCNC: 9.5 MG/DL (ref 8.3–10.6)
CHLORIDE BLD-SCNC: 99 MMOL/L (ref 99–110)
CO2: 24 MMOL/L (ref 21–32)
CREAT SERPL-MCNC: 0.7 MG/DL (ref 0.6–1.1)
GFR AFRICAN AMERICAN: >60
GFR NON-AFRICAN AMERICAN: >60
GLUCOSE BLD-MCNC: 132 MG/DL (ref 70–99)
POTASSIUM SERPL-SCNC: 3.8 MMOL/L (ref 3.5–5.1)
SODIUM BLD-SCNC: 139 MMOL/L (ref 136–145)

## 2019-10-28 ENCOUNTER — CLINICAL DOCUMENTATION (OUTPATIENT)
Dept: PSYCHOLOGY | Age: 49
End: 2019-10-28

## 2019-12-22 ENCOUNTER — PATIENT MESSAGE (OUTPATIENT)
Dept: FAMILY MEDICINE CLINIC | Age: 49
End: 2019-12-22

## 2019-12-22 DIAGNOSIS — R73.03 PRE-DIABETES: ICD-10-CM

## 2019-12-22 DIAGNOSIS — E78.00 PURE HYPERCHOLESTEROLEMIA: Primary | ICD-10-CM

## 2019-12-27 DIAGNOSIS — E78.00 PURE HYPERCHOLESTEROLEMIA: ICD-10-CM

## 2019-12-27 DIAGNOSIS — R73.03 PRE-DIABETES: ICD-10-CM

## 2019-12-27 LAB
A/G RATIO: 1.6 (ref 1.1–2.2)
ALBUMIN SERPL-MCNC: 4.1 G/DL (ref 3.4–5)
ALP BLD-CCNC: 53 U/L (ref 40–129)
ALT SERPL-CCNC: 44 U/L (ref 10–40)
ANION GAP SERPL CALCULATED.3IONS-SCNC: 17 MMOL/L (ref 3–16)
AST SERPL-CCNC: 27 U/L (ref 15–37)
BILIRUB SERPL-MCNC: 0.3 MG/DL (ref 0–1)
BUN BLDV-MCNC: 16 MG/DL (ref 7–20)
CALCIUM SERPL-MCNC: 9.1 MG/DL (ref 8.3–10.6)
CHLORIDE BLD-SCNC: 96 MMOL/L (ref 99–110)
CHOLESTEROL, TOTAL: 150 MG/DL (ref 0–199)
CO2: 23 MMOL/L (ref 21–32)
CREAT SERPL-MCNC: 0.7 MG/DL (ref 0.6–1.1)
GFR AFRICAN AMERICAN: >60
GFR NON-AFRICAN AMERICAN: >60
GLOBULIN: 2.6 G/DL
GLUCOSE BLD-MCNC: 113 MG/DL (ref 70–99)
HDLC SERPL-MCNC: 45 MG/DL (ref 40–60)
LDL CHOLESTEROL CALCULATED: 76 MG/DL
POTASSIUM SERPL-SCNC: 3.6 MMOL/L (ref 3.5–5.1)
SODIUM BLD-SCNC: 136 MMOL/L (ref 136–145)
TOTAL PROTEIN: 6.7 G/DL (ref 6.4–8.2)
TRIGL SERPL-MCNC: 145 MG/DL (ref 0–150)
VLDLC SERPL CALC-MCNC: 29 MG/DL

## 2019-12-28 LAB
ESTIMATED AVERAGE GLUCOSE: 111.2 MG/DL
HBA1C MFR BLD: 5.5 %

## 2019-12-29 DIAGNOSIS — R74.01 ELEVATED ALT MEASUREMENT: Primary | ICD-10-CM

## 2020-02-14 ENCOUNTER — TELEPHONE (OUTPATIENT)
Dept: FAMILY MEDICINE CLINIC | Age: 50
End: 2020-02-14

## 2020-02-14 ENCOUNTER — OFFICE VISIT (OUTPATIENT)
Dept: FAMILY MEDICINE CLINIC | Age: 50
End: 2020-02-14
Payer: COMMERCIAL

## 2020-02-14 VITALS
HEART RATE: 75 BPM | BODY MASS INDEX: 37.8 KG/M2 | DIASTOLIC BLOOD PRESSURE: 74 MMHG | WEIGHT: 270 LBS | RESPIRATION RATE: 12 BRPM | OXYGEN SATURATION: 98 % | HEIGHT: 71 IN | SYSTOLIC BLOOD PRESSURE: 132 MMHG | TEMPERATURE: 98.1 F

## 2020-02-14 PROCEDURE — 99214 OFFICE O/P EST MOD 30 MIN: CPT | Performed by: FAMILY MEDICINE

## 2020-02-14 RX ORDER — FLUCONAZOLE 150 MG/1
150 TABLET ORAL ONCE
Qty: 1 TABLET | Refills: 0 | Status: SHIPPED | OUTPATIENT
Start: 2020-02-14 | End: 2020-06-16

## 2020-02-14 ASSESSMENT — PATIENT HEALTH QUESTIONNAIRE - PHQ9
SUM OF ALL RESPONSES TO PHQ QUESTIONS 1-9: 0
1. LITTLE INTEREST OR PLEASURE IN DOING THINGS: 0
SUM OF ALL RESPONSES TO PHQ9 QUESTIONS 1 & 2: 0
2. FEELING DOWN, DEPRESSED OR HOPELESS: 0
SUM OF ALL RESPONSES TO PHQ QUESTIONS 1-9: 0

## 2020-02-14 NOTE — PROGRESS NOTES
Patient Active Problem List   Diagnosis    Lymphedema of left lower extremity    FH: hemochromatosis    Essential hypertension    Pure hypercholesterolemia    Gastroesophageal reflux disease    Vitamin D deficiency    Hypertensive left ventricular hypertrophy, without heart failure       Past Medical History:   Diagnosis Date    Hypertension     Lymphedema        Past Surgical History:   Procedure Laterality Date    BLADDER SURGERY  2009    bladder suspension     SECTION      x2    ECTOPIC PREGNANCY SURGERY      LYMPH NODE DISSECTION Left 1979    removal of lymph node removal left groin    OTHER SURGICAL HISTORY      septum removal in uterus        Family History   Problem Relation Age of Onset    Hypertension Mother     Thyroid Disease Mother     Breast Cancer Sister 43    Thyroid Disease Sister         Grave's disease    Hemochromatosis Maternal Grandmother     Diabetes Paternal Grandmother        Social History     Tobacco Use    Smoking status: Former Smoker     Packs/day: 1.00     Years: 8.00     Pack years: 8.00     Last attempt to quit: 2002     Years since quittin.4    Smokeless tobacco: Never Used   Substance Use Topics    Alcohol use: Yes     Alcohol/week: 1.0 standard drinks     Types: 1 Glasses of wine per week     Comment: 2 on the weekends occasionally    Drug use: No            Review of Systems  Review of Systems    Objective:   Physical Exam  Vitals:    20 1636 20 1705   BP: (!) 154/91 132/74   Pulse: 75    Resp: 12    Temp: 98.1 °F (36.7 °C)    TempSrc: Oral    SpO2: 98%    Weight: 270 lb (122.5 kg)    Height: 5' 11\" (1.803 m)      BP Readings from Last 3 Encounters:   20 132/74   19 (!) 144/86   19 (!) 140/80      Physical Exam  .NAD    Skin is warm and dry. No rash. Well hydrated  Alert and oriented x 3.   Mood and affect are normal.  The neck is supple and free of adenopathy or masses, the thyroid is normal without enlargement or nodules. Chest: clear with no wheezes or rales. No retractions, or use of accessory muscles noted. Cardiovascular: PMI is not displaced, and no thrill noted. Regular rate and rhythm with no rub, murmur or gallop. No carotid bruits. The abdomen is soft without tenderness, guarding, mass, rebound or organomegaly. Aorta, femoral, DP and PT pulses intact. Left leg severely swollen and woody. No erythema or induration. No pitting edema. No erythema or induration of the skin. No ulceration       Assessment:       Diagnosis Orders   1. Lymphedema of left lower extremity  PT lymphedema evaluation and treatment  Continue support hose. Increase lymphedema pump to 1 hour 3 times daily over the weekend. appt with PT for massage if home tx is not effective. Augmentin if s/s cellulitis developed. Follow up if ineffective   2. Essential hypertension  A bit above the goal of < 130/80 but improved  DASH diet. Monitor. Consider dose adjustment. Plan:      Side effects of current medications reviewed and questions answered. Call or return to clinic prn if these symptoms worsen or fail to improve as anticipated.

## 2020-03-09 ENCOUNTER — OFFICE VISIT (OUTPATIENT)
Dept: PSYCHOLOGY | Age: 50
End: 2020-03-09
Payer: COMMERCIAL

## 2020-03-09 PROCEDURE — 90791 PSYCH DIAGNOSTIC EVALUATION: CPT | Performed by: PSYCHOLOGIST

## 2020-03-09 ASSESSMENT — PATIENT HEALTH QUESTIONNAIRE - PHQ9
3. TROUBLE FALLING OR STAYING ASLEEP: 1
1. LITTLE INTEREST OR PLEASURE IN DOING THINGS: 0
SUM OF ALL RESPONSES TO PHQ QUESTIONS 1-9: 7
5. POOR APPETITE OR OVEREATING: 3
9. THOUGHTS THAT YOU WOULD BE BETTER OFF DEAD, OR OF HURTING YOURSELF: 0
2. FEELING DOWN, DEPRESSED OR HOPELESS: 0
8. MOVING OR SPEAKING SO SLOWLY THAT OTHER PEOPLE COULD HAVE NOTICED. OR THE OPPOSITE, BEING SO FIGETY OR RESTLESS THAT YOU HAVE BEEN MOVING AROUND A LOT MORE THAN USUAL: 0
7. TROUBLE CONCENTRATING ON THINGS, SUCH AS READING THE NEWSPAPER OR WATCHING TELEVISION: 1
4. FEELING TIRED OR HAVING LITTLE ENERGY: 1
SUM OF ALL RESPONSES TO PHQ QUESTIONS 1-9: 7
SUM OF ALL RESPONSES TO PHQ9 QUESTIONS 1 & 2: 0
6. FEELING BAD ABOUT YOURSELF - OR THAT YOU ARE A FAILURE OR HAVE LET YOURSELF OR YOUR FAMILY DOWN: 1

## 2020-03-09 ASSESSMENT — ANXIETY QUESTIONNAIRES
3. WORRYING TOO MUCH ABOUT DIFFERENT THINGS: 2-OVER HALF THE DAYS
5. BEING SO RESTLESS THAT IT IS HARD TO SIT STILL: 2-OVER HALF THE DAYS
6. BECOMING EASILY ANNOYED OR IRRITABLE: 1-SEVERAL DAYS
1. FEELING NERVOUS, ANXIOUS, OR ON EDGE: 1-SEVERAL DAYS
GAD7 TOTAL SCORE: 12
2. NOT BEING ABLE TO STOP OR CONTROL WORRYING: 2-OVER HALF THE DAYS
4. TROUBLE RELAXING: 3-NEARLY EVERY DAY
7. FEELING AFRAID AS IF SOMETHING AWFUL MIGHT HAPPEN: 1-SEVERAL DAYS

## 2020-03-09 NOTE — PROGRESS NOTES
anymore. Stopped 18 years ago. Caffeine: Loves coffee. Drinks 90% decaf. SI/HI: No  Mental health history: When she was younger, dealt with some depression. Pt's cousin committed suicide when pt was 15, and this really affected her. Saw a counselor 5-6 times, felt better. Developed depression at 19-20. Changed everything (e.g., new job), felt better. Social History     Tobacco Use    Smoking status: Former Smoker     Packs/day: 1.00     Years: 8.00     Pack years: 8.00     Last attempt to quit: 2002     Years since quittin.4    Smokeless tobacco: Never Used   Substance Use Topics    Alcohol use: Yes     Alcohol/week: 1.0 standard drinks     Types: 1 Glasses of wine per week     Comment: 2 on the weekends occasionally      Illicit drugs:   Social History     Substance and Sexual Activity   Drug Use No        O:  Patient reported anxious and depressive symptoms stemming from her daughter's chronic mental health struggles and suicidal ideation. Patient's self-care has been limited as she has focused on supporting her family, but as a result she has been feeling less like herself in recent years. Normalized and validated her distress. Provided psychoeducation about the importance of prioritizing self-care, especially for caregivers. Reinforced her increasing efforts towards self-care through social outlets and regular exercise. Pt denied past or recent SI/HI, plan or intent. No safety concerns at this time.       A:  MSE:  Appearance: good hygiene  and appropriate attire  Attitude: cooperative, friendly, tearful and mild distress  Consciousness: alert  Orientation: oriented to person, place, time, general circumstance  Memory: recent and remote memory intact  Attention/Concentration: intact during session  Psychomotor Activity: normal  Eye Contact: normal  Speech: normal rate and volume, well-articulated  Mood: anxious, mildly dysphoric  Affect: congruent  Perception: within normal limits  Thought

## 2020-04-02 ENCOUNTER — PATIENT MESSAGE (OUTPATIENT)
Dept: FAMILY MEDICINE CLINIC | Age: 50
End: 2020-04-02

## 2020-04-03 ENCOUNTER — PATIENT MESSAGE (OUTPATIENT)
Dept: FAMILY MEDICINE CLINIC | Age: 50
End: 2020-04-03

## 2020-04-03 RX ORDER — METFORMIN HYDROCHLORIDE 500 MG/1
1000 TABLET, EXTENDED RELEASE ORAL
Qty: 60 TABLET | Refills: 2 | Status: SHIPPED | OUTPATIENT
Start: 2020-04-03 | End: 2020-07-07

## 2020-04-03 NOTE — TELEPHONE ENCOUNTER
Please advise pt mycguillermo message  Thank you    12.27.2019  A1C  5.5                      eAG  111.2                      glucose 113

## 2020-06-15 ENCOUNTER — TELEPHONE (OUTPATIENT)
Dept: FAMILY MEDICINE CLINIC | Age: 50
End: 2020-06-15

## 2020-06-15 ENCOUNTER — PATIENT MESSAGE (OUTPATIENT)
Dept: FAMILY MEDICINE CLINIC | Age: 50
End: 2020-06-15

## 2020-06-16 RX ORDER — VALSARTAN AND HYDROCHLOROTHIAZIDE 160; 25 MG/1; MG/1
1 TABLET ORAL DAILY
Qty: 90 TABLET | Refills: 0 | Status: SHIPPED | OUTPATIENT
Start: 2020-06-16 | End: 2020-08-17 | Stop reason: SDUPTHER

## 2020-06-16 RX ORDER — FLUCONAZOLE 150 MG/1
TABLET ORAL
Qty: 1 TABLET | Refills: 0 | Status: SHIPPED | OUTPATIENT
Start: 2020-06-16 | End: 2020-08-17

## 2020-06-16 NOTE — TELEPHONE ENCOUNTER
From: Junior Hayward  To: Roberta Chairez MD  Sent: 6/15/2020 10:51 AM EDT  Subject: Non-Urgent Medical Question    Good morning , just tisha to let you Know that i just stopped taking metformin. I try 2 tablets in the morning for a couple of weeks And i had servere diarhea. I Then did 1 tab in the morning and 1 at dinner for a couple of weeks, same thing. I then did 1 tablet a day and same thing. I think I really gave it a good try. For the past 2 days I have been having discomfort in my pelvic area . I did an Azo test strip and believe I might have an uti. i attached a picture to this email. I will also call the office. ----- Message -----   From:Lesa Sue MD   Sent:4/3/2020 8:32 AM EDT   To:Ila Vences   Subject:RE: Non-Urgent Medical Question    I can order the Metformin without a visit. If you want to speak with me you can schedule a video vist by calling [phone]. The Metformin will be 2 tabs in the am. If it causes diarrhea, try taking one twice a day. Decrease to one a day or stop taking it if you have significant diarrhea. Let me know if you have any questions or concerns. Stay well. Dr. J Carlos Solano       ----- Message -----   From:Ila Mata   Sent:4/2/2020 4:16 PM EDT   To:Lesa Sue MD   Subject:Non-Urgent Medical Question    For a while I have been debating asking you about metformin. We talked about it a while again but I wasn't ready to add a new medicine to my regiment. with all the stress and the stay at home situation I believe the time is right. Should I book an e visit for this? If yes under what?   Thanks  Ila

## 2020-06-18 ENCOUNTER — HOSPITAL ENCOUNTER (OUTPATIENT)
Dept: WOMENS IMAGING | Age: 50
Discharge: HOME | End: 2020-06-18
Payer: COMMERCIAL

## 2020-06-18 PROCEDURE — 77067 SCR MAMMO BI INCL CAD: CPT

## 2020-06-19 ENCOUNTER — TELEPHONE (OUTPATIENT)
Dept: FAMILY MEDICINE CLINIC | Age: 50
End: 2020-06-19

## 2020-07-21 ENCOUNTER — NURSE TRIAGE (OUTPATIENT)
Dept: OTHER | Facility: CLINIC | Age: 50
End: 2020-07-21

## 2020-07-21 NOTE — TELEPHONE ENCOUNTER
Reason for Disposition   SEVERE pain (e.g., excruciating, unable to do any normal activities)    Answer Assessment - Initial Assessment Questions  1. ONSET: \"When did the pain start?\"      1.5 weeks ago  2. LOCATION: \"Where is the pain located? \"      right  3. PAIN: \"How bad is the pain? \" (Scale 1-10; or mild, moderate, severe)    - MILD (1-3): doesn't interfere with normal activities    - MODERATE (4-7): interferes with normal activities (e.g., work or school) or awakens from sleep    - SEVERE (8-10): excruciating pain, unable to do any normal activities, unable to move arm at all due to pain      severe  4. WORK OR EXERCISE: \"Has there been any recent work or exercise that involved this part of the body? \"      no  5. CAUSE: \"What do you think is causing the shoulder pain? \"      unknown  6. OTHER SYMPTOMS: \"Do you have any other symptoms? \" (e.g., neck pain, swelling, rash, fever, numbness, weakness)      Numbness in right thigh intermittently, pain radiating into right jaw  7. PREGNANCY: \"Is there any chance you are pregnant? \" \"When was your last menstrual period      No    Protocols used: SHOULDER PAIN-ADULT-[place]    Patient called [facility]) to schedule appointment, with red flag complaint, transferred to RN access for triage. Pt calls to report symptoms of severe right shoulder pain. Pt states pain started 1.5 weeks ago but has progressively gotten worse. Pt reports pain is constant but worse when moving the arm. States pain radiates into right jaw like a \"muscle strain\". Denies any chest pain or breathing difficulty at this time. Informed of disposition. Care advice as documented. Instructed to call back with worsening symptoms. Soft transfer to pre-service [place] to schedule appointment as recommended. Please do not respond to the triage nurse through this encounter. Any subsequent communication should be directly with the patient.

## 2020-07-22 ENCOUNTER — TELEPHONE (OUTPATIENT)
Dept: FAMILY MEDICINE CLINIC | Age: 50
End: 2020-07-22

## 2020-07-22 NOTE — TELEPHONE ENCOUNTER
----- Message from Hailey Alcocer sent at 7/21/2020 10:00 AM EDT -----  Subject: Appointment Request    QUESTIONS  Reason for appointment request? No appointments available during search  ---------------------------------------------------------------------------  --------------  [address]  What is the best way for the office to contact you? OK to leave message on   voicemail  Preferred Call Back Phone Number? [phone]  ---------------------------------------------------------------------------  --------------  SCRIPT ANSWERS  Patient needs to be seen today? Yes  Nurse Name? Joni Fernández  (Did RN indicate the need for Red Scheduling?)? No  Have you been diagnosed with COVID-19 (Coronavirus)   tested for COVID-19 (Coronavirus)   or told that you are suspected of having COVID-19 (Coronavirus)? No  Have you had a fever or taken medication to treat a fever within the past   3 days? No  Have you had a cough   shortness of breath or flu-like symptoms within the past 3 days? No  Do you currently have flu-like symptoms including fever or chills   cough   shortness of breath   or difficulty breathing   or new loss of taste or smell? No  (Service Expert  click yes below to proceed with [facility] As Usual   Scheduling)?  Yes

## 2020-08-10 RX ORDER — OMEPRAZOLE 40 MG/1
CAPSULE, DELAYED RELEASE ORAL
Qty: 30 CAPSULE | Refills: 1 | OUTPATIENT
Start: 2020-08-10

## 2020-08-17 ENCOUNTER — OFFICE VISIT (OUTPATIENT)
Dept: FAMILY MEDICINE CLINIC | Age: 50
End: 2020-08-17
Payer: COMMERCIAL

## 2020-08-17 VITALS
TEMPERATURE: 98.1 F | OXYGEN SATURATION: 98 % | DIASTOLIC BLOOD PRESSURE: 78 MMHG | HEART RATE: 89 BPM | RESPIRATION RATE: 14 BRPM | HEIGHT: 71 IN | BODY MASS INDEX: 37.52 KG/M2 | SYSTOLIC BLOOD PRESSURE: 128 MMHG | WEIGHT: 268 LBS

## 2020-08-17 PROCEDURE — 99214 OFFICE O/P EST MOD 30 MIN: CPT | Performed by: FAMILY MEDICINE

## 2020-08-17 RX ORDER — MELOXICAM 15 MG/1
15 TABLET ORAL DAILY
Qty: 30 TABLET | Refills: 3 | Status: SHIPPED | OUTPATIENT
Start: 2020-08-17 | End: 2021-01-05 | Stop reason: SDUPTHER

## 2020-08-17 RX ORDER — VALSARTAN AND HYDROCHLOROTHIAZIDE 160; 25 MG/1; MG/1
1 TABLET ORAL DAILY
Qty: 90 TABLET | Refills: 0 | Status: SHIPPED | OUTPATIENT
Start: 2020-08-17 | End: 2020-12-14

## 2020-08-17 RX ORDER — ATORVASTATIN CALCIUM 20 MG/1
TABLET, FILM COATED ORAL
Qty: 90 TABLET | Refills: 1 | Status: SHIPPED | OUTPATIENT
Start: 2020-08-17 | End: 2021-02-15

## 2020-08-17 NOTE — PROGRESS NOTES
Subjective:      Patient ID: Jaziel Ennis 52 y.o. female. .is here for evaluation for right shoulder pain      HPI    Ila complains of right shoulder pain x 6 months. The left started a bit later. Symptoms come and go. The shoulders pop all the time. Certain movements such as putting on or taking off a shirt is painful. Neck feels tight an radiates to both upper arms. She notes no weakness; occ has pain in the left thumb that makes her drop things ( has a ganglion cyst on the ext tendon that waxes and wanes). She notes tingling in the radial side of the hands on and off; self limiting. No injury or trauma. She had an old muscle relaxant that she tool with Ibuprofen 600 mg a few times - it did help. She went to the ER last November with pain. She was told she had calcific tendonitis. Hypertension: Patient here for follow-up of elevated blood pressure. She is exercising and is adherent to low salt diet. Blood pressure is well controlled at home. Cardiac symptoms none. Patient denies chest pressure/discomfort, dyspnea, exertional chest pressure/discomfort, irregular heart beat, lower extremity edema and palpitations. Cardiovascular risk factors: dyslipidemia, hypertension and obesity (BMI >= 30 kg/m2). Use of agents associated with hypertension: none. History of target organ damage: none. .  Hyperlipidemia:  No new myalgias or GI upset on atorvastatin (Lipitor). Medication compliance: compliant most of the time. Patient is  following a low fat, low cholesterol diet. She is  exercising regularly.      Lab Results   Component Value Date    CHOL 150 12/27/2019    TRIG 145 12/27/2019    HDL 45 12/27/2019    LDLCALC 76 12/27/2019     Lab Results   Component Value Date    ALT 44 (H) 12/27/2019    AST 27 12/27/2019        Labs Renal Latest Ref Rng & Units 12/27/2019 10/3/2019 7/24/2019 3/6/2019 11/23/2018   BUN 7 - 20 mg/dL 16 15 14 17 16   Cr 0.6 - 1.1 mg/dL 0.7 0.7 0.7 0.6 0.6   K 3.5 - 5.1 mmol/L 3.6 3.8 3.9 3.6 4.2   Na 136 - 145 mmol/L 136 139 138 138 142       Outpatient Medications Marked as Taking for the 20 encounter (Office Visit) with Mann Downs MD   Medication Sig Dispense Refill    omeprazole (PRILOSEC) 40 MG delayed release capsule TAKE ONE CAPSULE BY MOUTH DAILY 30 capsule 0    valsartan-hydroCHLOROthiazide (DIOVAN-HCT) 160-25 MG per tablet Take 1 tablet by mouth daily 90 tablet 0    atorvastatin (LIPITOR) 20 MG tablet TAKE ONE TABLET BY MOUTH DAILY 90 tablet 1    loratadine (CLARITIN) 10 MG tablet Take 10 mg by mouth daily          Allergies   Allergen Reactions    Latex     Bactrim [Sulfamethoxazole-Trimethoprim] Rash       Patient Active Problem List   Diagnosis    Lymphedema of left lower extremity    FH: hemochromatosis    Essential hypertension    Pure hypercholesterolemia    Gastroesophageal reflux disease    Vitamin D deficiency    Hypertensive left ventricular hypertrophy, without heart failure       Past Medical History:   Diagnosis Date    Hypertension     Lymphedema        Past Surgical History:   Procedure Laterality Date    BLADDER SURGERY  2009    bladder suspension     SECTION      x2    ECTOPIC PREGNANCY SURGERY      LYMPH NODE DISSECTION Left 1979    removal of lymph node removal left groin    OTHER SURGICAL HISTORY      septum removal in uterus        Family History   Problem Relation Age of Onset    Hypertension Mother     Thyroid Disease Mother     Breast Cancer Sister 43    Thyroid Disease Sister         Grave's disease    Hemochromatosis Maternal Grandmother     Diabetes Paternal Grandmother        Social History     Tobacco Use    Smoking status: Former Smoker     Packs/day: 1.00     Years: 8.00     Pack years: 8.00     Last attempt to quit: 2002     Years since quittin.9    Smokeless tobacco: Never Used   Substance Use Topics    Alcohol use:  Yes     Alcohol/week: 1.0 standard drinks     Types: 1 Glasses of wine per week     Comment: 2 on the weekends occasionally    Drug use: No            Review of Systems  Review of Systems    Objective:   Physical Exam  Vitals:    08/17/20 1658 08/17/20 1747   BP: (!) 169/79 128/78   Pulse: 89    Resp: 14    Temp: 98.1 °F (36.7 °C)    TempSrc: Temporal    SpO2: 98%    Weight: 268 lb (121.6 kg)    Height: 5' 11\" (1.803 m)        Physical Exam  Constitutional:       General: She is not in acute distress. Appearance: She is well-developed. Cardiovascular:      Rate and Rhythm: Normal rate and regular rhythm. Heart sounds: Normal heart sounds. Pulmonary:      Effort: Pulmonary effort is normal.      Breath sounds: Normal breath sounds. Musculoskeletal:      Right shoulder: She exhibits tenderness. She exhibits normal range of motion, no bony tenderness, no swelling, no effusion, no crepitus, no deformity, no spasm, normal pulse and normal strength. Left shoulder: She exhibits tenderness and decreased strength. She exhibits normal range of motion, no bony tenderness, no swelling, no effusion, no crepitus, no deformity, no spasm and normal pulse. Arms:    Skin:     General: Skin is warm and dry. Assessment:       Diagnosis Orders   1. Tendinitis of right rotator cuff  meloxicam (MOBIC) 15 MG tablet   2. Rotator cuff tendinitis, left  [facility] left. Ortho referral.   3. Essential hypertension  valsartan-hydroCHLOROthiazide (DIOVAN-HCT) 160-25 MG per tablet  At goal < 130/80  Continue current rx  Labs due 3 mos   4. Pure hypercholesterolemia  atorvastatin (LIPITOR) 20 MG tablet  LDL at goal < 130. Continue statin, diet, exercise  Labs due 3 mos          Plan:      Side effects of current medications reviewed and questions answered. Call or return to clinic prn if these symptoms worsen or fail to improve as anticipated. Follow up in 3 months or prn.

## 2020-08-31 ENCOUNTER — PATIENT MESSAGE (OUTPATIENT)
Dept: FAMILY MEDICINE CLINIC | Age: 50
End: 2020-08-31

## 2020-09-01 NOTE — TELEPHONE ENCOUNTER
From: Eusebio Degroot  To: Kim Freeman MD  Sent: 8/31/2020  9:02 PM EDT  Subject: Non-Urgent Medical Question    I was just wondering if you have the flu shot available yet? And if yes , how is it going to work this year? Do I need an appointment ,etc...       Thanks   Rony

## 2020-09-09 ENCOUNTER — OFFICE VISIT (OUTPATIENT)
Dept: ORTHOPEDIC SURGERY | Age: 50
End: 2020-09-09
Payer: COMMERCIAL

## 2020-09-09 VITALS — HEIGHT: 71 IN | TEMPERATURE: 98.8 F | WEIGHT: 268.08 LBS | BODY MASS INDEX: 37.53 KG/M2

## 2020-09-09 PROCEDURE — 99243 OFF/OP CNSLTJ NEW/EST LOW 30: CPT | Performed by: ORTHOPAEDIC SURGERY

## 2020-09-09 NOTE — LETTER
Physical Therapy Rehabilitation Referral    Patient Name: Jemma Wright      YOB: 1970    Diagnosis:    R AC joint arthrits, L rotator cuff tendonitis  Precautions:   non  Date of Prescription:  9/9/20    [x] Evaluate and Treat    Post Op Instructions:  [] Continuous passive motion (CPM) [] Elbow ROM  [] Exercise in plane of scapula  []  Strengthening     [] Pulley and instruction   [] Home exercise program (copy to patient)   [] Sling when arm at risk  [] Sling or brace at all times   [] AAROM: Forward elevation to             [] AAROM: External rotation  To      [] Isometric external rotator strengthening [] AAROM: internal rotation: up the back  [] Isometric abductor strengthening  [] AAROM: Internal abduction   [] Isometric internal rotator strengthening [] AAROM: cross-body adduction             Stretching:     Strengthening:  [x] Four quadrant (FE, ER, IR, CBA)  [x] Rotator cuff (ER, IR, Abd)  [] Forward Elevation    [] External Rotators     [] External Rotation    [] Internal Rotators  [] Internal Rotation: up/back   [] Abductors     [] Internal Rotation: supine in abduction  [] Sleeper Stretch    [] Flexors  [] Cross-body abduction    [] Extensors  [] Pendulum (FE, Abd/Add, cw/ccw)  [x] Scapular Stabilizers   [] Wall-walking (FE, Abd)        [x] Shoulder shrugs     [] Table slides (FE)                [x] Rhomboid pinch  [] Elbow (flex, ext, pron, sup)        [] Lat.  Pull downs     [] Medial epicondylitis program       [] Forward punch   [] Lateral epicondylitis program       [] Internal rotators     [] Progressive resistive exercises  [] Bench Press        [] Bench press plus  Activities:     [] Lateral pull-downs  [] Rowing     [] Progressive two-hand supine press  [] Stepper/Exercise bike   [] Biceps: curls/supination  [] Swimming  [] Water exercises    Modalities:     Return to Sport:  [x] Of Choice      [] Plyometrics  [] Ultrasound     [] Rhythmic stabilization [] Iontophoresis    [] Core strengthening   [] Moist heat     [] Sports specific program:   [] Massage         [] Cryotherapy      [] Electrical stimulation     [] Paraffin  [] Whirlpool  [] TENS    [x] Home exercise program (copy to patient).         Perform exercises for:  15   minutes    2-3   times/day  [x] Supervised physical therapy  Frequency: []  1x week  [] 2x week  [] 3x week  [] Other:   Duration: [] 2 weeks   [] 4 weeks  [] 6 weeks  [] Other:     Additional Instructions:       Josephine Flor MD  Orthopaedic Fellow  [address] [place] [address]

## 2020-09-09 NOTE — PROGRESS NOTES
Review of Systems   Constitutional: Positive for unexpected weight change. Cardiovascular: Positive for leg swelling. Gastrointestinal:        Hemorrhoids    Genitourinary:        Loss of urine / incontinence    All other systems reviewed and are negative.

## 2020-09-09 NOTE — PROGRESS NOTES
[address] [facility]  Office Visit  New Patient  Date:  2020    Name:  [facility]  Address:  [address] [address]    :  1970      Age:   52 y.o.    SSN:  [id number]      Medical Record Number:  <[id number]>    Chief Complaint:    Bilateral shoulder pain    HPI:   Jaxon Malone is a 52 y.o. female who presents today on referral from Dr. Lurdes Hamilton for consultation and further treatment of ongoing bilateral shoulder pain. One side is not worse than the other. Her pain occurs intermittently. Her last episode was 2.5 weeks ago, currently she is with little pain. She was given Flexeril and ibuprofen which provided relief. She had a prescription for meloxicam in the past but has not utilizes. She never had any associated trauma or falls. She has not experienced any pain in her neck or associated numbness or tingling. Her pain is anterior located when it occurs. It occurs with overhead activity and at night when it is bothersome. The right side she feels popping in her shoulder when she is pulling on her compression stocking over her left leg. She did have 1 bout of severe left shoulder pain and had to go the ER for pain relief and told was told she had calcium deposits in her shoulder-this was several months ago. She played water polo growing up but experienced no injuries at that time. She denies any new numbness, tingling, fevers, chills, chest pain, shortness of breath, or any other new significant symptoms. Pain Assessment  Location of Pain: Shoulder  Location Modifiers: Right, Left  Severity of Pain: 2  Quality of Pain: Aching(pressure)  Duration of Pain: Persistent  Frequency of Pain: Constant  Aggravating Factors:  Other (Comment)  Relieving Factors: Rest  Result of Injury: No  Work-Related Injury: No  Are there other pain locations you wish to document?: No    Past History:  Past Medical History:   Diagnosis Date    Hypertension     Lymphedema        Past Surgical History:   Procedure Laterality Date    BLADDER SURGERY  2009    bladder suspension     SECTION      x2    ECTOPIC PREGNANCY SURGERY      LYMPH NODE DISSECTION Left 1979    removal of lymph node removal left groin    OTHER SURGICAL HISTORY      septum removal in uterus       Social History     Tobacco Use    Smoking status: Former Smoker     Packs/day: 1.00     Years: 8.00     Pack years: 8.00     Last attempt to quit: 2002     Years since quittin.9    Smokeless tobacco: Never Used   Substance Use Topics    Alcohol use: Yes     Alcohol/week: 1.0 standard drinks     Types: 1 Glasses of wine per week     Comment: 2 on the weekends occasionally    Drug use: No        Family History:  family history includes Breast Cancer (age of onset: 43) in her sister; Diabetes in her paternal grandmother; Hemochromatosis in her maternal grandmother; Hypertension in her mother; Thyroid Disease in her mother and sister.          Current Outpatient Medications:     valsartan-hydroCHLOROthiazide (DIOVAN-HCT) 160-25 MG per tablet, Take 1 tablet by mouth daily, Disp: 90 tablet, Rfl: 0    atorvastatin (LIPITOR) 20 MG tablet, TAKE ONE TABLET BY MOUTH DAILY, Disp: 90 tablet, Rfl: 1    meloxicam (MOBIC) 15 MG tablet, Take 1 tablet by mouth daily, Disp: 30 tablet, Rfl: 3    omeprazole (PRILOSEC) 40 MG delayed release capsule, TAKE ONE CAPSULE BY MOUTH DAILY, Disp: 30 capsule, Rfl: 0    loratadine (CLARITIN) 10 MG tablet, Take 10 mg by mouth daily, Disp: , Rfl:       Allergies   Allergen Reactions    Latex     Bactrim [Sulfamethoxazole-Trimethoprim] Rash         Review of Systems: A 14 point review of systems was completed by the patient on 20 and is available in the media section of the scanned medical record and was reviewed today  The review is negative with the exception of those things mentioned in the HPI    Review of Systems Constitutional: Positive for unexpected weight change. Cardiovascular: Positive for leg swelling. Gastrointestinal:        Hemorrhoids    Genitourinary:        Loss of urine / incontinence    All other systems reviewed and are negative. Physical Exam:  Temp 98.8 °F (37.1 °C) (Infrared)   Ht 5' 10.98\" (1.803 m)   Wt 268 lb 1.3 oz (121.6 kg)   LMP 08/12/2020 (Exact Date)   BMI 37.41 kg/m²       General: No acute distress, well nourished  CV: No obvious peripheral edema. Normal peripheral pulses  Neuro: Alert & oriented x 3  Psych: Appropriate mood and affect    Right Shoulder Exam:  Inspection: No gross deformities, no signs of infection. Palpation: Tenderness overlying the AC joint. Active Range of Motion: Forward Elevation 170, Abduction 170, External Rotation 45, Internal Rotation t12  Passive Range of Motion: No restrictions  Strength:  External Rotation 5/5, Internal Rotation 5/5  Special Tests:  No Ariel muscle deformity. neg Saurav test. neg Champagne toast test.  Positive cross body abduction test.  Neurovascular: Sensation to light touch is intact, no motor deficits, palpable radial pulses 2+      Contralateral left Shoulder Exam:  Inspection: No gross deformities, no signs of infection. Palpation: Tender over bicipital groove and supraspinatus footprint. No tenderness overlying the AC joint. Active Range of Motion: Forward Elevation 170, Abduction 170, External Rotation 45, Internal Rotation t12  Passive Range of Motion: No restrictions  Strength:  External Rotation 5/5, Internal Rotation 5/5  Special Tests:  No Ariel muscle deformity. Neurovascular: Sensation to light touch is intact, no motor deficits, palpable radial pulses 2+      Radiographic:  X-rays obtained and reviewed in office, reviewed and interpreted by me today:  3 views of the right shoulder reviewed AP, axillary lateral, scapular Y: No fractures. No dislocation. No glenohumeral osteoarthritis.   AC joint arthritis. 3 views of the left shoulder reviewed AP, axillary lateral, scapular Y: No fractures. No dislocation. No glenohumeral osteoarthritis. Calcific tendinitis noted. AC joint arthritis. Assessment:  Ila Oropeza is a 52 y.o. female who is right-hand dominant with right acromioclavicular arthritis with cuff tendinitis, left calcific tendinitis. This is a very painful condition but amenable to conservative treatment. Impression:  Encounter Diagnoses   Name Primary?  Bilateral shoulder pain, unspecified chronicity     Calcific tendinitis Yes       Office Procedures:  Orders Placed This Encounter   Procedures    XR SHOULDER LEFT (MIN 2 VIEWS)     Standing Status:   Future     Number of Occurrences:   1     Standing Expiration Date:   9/9/2021    XR SHOULDER RIGHT (MIN 2 VIEWS)     Standing Status:   Future     Number of Occurrences:   1     Standing Expiration Date:   9/9/2021   [facility]     Referral Priority:   Routine     Referral Type:   Eval and Treat     Referral Reason:   Specialty Services Required     Requested Specialty:   Physical Therapy     Number of Visits Requested:   1       Plan:   -Recommend use of meloxicam daily. She already has a prescription for this however has not utilized it. We also provided information for Voltaren topical.  -Recommend course of physical therapy. Prescription provided and documented in the chart-we will be going to the [facility] [place] location.  -We will defer any corticosteroid injection at this visit. Will consider if any continued pain at the return visit. -Return in 4 weeks time for reevaluation        Valerio Solis MD  Orthopaedic Fellow  [facility] and [address] [place] [address]      The encounter with Hawa Alvarado was supervised by Dr Sharron Chavez who personally examined the patient and reviewed the plan.      This dictation was performed with a verbal recognition program (DRAGON) and it was checked for

## 2020-10-05 ENCOUNTER — HOSPITAL ENCOUNTER (OUTPATIENT)
Dept: PHYSICAL THERAPY | Age: 50
Setting detail: THERAPIES SERIES
Discharge: HOME | End: 2020-10-05
Payer: COMMERCIAL

## 2020-10-05 PROCEDURE — 97161 PT EVAL LOW COMPLEX 20 MIN: CPT

## 2020-10-05 PROCEDURE — 97112 NEUROMUSCULAR REEDUCATION: CPT

## 2020-10-05 PROCEDURE — 97110 THERAPEUTIC EXERCISES: CPT

## 2020-10-05 NOTE — FLOWSHEET NOTE
[facility], [address] [place] [address], [address] [place] [address]  Phone: [phone]   Fax:     [phone]    Physical Therapy Daily Treatment Note  Date:  10/5/2020    Patient Name:  Haylie Worrell    :  1970  MRN: [id number]  Restrictions/Precautions:    Medical/Treatment Diagnosis Information:  · Diagnosis: M25.511, M25.512 (ICD-10-CM) - Bilateral shoulder pain, unspecified chronicity, M65.20 (ICD-10-CM) - Calcific tendinitis  · Treatment Diagnosis: M25.511, M25.512 (ICD-10-CM) - Bilateral shoulder pain,  Insurance/Certification information:  PT Insurance Information: [employer]  Physician Information:  Referring Practitioner: Colt Jorgensen MD  Has the plan of care been signed (Y/N):        []  Yes  [x]  No     Date of Patient follow up with Physician: 10/21/20      Is this a Progress Report:     []  Yes  [x]  No        If Yes:  Date Range for reporting period:  Beginning 10/5/20  Ending    Progress report will be due (10 Rx or 30 days whichever is less):        Recertification will be due (POC Duration  / 90 days whichever is less):  20        Visit # Insurance Allowable Auth Required   1  10/5 [place]  60 visit limit []  Yes [x]  No        Functional Scale: UEFI 26% deficit  Date assessed:  10/5/20     Latex Allergy:  [x]NO      []YES  Preferred Language for Healthcare:   [x]English       []other:    Pain level:  0/10     SUBJECTIVE:  See eval    OBJECTIVE: See eval 10/5   Observation:    Test measurements:      RESTRICTIONS/PRECAUTIONS: HTN, OA    Exercises/Interventions:   Exercises:  Exercise/Equipment Resistance/Repetitions Other comments   Stretching/PROM     Wand Attempted supine flexion but withheld d/t L shoulder pain 10   Wall slides 10\"hx10 bilat 10/5   Pec stretch 10\"hx10 bilat 10/ door way 90/90   Sleeper stret 10\"hx10 bilat 10/5 side lying             Isometrics     Retraction 1x10 bilat 10/5 prone, cues required for proper activation, reported fatigue in muscles        Weight shift     Flexion     Abduction     External Rotation     Internal Rotation     Biceps     Triceps          PRE's     Flexion     Abduction     External Rotation     Internal Rotation     Shrugs     EXT     Reverse Flys     Serratus     Horizontal Abd with ER     Biceps     Triceps     Retraction          Cable Column/Theraband     External Rotation     Internal Rotation     Shrugs     Lats     Ext     Flex     Scapular Retraction with ER Yellow TB 1x10 bilat 10/5 completed in sitting   BIC     TRIC     PNF          Dynamic Stability          Plyoback          Manual interventions                     Therapeutic Exercise and NMR EXR  [x] (68837) Provided verbal/tactile cueing for activities related to strengthening, flexibility, endurance, ROM  for improvements in scapular, scapulothoracic and UE control with self care, reaching, carrying, lifting, house/yardwork, driving/computer work. [x] (87173) Provided verbal/tactile cueing for activities related to improving balance, coordination, kinesthetic sense, posture, motor skill, proprioception  to assist with  scapular, scapulothoracic and UE control with self care, reaching, carrying, lifting, house/yardwork, driving/computer work. Therapeutic Activities:    [] (89529 or 58658) Provided verbal/tactile cueing for activities related to improving balance, coordination, kinesthetic sense, posture, motor skill, proprioception and motor activation to allow for proper function of scapular, scapulothoracic and UE control with self care, carrying, lifting, driving/computer work.      Home Exercise Program:    [x] (66148) Reviewed/Progressed HEP activities related to strengthening, flexibility, endurance, ROM of scapular, scapulothoracic and UE control with self care, reaching, carrying, lifting, house/yardwork, driving/computer work  [] (64070) Reviewed/Progressed HEP activities related to improving balance, coordination, kinesthetic sense, posture, motor skill, proprioception of scapular, scapulothoracic and UE control with self care, reaching, carrying, lifting, house/yardwork, driving/computer work      Manual Treatments:  PROM / STM / Oscillations-Mobs:  G-I, II, III, IV (PA's, Inf., Post.)  [] (46326) Provided manual therapy to mobilize soft tissue/joints of cervical/CT, scapular GHJ and UE for the purpose of modulating pain, promoting relaxation,  increasing ROM, reducing/eliminating soft tissue swelling/inflammation/restriction, improving soft tissue extensibility and allowing for proper ROM for normal function with self care, reaching, carrying, lifting, house/yardwork, driving/computer work    Modalities:      Charges:  Timed Code Treatment Minutes: 24'+eval   Total Treatment Minutes: 3:58-4:52  54'       [x] EVAL (LOW) 62549 (typically 20 minutes face-to-face)  [] EVAL (MOD) 51670 (typically 30 minutes face-to-face)  [] EVAL (HIGH) 16304 (typically 45 minutes face-to-face)  [] RE-EVAL     [x] UJ(78855) x 1     [] IONTO  [x] NMR (42228) x  1   [] VASO  [] Manual (91099) x      [] Other:  [] TA x      [] Mech Traction (57075)  [] ES(attended) (40230)      [] ES (un) (08839):     GOALS:  Patient stated goal: Prevent return in shoulder symptoms     []? Progressing: []? Met: []? Not Met: []? Adjusted     Therapist goals for Patient:   Short Term Goals: To be achieved in: 4 weeks  1. Independent in HEP and progression per patient tolerance, in order to prevent re-injury. []? Progressing: []? Met: []? Not Met: []? Adjusted   2. Patient will have a decrease in pain to facilitate improvement in movement, function, and ADLs as indicated by Functional Deficits. []? Progressing: []? Met: []? Not Met: []? Adjusted     Long Term Goals: To be achieved in: 6 weeks  1. Disability index score of 5% or less for the UEFS to assist with reaching prior level of function. []? Progressing: []? Met: []?  Not Met: []? Adjusted  2. Patient will demonstrate increased AROM that is WNL and painfree bilat to allow for proper joint functioning as indicated by patients Functional Deficits. []? Progressing: []? Met: []? Not Met: []? Adjusted  3. Patient will demonstrate an increase in bilat strength 5/5 in all directions to allow for proper functional mobility as indicated by patients Functional Deficits. []? Progressing: []? Met: []? Not Met: []? Adjusted  4. Patient will return to all functional activities without increased symptoms or restriction. []? Progressing: []? Met: []? Not Met: []? Adjusted  5. Patient will have increase in shoulder strength and ROM in order to reach overhead without c/o pain or restrictions. []? Progressing: []? Met: []? Not Met: []? Adjusted       Overall Progression Towards Functional goals/ Treatment Progress Update:  [] Patient is progressing as expected towards functional goals listed. [] Progression is slowed due to complexities/Impairments listed. [] Progression has been slowed due to co-morbidities. [x] Plan just implemented, too soon to assess goals progression <30days   [] Goals require adjustment due to lack of progress  [] Patient is not progressing as expected and requires additional follow up with physician  [] Other    Prognosis for POC: [x] Good [] Fair  [] Poor      Patient requires continued skilled intervention: [x] Yes  [] No    Treatment/Activity Tolerance:  [x] Patient able to complete treatment  [] Patient limited by fatigue  [] Patient limited by pain     [] Patient limited by other medical complications  [] Other:   10/5 Required cues for proper sequencing with exercises but able to complete all exercises for HEP without increase in pain and proper form noted. Patient Education:              10/5 Educated on precautions based on objective measures and symptoms, use of ice and importance of HEP.   Medbridge code: [facility]       PLAN: See eval  [] Continue per plan of care [] Alter current plan (see comments above)  [x] Plan of care initiated [] Hold pending MD visit [] Discharge      Electronically signed by:  Steve Sheffield PT    Note: If patient does not return for scheduled/ recommended follow up visits, this note will serve as a discharge from care along with most recent update on progress.

## 2020-10-05 NOTE — PLAN OF CARE
The [address] [place] [address] and [address]  Phone [phone]  Fax [phone]      Physical Therapy Certification    Dear Referring Practitioner: Marquis Chairez MD,    We had the pleasure of evaluating the following patient for physical therapy services at [address] [place] [address]. A summary of our findings can be found in the initial assessment below. This includes our plan of care. If you have any questions or concerns regarding these findings, please do not hesitate to contact me at the office phone number checked above. Thank you for the referral.       Physician Signature:_______________________________Date:__________________  By signing above (or electronic signature), therapists plan is approved by physician      Patient: Diane Parrish   : 1970   MRN: [id number]  Referring Physician: Referring Practitioner: Marquis Chairez MD      Evaluation Date: 10/5/2020      Medical Diagnosis Information:  Diagnosis: F02.859, M25.512 (ICD-10-CM) - Bilateral shoulder pain, unspecified chronicity, M65.20 (ICD-10-CM) - Calcific tendinitis   Treatment Diagnosis: M25.511, M25.512 (ICD-10-CM) - Bilateral shoulder pain,                                         Insurance information: PT Insurance Information: [place]    Precautions/ Contra-indications: HTN, OA    C-SSRS Triggered by Intake questionnaire (Past 2 wk assessment):   [x] No, Questionnaire did not trigger screening.   [] Yes, Patient intake triggered further evaluation      [] C-SSRS Screening completed  [] PCP notified via Plan of Care  [] Emergency services notified     Latex Allergy:  [x]NO      []YES  Preferred Language for Healthcare:   [x]English       []other:    SUBJECTIVE: Patient arrived to physical therapy with c/o bilat shoulder pain and no known mechanism of injury.  Symptoms located at anterior aspect of shoulder and occurs intermittently with overhead activities and at night. Experiences R shoulder popping when pulling on compression stockings. Experiences severe pain in L shoulder over one ago forcing her to go the ER. She was told she had calcium deposits. Has attempted to treat symptoms by see her PCP and prescribed Flexeril and ibuprofen which provided relief. D/t continued symptoms she saw referring MD and had x-ray demonstrating calcific tendinitis and AC joint arthritis. Based on MD appointment on 9/9/20 it was recommended she use Meloxicam daily as well as Voltaren topical gel and attempt physical therapy. Has not used the Voltaren gel but experiencing relief with Meloxicam. Currently not having any pain but feels more restricted on L shoulder compared to R. Scheduled to f/u with MD on 10/21/20      R hand dominant     Relevant Medical History: Denies previous UE or cervical pathologies. See attached medical history. Lymphedema L LE for over 40 years treats compression. Functional Disability Index: UEFI 26% deficit    Pain Scale: 0/10  Easing factors: Meloxicam   Provocative factors: Reaching overhead, rotating shoulder with UE oustreated     Type: []Constant   [x]Intermittent  []Radiating []Localized []other:     Numbness/Tingling: Denies d/t current injury.  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    Living Status/Prior Level of Function: Independent with ADLs and IADLs,     OBJECTIVE:     ROM PROM AROM  Comment    L R L R    Flexion 170 supine with end range pain 178 supine with end range pain  157 seated mild end range discomfort  155 seated    Abduction 180 supine 180 supine  172 seated 172 seated      measured 90/90 with pain  115 measured 90/90 108 measured 90/90 110 measured 90/90 Assessed in supine    IR 60 supine measured 90/90 57 supine measured 90/90 T8 T9 AROM seated reaching behind back        Strength L R Comment   Flexion 4+ 4+    Abduction 4+ 5 ER 4 4+    IR 5 5    Biceps 5 5    Triceps 5 5      Special Tests Results/Comment   [place] Pos L  Neg R   Neers Pos L for mild shoulder hike/ discomfort  Neg R   Cross body with overpressure Neg L  Pos R for posterior shoulder pain   OBriens Neg L  Neg R   Speeds Pos L  Neg R   Yergason's  Neg L   Neg R       Reflexes/Sensation:               [x]Dermatomes/Myotomes intact               []Reflexes equal and normal bilaterally               []Other:     Joint mobility:               []Normal               [x]Hypo glenohumeral joint               []Hyper     Palpation: TTP L proximal/ lateral deltoin     Functional Mobility/Transfers: Indepndent     Posture: Forward head and rounded shoulders, R protracted compared to L and L shoulder elevated compared to R      Bandages/Dressings/Incisions: N/A     Gait: Independent, WNL     Orthopedic Special Tests: See abovve                        [x] Patient history, allergies, meds reviewed. Medical chart reviewed. See intake form. Review Of Systems (ROS):  [x]Performed Review of systems (Integumentary, CardioPulmonary, Neurological) by intake and observation. Intake form has been scanned into medical record. Patient has been instructed to contact their primary care physician regarding ROS issues if not already being addressed at this time.        Co-morbidities/Complexities (which will affect course of rehabilitation):   []None              Arthritic conditions   []Rheumatoid arthritis (M05.9)  [x]Osteoarthritis (M19.91)    Cardiovascular conditions   [x]Hypertension (I10)  []Hyperlipidemia (E78.5)  []Angina pectoris (I20)  []Atherosclerosis (I70)    Musculoskeletal conditions   []Disc pathology   []Congenital spine pathologies   []Prior surgical intervention  []Osteoporosis (M81.8)  []Osteopenia (M85.8)   Endocrine conditions   []Hypothyroid (E03.9)  []Hyperthyroid Gastrointestinal conditions   []Constipation (A01.22)    Metabolic conditions   []Morbid obesity (E66.01)  []Diabetes type 1(E10.65) or 2 (E11.65)   []Neuropathy (G60.9)      Pulmonary conditions   []Asthma (J45)  []Coughing   []COPD (J44.9)    Psychological Disorders  []Anxiety (F41.9)  []Depression (F32.9)   []Other:    []Other:      Lymphedema       Barriers to/and or personal factors that will affect rehab potential:              [x]Age  []Sex              []Motivation/Lack of Motivation                        []Co-Morbidities              []Cognitive Function, education/learning barriers              []Environmental, home barriers              [x]profession/work barriers  []past PT/medical experience  []other:  Justification: Pt's age, chronic nature of symptoms and 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   [] Falls Risk assessed and Patient requires intervention due to being higher risk   TUG score (>12s at risk):     [] Falls education provided, including         ASSESSMENT:   Functional Impairments              [x]Noted spinal or UE joint hypomobility              []Noted spinal or UE joint hypermobility              [x]Decreased UE functional ROM              [x]Decreased UE functional strength              []Abnormal reflexes/sensation/myotomal/dermatomal deficits              []Decreased RC/scapular/core strength and neuromuscular control              []other:       Functional Activity Limitations (from functional questionnaire and intake)              []Reduced ability to tolerate prolonged functional positions              []Reduced ability or difficulty with changes of positions or transfers between positions              [x]Reduced ability to maintain good posture and demonstrate good body mechanics with sitting, bending, and lifting              [x] Reduced ability or tolerance with driving and/or computer work              [x]Reduced ability to sleep              [x]Reduced ability to perform lifting, reaching, carrying tasks              [x]Reduced ability to tolerate impact through UE              []Reduced ability to reach behind back              []Reduced ability to  or hold objects              [x]Reduced ability to throw or toss an object              []other:       Participation Restrictions              []Reduced participation in self care activities              [x]Reduced participation in home management activities              [x]Reduced participation in work activities              [x]Reduced participation in social activities. []Reduced participation in sport / recreational activities. Classification:              []Signs/symptoms consistent with post-surgical status including decreased ROM, strength and function.   []Signs/symptoms consistent with joint sprain/strain              [x]Signs/symptoms consistent with shoulder impingement              []Signs/symptoms consistent with shoulder/elbow/wrist tendinopathy              []Signs/symptoms consistent with Rotator cuff tear              []Signs/symptoms consistent with labral tear              []Signs/symptoms consistent with postural dysfunction                         []Signs/symptoms consistent with Glenohumeral IR Deficit - <45 degrees              []Signs/symptoms consistent with facet dysfunction of cervical/thoracic spine                         []Signs/symptoms consistent with pathology which may benefit from Dry needling                []other:      Prognosis/Rehab Potential:                                       []Excellent              [x]Good                 []Fair              []Poor     Tolerance of evaluation/treatment:               []Excellent              [x]Good                 []Fair              []Poor     Physical Therapy Evaluation Complexity Justification  [x] A history of present problem with:  [] no personal factors and/or with Towel - 10 reps - 1 sets - 10 hold - 1-2x daily - 7x weekly   Sleeper Stretch - 10 reps - 1 sets - 10 hold - 1-2x daily - 7x weekly   Prone Scapular Retraction - 10 reps - 2-3 sets - 1-2x daily - 7x weekly   Shoulder External Rotation and Scapular Retraction with Resistance - 10 reps - 3 sets - 1-2x daily - 7x weekly        GOALS:   Patient stated goal: Prevent return in shoulder symptoms     [] Progressing: [] Met: [] Not Met: [] Adjusted    Therapist goals for Patient:   Short Term Goals: To be achieved in: 4 weeks  1. Independent in HEP and progression per patient tolerance, in order to prevent re-injury. [] Progressing: [] Met: [] Not Met: [] Adjusted   2. Patient will have a decrease in pain to facilitate improvement in movement, function, and ADLs as indicated by Functional Deficits. [] Progressing: [] Met: [] Not Met: [] Adjusted    Long Term Goals: To be achieved in: 6 weeks  1. Disability index score of 5% or less for the UEFS to assist with reaching prior level of function. [] Progressing: [] Met: [] Not Met: [] Adjusted  2. Patient will demonstrate increased AROM that is WNL and painfree bilat to allow for proper joint functioning as indicated by patients Functional Deficits. [] Progressing: [] Met: [] Not Met: [] Adjusted  3. Patient will demonstrate an increase in bilat strength 5/5 in all directions to allow for proper functional mobility as indicated by patients Functional Deficits. [] Progressing: [] Met: [] Not Met: [] Adjusted  4. Patient will return to all functional activities without increased symptoms or restriction. [] Progressing: [] Met: [] Not Met: [] Adjusted  5. Patient will have increase in shoulder strength and ROM in order to reach overhead without c/o pain or restrictions.    [] Progressing: [] Met: [] Not Met: [] Adjusted     Electronically signed by:  Georgi Rae PT    Note: If patient does not return for scheduled/ recommended follow up visits, this note will serve as a discharge from care along with most recent update on progress.

## 2020-12-14 RX ORDER — VALSARTAN AND HYDROCHLOROTHIAZIDE 160; 25 MG/1; MG/1
TABLET ORAL
Qty: 90 TABLET | Refills: 0 | Status: SHIPPED | OUTPATIENT
Start: 2020-12-14 | End: 2021-03-15

## 2020-12-19 DIAGNOSIS — E78.00 PURE HYPERCHOLESTEROLEMIA: ICD-10-CM

## 2020-12-19 LAB
A/G RATIO: 1.9 (ref 1.1–2.2)
ALBUMIN SERPL-MCNC: 4.3 G/DL (ref 3.4–5)
ALP BLD-CCNC: 58 U/L (ref 40–129)
ALT SERPL-CCNC: 30 U/L (ref 10–40)
ANION GAP SERPL CALCULATED.3IONS-SCNC: 12 MMOL/L (ref 3–16)
AST SERPL-CCNC: 22 U/L (ref 15–37)
BILIRUB SERPL-MCNC: 0.4 MG/DL (ref 0–1)
BUN BLDV-MCNC: 16 MG/DL (ref 7–20)
CALCIUM SERPL-MCNC: 9.1 MG/DL (ref 8.3–10.6)
CHLORIDE BLD-SCNC: 99 MMOL/L (ref 99–110)
CHOLESTEROL, TOTAL: 148 MG/DL (ref 0–199)
CO2: 26 MMOL/L (ref 21–32)
CREAT SERPL-MCNC: 0.6 MG/DL (ref 0.6–1.1)
GFR AFRICAN AMERICAN: >60
GFR NON-AFRICAN AMERICAN: >60
GLOBULIN: 2.3 G/DL
GLUCOSE BLD-MCNC: 102 MG/DL (ref 70–99)
HDLC SERPL-MCNC: 45 MG/DL (ref 40–60)
LDL CHOLESTEROL CALCULATED: 77 MG/DL
POTASSIUM SERPL-SCNC: 4.1 MMOL/L (ref 3.5–5.1)
SODIUM BLD-SCNC: 137 MMOL/L (ref 136–145)
TOTAL PROTEIN: 6.6 G/DL (ref 6.4–8.2)
TRIGL SERPL-MCNC: 128 MG/DL (ref 0–150)
TSH REFLEX: 1.84 UIU/ML (ref 0.27–4.2)
VLDLC SERPL CALC-MCNC: 26 MG/DL

## 2021-01-04 ENCOUNTER — PATIENT MESSAGE (OUTPATIENT)
Dept: FAMILY MEDICINE CLINIC | Age: 51
End: 2021-01-04

## 2021-01-04 NOTE — TELEPHONE ENCOUNTER
From: Khadra Daniel  To: Marilia Conway MD  Sent: 1/4/2021 6:52 AM EST  Subject: Non-Urgent Medical Question    On December 29th I did a telehealth apt with my insurance because I thought I had a UTI. I did a at home UTI test strip, which indicated strong positive for Leukocytes, and negative for Nitrite. I was prescribed antibiotics. But I think I'm having kidney stones. It has not improved, as continue to have suddenly onset of lover back pain, chills and Nausea, and then a few minutes later it is gone. I'm drinking lots of water to try to pass it. I'm on day 3 on severe pain. Should I wait and continue what I'm doing, or should I try to get an appointment with you? Is there any of the medications that I'm taking that could cause this?   Thank you  Ila Mata

## 2021-01-05 ENCOUNTER — OFFICE VISIT (OUTPATIENT)
Dept: FAMILY MEDICINE CLINIC | Age: 51
End: 2021-01-05
Payer: COMMERCIAL

## 2021-01-05 VITALS
WEIGHT: 265.8 LBS | TEMPERATURE: 97.3 F | BODY MASS INDEX: 37.09 KG/M2 | OXYGEN SATURATION: 97 % | RESPIRATION RATE: 16 BRPM | HEART RATE: 83 BPM | DIASTOLIC BLOOD PRESSURE: 86 MMHG | SYSTOLIC BLOOD PRESSURE: 130 MMHG

## 2021-01-05 DIAGNOSIS — R10.9 ABDOMINAL PAIN, UNSPECIFIED ABDOMINAL LOCATION: Primary | ICD-10-CM

## 2021-01-05 DIAGNOSIS — N23 RENAL COLIC ON LEFT SIDE: ICD-10-CM

## 2021-01-05 DIAGNOSIS — R31.9 HEMATURIA, UNSPECIFIED TYPE: ICD-10-CM

## 2021-01-05 LAB
BILIRUBIN, POC: NEGATIVE
BLOOD URINE, POC: ABNORMAL
CLARITY, POC: CLEAR
COLOR, POC: YELLOW
GLUCOSE URINE, POC: NEGATIVE
KETONES, POC: NEGATIVE
LEUKOCYTE EST, POC: NEGATIVE
NITRITE, POC: NEGATIVE
PH, POC: 7
PROTEIN, POC: NEGATIVE
SPECIFIC GRAVITY, POC: 1.01
UROBILINOGEN, POC: 0.2

## 2021-01-05 PROCEDURE — 99214 OFFICE O/P EST MOD 30 MIN: CPT | Performed by: FAMILY MEDICINE

## 2021-01-05 PROCEDURE — 81002 URINALYSIS NONAUTO W/O SCOPE: CPT | Performed by: FAMILY MEDICINE

## 2021-01-05 RX ORDER — MULTIVIT-MIN/IRON/FOLIC ACID/K 18-600-40
CAPSULE ORAL
COMMUNITY

## 2021-01-05 RX ORDER — TRAMADOL HYDROCHLORIDE 50 MG/1
50 TABLET ORAL EVERY 6 HOURS PRN
Qty: 12 TABLET | Refills: 0 | Status: SHIPPED | OUTPATIENT
Start: 2021-01-05 | End: 2021-01-08

## 2021-01-05 ASSESSMENT — PATIENT HEALTH QUESTIONNAIRE - PHQ9
SUM OF ALL RESPONSES TO PHQ QUESTIONS 1-9: 0
1. LITTLE INTEREST OR PLEASURE IN DOING THINGS: 0
SUM OF ALL RESPONSES TO PHQ9 QUESTIONS 1 & 2: 0
2. FEELING DOWN, DEPRESSED OR HOPELESS: 0
SUM OF ALL RESPONSES TO PHQ QUESTIONS 1-9: 0
SUM OF ALL RESPONSES TO PHQ QUESTIONS 1-9: 0

## 2021-01-05 NOTE — PROGRESS NOTES
Subjective:      Patient ID: Carmen Nascimento 48 y.o. female. is here for evaluation for dysuria. HPI  HithruT message 1/4/20: On December 29th I did a telehealth apt with my insurance because I thought I had a UTI. I did a at home UTI test strip, which indicated strong positive for Leukocytes, and negative for Nitrite. I was prescribed antibiotics. But I think I'm having kidney stones. It has not improved, as continue to have suddenly onset of lover back pain, chills and Nausea, and then a few minutes later it is gone. I'm drinking lots of water to try to pass it. I'm on day 3 on severe pain. Ila did a home UTI test on 12/29 because she noted moderately severe pressure in the lower abdomen. Is very uncomfortable. Pain in the pelvis is more left sided. No dysuria. Is on menses so not sure if had any hematuria.  + nausea, left lower back pain = aches and occ sharp. No fever, constipation, diarrhea, vaginal discharge. Menses was a bit short but at expected time . + leuk, neg for nitrates. She was prescribed Macrobid by telehealth. Symptoms are not any better. Not better or worse.        Lab Results   Component Value Date     12/19/2020    K 4.1 12/19/2020    CL 99 12/19/2020    CO2 26 12/19/2020    BUN 16 12/19/2020    CREATININE 0.6 12/19/2020    GLUCOSE 102 (H) 12/19/2020    CALCIUM 9.1 12/19/2020    PROT 6.6 12/19/2020    LABALBU 4.3 12/19/2020    BILITOT 0.4 12/19/2020    ALKPHOS 58 12/19/2020    AST 22 12/19/2020    ALT 30 12/19/2020    LABGLOM >60 12/19/2020    GFRAA >60 12/19/2020    AGRATIO 1.9 12/19/2020    GLOB 2.3 12/19/2020        Lab Results   Component Value Date    WBC 9.8 07/24/2019    HGB 16.6 (H) 07/24/2019    HCT 49.5 (H) 07/24/2019    MCV 94.3 07/24/2019     07/24/2019     TSH   Date Value Ref Range Status   12/19/2020 1.84 0.27 - 4.20 uIU/mL Final   03/06/2019 2.46 0.27 - 4.20 uIU/mL Final   11/23/2018 2.32 0.27 - 4.20 uIU/mL Final No outpatient medications have been marked as taking for the 21 encounter (Appointment) with Ruel Veliz MD.        Allergies   Allergen Reactions    Latex     Bactrim [Sulfamethoxazole-Trimethoprim] Rash       Patient Active Problem List   Diagnosis    Lymphedema of left lower extremity    FH: hemochromatosis    Essential hypertension    Pure hypercholesterolemia    Gastroesophageal reflux disease    Vitamin D deficiency    Hypertensive left ventricular hypertrophy, without heart failure       Past Medical History:   Diagnosis Date    Hypertension     Lymphedema        Past Surgical History:   Procedure Laterality Date    BLADDER SURGERY  2009    bladder suspension     SECTION      x2    ECTOPIC PREGNANCY SURGERY      LYMPH NODE DISSECTION Left 1979    removal of lymph node removal left groin    OTHER SURGICAL HISTORY      septum removal in uterus        Family History   Problem Relation Age of Onset    Hypertension Mother     Thyroid Disease Mother     Breast Cancer Sister 43    Thyroid Disease Sister         Grave's disease    Hemochromatosis Maternal Grandmother     Diabetes Paternal Grandmother        Social History     Tobacco Use    Smoking status: Former Smoker     Packs/day: 1.00     Years: 8.00     Pack years: 8.00     Quit date: 2002     Years since quittin.3    Smokeless tobacco: Never Used   Substance Use Topics    Alcohol use: Yes     Alcohol/week: 1.0 standard drinks     Types: 1 Glasses of wine per week     Comment: 2 on the weekends occasionally    Drug use: No            Review of Systems  Review of Systems    Objective:   Physical Exam  Vitals:    21 1230   BP: 130/86   Pulse: 83   Resp: 16   Temp: 97.3 °F (36.3 °C)   TempSrc: Temporal   SpO2: 97%   Weight: 265 lb 12.8 oz (120.6 kg)       Physical Exam  NAD  Skin is warm and dry. Chest is clear, no wheezing or rales. Normal symmetric air entry throughout both lung fields. Heart regular with normal rate, no murmer or gallop  The abdomen is soft with moderate LLQ  tenderness, guarding, mass, rebound or organomegaly. Bowel sounds are normal. No CVA tenderness or inguinal adenopathy noted. No flank tenderness, inguinal LNs. Aorta, femoral, DP and PT pulses intact. Normal bowel sounds    Assessment:       Diagnosis Orders   1. Abdominal pain, unspecified abdominal location  POCT Urinalysis no Micro   2. Hematuria, unspecified type  Culture, Urine   3. Renal colic on left side  traMADol (ULTRAM) 50 MG tablet          Plan:      Consider CT abd/pelvis if cx neg and is not better in 2 to 3 days or prn worsening pain. Rule out infection, stone. Side effects of current medications reviewed and questions answered.

## 2021-01-06 LAB — URINE CULTURE, ROUTINE: NORMAL

## 2021-01-07 ENCOUNTER — PATIENT MESSAGE (OUTPATIENT)
Dept: FAMILY MEDICINE CLINIC | Age: 51
End: 2021-01-07

## 2021-01-07 DIAGNOSIS — R31.29 OTHER MICROSCOPIC HEMATURIA: ICD-10-CM

## 2021-01-07 DIAGNOSIS — R10.9 LEFT FLANK PAIN: Primary | ICD-10-CM

## 2021-01-07 NOTE — TELEPHONE ENCOUNTER
From: Ayden Dawn  To: Elizabeth Ray MD  Sent: 1/7/2021 8:35 AM EST  Subject: Test Results Question    I have a question about CULTURE, URINE resulted on 1/6/21, 7:11 PM.    Im doing a bit better but still have intense pain in the lower back and pressure on my pelvic . I would like to do the ct scan

## 2021-03-25 ENCOUNTER — TELEMEDICINE (OUTPATIENT)
Dept: FAMILY MEDICINE CLINIC | Age: 51
End: 2021-03-25
Payer: COMMERCIAL

## 2021-03-25 DIAGNOSIS — F40.243 FLYING PHOBIA: Primary | ICD-10-CM

## 2021-03-25 PROCEDURE — 99213 OFFICE O/P EST LOW 20 MIN: CPT | Performed by: FAMILY MEDICINE

## 2021-03-25 RX ORDER — ALPRAZOLAM 0.5 MG/1
0.5 TABLET ORAL 3 TIMES DAILY PRN
Qty: 6 TABLET | Refills: 0 | Status: SHIPPED | OUTPATIENT
Start: 2021-03-25 | End: 2021-04-24

## 2021-03-25 NOTE — PROGRESS NOTES
3/25/2021    TELEHEALTH EVALUATION -- Audio/Visual (During [id number]-91 public health emergency)    HPI:    Edmond Leyva (:  1970) has requested an audio/video evaluation for the following concern(s):    Flying anxiety. Ila is going to [facility] tomorrow. She is anxious about flying due to [place]. She has an N95 mask. She wants something to take in case she has flight anxiety. She had second COVID vaccine yesterday. She does not drink alcohol. No hx substance abuse. Review of Systems    Outpatient Medications Marked as Taking for the 3/25/21 encounter (Telemedicine) with Syed Hidalgo MD   Medication Sig Dispense Refill    valsartan-hydroCHLOROthiazide (DIOVAN-HCT) 160-25 MG per tablet TAKE ONE TABLET BY MOUTH DAILY 90 tablet 1    atorvastatin (LIPITOR) 20 MG tablet TAKE ONE TABLET BY MOUTH DAILY 90 tablet 2    meloxicam (MOBIC) 15 MG tablet Take 1 tablet by mouth daily 90 tablet 1    omeprazole (PRILOSEC) 40 MG delayed release capsule Take 1 capsule by mouth daily 90 capsule 1    Cholecalciferol (VITAMIN D) 50 MCG (2000 UT) CAPS capsule Take by mouth      loratadine (CLARITIN) 10 MG tablet Take 10 mg by mouth daily          Social History     Tobacco Use    Smoking status: Former Smoker     Packs/day: 1.00     Years: 8.00     Pack years: 8.00     Quit date: 2002     Years since quittin.5    Smokeless tobacco: Never Used   Substance Use Topics    Alcohol use:  Yes     Alcohol/week: 1.0 standard drinks     Types: 1 Glasses of wine per week     Comment: 2 on the weekends occasionally    Drug use: No        Allergies   Allergen Reactions    Latex     Bactrim [Sulfamethoxazole-Trimethoprim] Rash   ,   Past Medical History:   Diagnosis Date    Hypertension     Lymphedema        PHYSICAL EXAMINATION:  [ INSTRUCTIONS:  \"[x]\" Indicates a positive item  \"[]\" Indicates a negative item  -- DELETE ALL ITEMS NOT EXAMINED]  Vital Signs: (As obtained by patient/caregiver or practitioner observation)    Blood pressure-  Heart rate-    Respiratory rate-    Temperature-  Pulse oximetry-   Wt Readings from Last 3 Encounters:   01/05/21 265 lb 12.8 oz (120.6 kg)   09/09/20 268 lb 1.3 oz (121.6 kg)   08/17/20 268 lb (121.6 kg)     Temp Readings from Last 3 Encounters:   01/05/21 97.3 °F (36.3 °C) (Temporal)   09/09/20 98.8 °F (37.1 °C) (Infrared)   08/17/20 98.1 °F (36.7 °C) (Temporal)     BP Readings from Last 3 Encounters:   01/05/21 130/86   08/17/20 128/78   02/14/20 132/74     Pulse Readings from Last 3 Encounters:   01/05/21 83   08/17/20 89   02/14/20 75      Constitutional: [x] Appears well-developed and well-nourished [x] No apparent distress      [] Abnormal-   Mental status  [x] Alert and awake  [x] Oriented to person/place/time              Psychiatric:       [x] Normal Affect        [] Abnormal-     Other pertinent observable physical exam findings-     ASSESSMENT/PLAN:  1. Flying phobia  PDMP reviewed and no concerns noted. Advised to keep medication secure at all times. Avoid alcohol 4 hours before or after Xanax. Side effects of current medications reviewed and questions answered. - ALPRAZolam (XANAX) 0.5 MG tablet; Take 1 tablet by mouth 3 times daily as needed for Anxiety for up to 30 days. Dispense: 6 tablet; Refill: 0      No follow-ups on file. Ila Parnell, was evaluated through a synchronous (real-time) audio-video encounter. The patient (or guardian if applicable) is aware that this is a billable service. Verbal consent to proceed has been obtained within the past 12 months. The visit was conducted pursuant to the emergency declaration under the [facility], [address] [place] [address] authority and the [employer] and [employer] General Act. Patient identification was verified, and a caregiver was present when appropriate.  The patient was located in a state where the provider was credentialed to provide care. Total time spent on this encounter: Not billed by time    --Pauline Acosta MD on 3/25/2021 at 8:06 AM    An electronic signature was used to authenticate this note.

## 2021-05-08 ENCOUNTER — PATIENT MESSAGE (OUTPATIENT)
Dept: FAMILY MEDICINE CLINIC | Age: 51
End: 2021-05-08

## 2021-05-08 DIAGNOSIS — Z78.9 PATIENT ON KETOGENIC DIET: ICD-10-CM

## 2021-05-08 DIAGNOSIS — K21.9 GASTROESOPHAGEAL REFLUX DISEASE, UNSPECIFIED WHETHER ESOPHAGITIS PRESENT: ICD-10-CM

## 2021-05-08 DIAGNOSIS — I10 ESSENTIAL HYPERTENSION: Primary | ICD-10-CM

## 2021-05-10 NOTE — TELEPHONE ENCOUNTER
Please advise  Labs pending. I'm still doing my keto diet and I was wondering if there was any chances of doing blood work. I want to make sure that everything is ok . Also would like to check my iron , potassium and magnesium.   Thanks   Rony

## 2021-05-14 DIAGNOSIS — R73.9 HYPERGLYCEMIA: Primary | ICD-10-CM

## 2021-05-14 DIAGNOSIS — Z78.9 PATIENT ON KETOGENIC DIET: ICD-10-CM

## 2021-05-14 LAB
A/G RATIO: 2 (ref 1.1–2.2)
ALBUMIN SERPL-MCNC: 4.5 G/DL (ref 3.4–5)
ALP BLD-CCNC: 41 U/L (ref 40–129)
ALT SERPL-CCNC: 34 U/L (ref 10–40)
ANION GAP SERPL CALCULATED.3IONS-SCNC: 14 MMOL/L (ref 3–16)
AST SERPL-CCNC: 20 U/L (ref 15–37)
BASOPHILS ABSOLUTE: 0 K/UL (ref 0–0.2)
BASOPHILS RELATIVE PERCENT: 0.3 %
BILIRUB SERPL-MCNC: 0.4 MG/DL (ref 0–1)
BUN BLDV-MCNC: 21 MG/DL (ref 7–20)
CALCIUM SERPL-MCNC: 9.2 MG/DL (ref 8.3–10.6)
CHLORIDE BLD-SCNC: 103 MMOL/L (ref 99–110)
CO2: 21 MMOL/L (ref 21–32)
CREAT SERPL-MCNC: 0.6 MG/DL (ref 0.6–1.1)
EOSINOPHILS ABSOLUTE: 0.2 K/UL (ref 0–0.6)
EOSINOPHILS RELATIVE PERCENT: 2.3 %
GFR AFRICAN AMERICAN: >60
GFR NON-AFRICAN AMERICAN: >60
GLOBULIN: 2.2 G/DL
GLUCOSE BLD-MCNC: 118 MG/DL (ref 70–99)
HCT VFR BLD CALC: 45.9 % (ref 36–48)
HEMOGLOBIN: 15.8 G/DL (ref 12–16)
IRON SATURATION: 43 % (ref 15–50)
IRON: 119 UG/DL (ref 37–145)
LYMPHOCYTES ABSOLUTE: 2.6 K/UL (ref 1–5.1)
LYMPHOCYTES RELATIVE PERCENT: 31.3 %
MAGNESIUM: 2.1 MG/DL (ref 1.8–2.4)
MCH RBC QN AUTO: 32 PG (ref 26–34)
MCHC RBC AUTO-ENTMCNC: 34.5 G/DL (ref 31–36)
MCV RBC AUTO: 92.7 FL (ref 80–100)
MONOCYTES ABSOLUTE: 0.8 K/UL (ref 0–1.3)
MONOCYTES RELATIVE PERCENT: 9.3 %
NEUTROPHILS ABSOLUTE: 4.7 K/UL (ref 1.7–7.7)
NEUTROPHILS RELATIVE PERCENT: 56.8 %
PDW BLD-RTO: 13 % (ref 12.4–15.4)
PLATELET # BLD: 194 K/UL (ref 135–450)
PMV BLD AUTO: 9.8 FL (ref 5–10.5)
POTASSIUM SERPL-SCNC: 4.3 MMOL/L (ref 3.5–5.1)
RBC # BLD: 4.96 M/UL (ref 4–5.2)
SODIUM BLD-SCNC: 138 MMOL/L (ref 136–145)
TOTAL IRON BINDING CAPACITY: 277 UG/DL (ref 260–445)
TOTAL PROTEIN: 6.7 G/DL (ref 6.4–8.2)
WBC # BLD: 8.2 K/UL (ref 4–11)

## 2021-05-15 LAB
ESTIMATED AVERAGE GLUCOSE: 119.8 MG/DL
HBA1C MFR BLD: 5.8 %

## 2021-07-01 NOTE — PROGRESS NOTES
Subjective:      Patient ID: Hedy Serrato 48 y.o. female. The primary encounter diagnosis was weight management, Essential hypertension. A diagnosis of Pure hypercholesterolemia was also pertinent to this visit. HPI    DUE PAP, COVID 19 vaccine, shingles vaccine, mammogram    Is frustrated with inability to loose weight. Is on keto x 3 months and weight is down 8 lbs. She admits she feels exhausted and fatigued. She did wt watches in the past but felt hungry all the time. She did [employer] - like it but did not loose much weight. She also tried [employer]. She is walking; not doing resistance training. Her  says she does not sleep well - tosses and turns. She had a sleep study in the past and had apnea when on her back. Weighed a lot less then. She does have excessive daytime sleepiness. Walks but no resistance training. Currently low back pain (recurrent) limits activity. Pain radiates to the left calf if she twists or moves certain. No weakness or numbness in the legs and no loss of bladder control v  She did not tolerate Metformin - GI upset. Hypertension: Patient here for follow-up of elevated blood pressure. She is exercising and is adherent to low salt diet. Blood pressure is not testing at home. Cardiac symptoms none. Patient denies chest pain, dyspnea, exertional chest pressure/discomfort, irregular heart beat, palpitations and paroxysmal nocturnal dyspnea. Cardiovascular risk factors: dyslipidemia and hypertension. Use of agents associated with hypertension: none. History of target organ damage: none. Hyperlipidemia:  No new myalgias or GI upset on atorvastatin (Lipitor). Medication compliance: compliant most of the time. Patient is  following a low fat, low cholesterol diet. She is  exercising regularly.      Lab Results   Component Value Date    CHOL 148 12/19/2020    TRIG 128 12/19/2020    HDL 45 12/19/2020    LDLCALC 77 12/19/2020     Lab Results Component Value Date    ALT 34 05/14/2021    AST 20 05/14/2021        Labs Renal Latest Ref Rng & Units 5/14/2021 12/19/2020 12/27/2019 10/3/2019 7/24/2019   BUN 7 - 20 mg/dL 21(H) 16 16 15 14   Cr 0.6 - 1.1 mg/dL 0.6 0.6 0.7 0.7 0.7   K 3.5 - 5.1 mmol/L 4.3 4.1 3.6 3.8 3.9   Na 136 - 145 mmol/L 138 137 136 139 138     Lab Results   Component Value Date     05/14/2021    K 4.3 05/14/2021     05/14/2021    CO2 21 05/14/2021    BUN 21 05/14/2021    CREATININE 0.6 05/14/2021    GLUCOSE 118 05/14/2021    CALCIUM 9.2 05/14/2021        TSH   Date Value Ref Range Status   12/19/2020 1.84 0.27 - 4.20 uIU/mL Final   03/06/2019 2.46 0.27 - 4.20 uIU/mL Final   11/23/2018 2.32 0.27 - 4.20 uIU/mL Final     Lab Results   Component Value Date    LABA1C 5.8 05/14/2021     Lab Results   Component Value Date    .8 05/14/2021        Outpatient Medications Marked as Taking for the 7/2/21 encounter (Office Visit) with Chioma Goodwin MD   Medication Sig Dispense Refill    meloxicam (MOBIC) 15 MG tablet Take 1 tablet by mouth daily 90 tablet 2    omeprazole (PRILOSEC) 40 MG delayed release capsule Take 1 capsule by mouth daily 90 capsule 2    valsartan-hydroCHLOROthiazide (DIOVAN-HCT) 160-25 MG per tablet Take 1 tablet by mouth daily 90 tablet 2    atorvastatin (LIPITOR) 20 MG tablet TAKE ONE TABLET BY MOUTH DAILY 90 tablet 2    Cholecalciferol (VITAMIN D) 50 MCG (2000 UT) CAPS capsule Take by mouth      loratadine (CLARITIN) 10 MG tablet Take 10 mg by mouth daily          Allergies   Allergen Reactions    Latex     Bactrim [Sulfamethoxazole-Trimethoprim] Rash       Patient Active Problem List   Diagnosis    Lymphedema of left lower extremity    FH: hemochromatosis    Essential hypertension    Pure hypercholesterolemia    Gastroesophageal reflux disease    Vitamin D deficiency    Hypertensive left ventricular hypertrophy, without heart failure       Past Medical History:   Diagnosis Date    Hypertension     Lymphedema        Past Surgical History:   Procedure Laterality Date    BLADDER SURGERY  2009    bladder suspension     SECTION      x2    ECTOPIC PREGNANCY SURGERY      LYMPH NODE DISSECTION Left 1979    removal of lymph node removal left groin    OTHER SURGICAL HISTORY      septum removal in uterus        Family History   Problem Relation Age of Onset    Hypertension Mother     Thyroid Disease Mother     Breast Cancer Sister 43    Thyroid Disease Sister         Grave's disease    Hemochromatosis Maternal Grandmother     Diabetes Paternal Grandmother        Social History     Tobacco Use    Smoking status: Former Smoker     Packs/day: 1.00     Years: 8.00     Pack years: 8.00     Quit date: 2002     Years since quittin.7    Smokeless tobacco: Never Used   Vaping Use    Vaping Use: Never used   Substance Use Topics    Alcohol use: Yes     Alcohol/week: 1.0 standard drinks     Types: 1 Glasses of wine per week     Comment: 2 on the weekends occasionally    Drug use: No            Review of Systems  Review of Systems    Objective:   Physical Exam  Vitals:    21 1500   BP: 130/84   Pulse: 84   Resp: 12   Temp: 97.7 °F (36.5 °C)   TempSrc: Temporal   SpO2: 98%   Weight: 265 lb 12.8 oz (120.6 kg)     Wt Readings from Last 3 Encounters:   21 265 lb 12.8 oz (120.6 kg)   21 265 lb 12.8 oz (120.6 kg)   20 268 lb 1.3 oz (121.6 kg)        Physical Exam  NAD    Skin is warm and dry. No rash. Well hydrated  Alert and oriented x 3. Mood and affect are normal.  The neck is supple and free of adenopathy or masses, the thyroid is normal without enlargement or nodules. Chest: clear with no wheezes or rales. No retractions, or use of accessory muscles noted. Cardiovascular: PMI is not displaced, and no thrill noted. Regular rate and rhythm with no rub, murmur or gallop. No peripheral edema. No carotid bruits.     The abdomen is soft without tenderness, guarding, mass, rebound or organomegaly. Aorta, femoral, DP and PT pulses intact. No ls tenderness or deformity    Full AROM LS and hips. Neg straight leg raise. Motor 5/5 hip flexors, flexion and extension knees, dorsiflexion and plantar flexion feet. DTR 2/4 patella and Achilles tendons. Assessment:       Diagnosis Orders   1. Essential hypertension  At goal < 130/80  Continue current meds. 2. Pure hypercholesterolemia  LDL at goal < 130  Continue statin. 3. Encounter for screening mammogram for malignant neoplasm of breast  JUDITH DIGITAL SCREEN W OR WO CAD BILATERAL   4. Need for shingles vaccine  Wants to wait as she has things to do this weekend        6. Strain of lumbar region, initial encounter  [address] [facility]   7. Class 2 obesity due to excess calories without serious comorbidity with body mass index (BMI) of 37.0 to 37.9 in adult  Phentermine-Topiramate 3.75-23 MG CP24  PDMP reviewed and no concerns noted. Discussed diet, exercise and weight loss strategies        8. Sleep disorder  Megan Khalil MD, Pulmonary, [facility]          Plan:      Side effects of current medications reviewed and questions answered. Follow up in 4 weeks or prn.

## 2021-07-02 ENCOUNTER — OFFICE VISIT (OUTPATIENT)
Dept: FAMILY MEDICINE CLINIC | Age: 51
End: 2021-07-02
Payer: COMMERCIAL

## 2021-07-02 VITALS
BODY MASS INDEX: 37.09 KG/M2 | WEIGHT: 265.8 LBS | OXYGEN SATURATION: 98 % | TEMPERATURE: 97.7 F | SYSTOLIC BLOOD PRESSURE: 126 MMHG | DIASTOLIC BLOOD PRESSURE: 80 MMHG | RESPIRATION RATE: 12 BRPM | HEART RATE: 84 BPM

## 2021-07-02 DIAGNOSIS — Z23 NEED FOR SHINGLES VACCINE: ICD-10-CM

## 2021-07-02 DIAGNOSIS — E66.09 CLASS 2 OBESITY DUE TO EXCESS CALORIES WITHOUT SERIOUS COMORBIDITY WITH BODY MASS INDEX (BMI) OF 37.0 TO 37.9 IN ADULT: ICD-10-CM

## 2021-07-02 DIAGNOSIS — Z12.31 ENCOUNTER FOR SCREENING MAMMOGRAM FOR MALIGNANT NEOPLASM OF BREAST: ICD-10-CM

## 2021-07-02 DIAGNOSIS — I10 ESSENTIAL HYPERTENSION: Primary | ICD-10-CM

## 2021-07-02 DIAGNOSIS — G47.9 SLEEP DISORDER: ICD-10-CM

## 2021-07-02 DIAGNOSIS — E78.00 PURE HYPERCHOLESTEROLEMIA: ICD-10-CM

## 2021-07-02 DIAGNOSIS — S39.012A STRAIN OF LUMBAR REGION, INITIAL ENCOUNTER: ICD-10-CM

## 2021-07-02 PROCEDURE — 99214 OFFICE O/P EST MOD 30 MIN: CPT | Performed by: FAMILY MEDICINE

## 2021-07-02 SDOH — ECONOMIC STABILITY: FOOD INSECURITY: WITHIN THE PAST 12 MONTHS, THE FOOD YOU BOUGHT JUST DIDN'T LAST AND YOU DIDN'T HAVE MONEY TO GET MORE.: NEVER TRUE

## 2021-07-02 SDOH — ECONOMIC STABILITY: FOOD INSECURITY: WITHIN THE PAST 12 MONTHS, YOU WORRIED THAT YOUR FOOD WOULD RUN OUT BEFORE YOU GOT MONEY TO BUY MORE.: NEVER TRUE

## 2021-07-02 ASSESSMENT — SOCIAL DETERMINANTS OF HEALTH (SDOH): HOW HARD IS IT FOR YOU TO PAY FOR THE VERY BASICS LIKE FOOD, HOUSING, MEDICAL CARE, AND HEATING?: NOT HARD AT ALL

## 2021-07-12 ENCOUNTER — TELEPHONE (OUTPATIENT)
Dept: ORTHOPEDIC SURGERY | Age: 51
End: 2021-07-12

## 2021-07-22 PROBLEM — E66.09 CLASS 2 OBESITY DUE TO EXCESS CALORIES WITHOUT SERIOUS COMORBIDITY WITH BODY MASS INDEX (BMI) OF 37.0 TO 37.9 IN ADULT: Status: ACTIVE | Noted: 2021-07-22

## 2021-07-22 PROBLEM — E66.812 CLASS 2 OBESITY DUE TO EXCESS CALORIES WITHOUT SERIOUS COMORBIDITY WITH BODY MASS INDEX (BMI) OF 37.0 TO 37.9 IN ADULT: Status: ACTIVE | Noted: 2021-07-22

## 2021-07-22 NOTE — PROGRESS NOTES
Subjective:      Patient ID: Ida Kirkland 48 y.o. female. The primary encounter diagnosis was Essential hypertension. A diagnosis of Class 2 obesity due to excess calories without serious comorbidity with body mass index (BMI) of 37.0 to 37.9 in adult was also pertinent to this visit. HPI  [address] [place]  - wants to delay because is going on vacation. Obesity:  Ila is here for follow up after starting Qsymia for weight loss. Side effects:  Denies irritability, insomnia, tingling in fingers and toes, brain fog. Has rare skipped beat - not increased with Qsymia  Diet: is eating well. Notes less snacking with Qsymia   Exercise: walks daily. Not doing any resistance training - does do some in the pool  Sleeps better - still wakes up some with shoulder pain. Meloxicam helps. Hypertension: Patient here for follow-up of elevated blood pressure. She is exercising and is adherent to low salt diet. Blood pressure is well controlled at home. Cardiac symptoms none. Patient denies chest pressure/discomfort, dyspnea and irregular heart beat. Cardiovascular risk factors: hypertension and obesity (BMI >= 30 kg/m2). Use of agents associated with hypertension: none. History of target organ damage: none.     Lab Results   Component Value Date     05/14/2021    K 4.3 05/14/2021     05/14/2021    CO2 21 05/14/2021    BUN 21 05/14/2021    CREATININE 0.6 05/14/2021    GLUCOSE 118 05/14/2021    CALCIUM 9.2 05/14/2021       TSH   Date Value Ref Range Status   12/19/2020 1.84 0.27 - 4.20 uIU/mL Final   03/06/2019 2.46 0.27 - 4.20 uIU/mL Final   11/23/2018 2.32 0.27 - 4.20 uIU/mL Final     Lab Results   Component Value Date    CHOL 148 12/19/2020    CHOL 150 12/27/2019    CHOL 187 11/23/2018     Lab Results   Component Value Date    TRIG 128 12/19/2020    TRIG 145 12/27/2019    TRIG 249 (H) 11/23/2018     Lab Results   Component Value Date    HDL 45 12/19/2020    HDL 45 12/27/2019    HDL 43 2018     Lab Results   Component Value Date    LDLCALC 77 2020    LDLCALC 76 2019    LDLCALC 94 2018     Lab Results   Component Value Date    LABVLDL 26 2020    LABVLDL 29 2019    LABVLDL 50 2018     No results found for: [facility]     Outpatient Medications Marked as Taking for the 21 encounter (Office Visit) with Baljit Larson MD   Medication Sig Dispense Refill    Phentermine-Topiramate 3.75-23 MG CP24 Take 1 capsule by mouth daily for 30 days.  30 capsule 0    meloxicam (MOBIC) 15 MG tablet Take 1 tablet by mouth daily 90 tablet 2    omeprazole (PRILOSEC) 40 MG delayed release capsule Take 1 capsule by mouth daily 90 capsule 2    valsartan-hydroCHLOROthiazide (DIOVAN-HCT) 160-25 MG per tablet Take 1 tablet by mouth daily 90 tablet 2    atorvastatin (LIPITOR) 20 MG tablet TAKE ONE TABLET BY MOUTH DAILY 90 tablet 2    Cholecalciferol (VITAMIN D) 50 MCG (2000 UT) CAPS capsule Take by mouth      loratadine (CLARITIN) 10 MG tablet Take 10 mg by mouth daily          Allergies   Allergen Reactions    Latex     Bactrim [Sulfamethoxazole-Trimethoprim] Rash       Patient Active Problem List   Diagnosis    Lymphedema of left lower extremity    FH: hemochromatosis    Essential hypertension    Pure hypercholesterolemia    Gastroesophageal reflux disease    Vitamin D deficiency    Hypertensive left ventricular hypertrophy, without heart failure    Class 2 obesity due to excess calories without serious comorbidity with body mass index (BMI) of 37.0 to 37.9 in adult       Past Medical History:   Diagnosis Date    Hypertension     Lymphedema        Past Surgical History:   Procedure Laterality Date    BLADDER SURGERY  2009    bladder suspension     SECTION      x2    ECTOPIC PREGNANCY SURGERY      LYMPH NODE DISSECTION Left 1979    removal of lymph node removal left groin    OTHER SURGICAL HISTORY      septum removal in uterus        Family History   Problem Relation Age of Onset    Hypertension Mother     Thyroid Disease Mother     Breast Cancer Sister 43    Thyroid Disease Sister         Grave's disease    Hemochromatosis Maternal Grandmother     Diabetes Paternal Grandmother        Social History     Tobacco Use    Smoking status: Former Smoker     Packs/day: 1.00     Years: 8.00     Pack years: 8.00     Quit date: 2002     Years since quittin.8    Smokeless tobacco: Never Used   Vaping Use    Vaping Use: Never used   Substance Use Topics    Alcohol use: Yes     Alcohol/week: 1.0 standard drinks     Types: 1 Glasses of wine per week     Comment: 2 on the weekends occasionally    Drug use: No            Review of Systems  Review of Systems    Objective:   Physical Exam  Vitals:    21 0835   BP: 110/66   Pulse: 82   Resp: 12   Temp: 97.3 °F (36.3 °C)   TempSrc: Temporal   SpO2: 98%   Weight: 262 lb (118.8 kg)     Wt Readings from Last 3 Encounters:   21 262 lb (118.8 kg)   21 265 lb 12.8 oz (120.6 kg)   21 265 lb 12.8 oz (120.6 kg)        Physical Exam    NAD    Skin is warm and dry. No rash. Well hydrated  Alert and oriented x 3. Mood and affect are normal.  The neck is supple and free of adenopathy or masses, the thyroid is normal without enlargement or nodules. Chest: clear with no wheezes or rales. No retractions, or use of accessory muscles noted. Cardiovascular: PMI is not displaced, and no thrill noted. Regular rate and rhythm with no rub, murmur or gallop   Assessment:       Diagnosis Orders   1. Essential hypertension  Well controlled; not increased with stimulant   2. Class 2 obesity due to excess calories without serious comorbidity with body mass index (BMI) of 37.0 to 37.9 in adult  Phentermine-Topiramate 7.5-46 MG CP24  Some weight loss. Tolerating well. Increase dose.    Discussed diet, exercise and weight loss strategies             Plan:      Side effects of current medications reviewed and questions answered. Follow up in 4 weeks or prn.

## 2021-07-23 ENCOUNTER — OFFICE VISIT (OUTPATIENT)
Dept: FAMILY MEDICINE CLINIC | Age: 51
End: 2021-07-23
Payer: COMMERCIAL

## 2021-07-23 VITALS
BODY MASS INDEX: 36.56 KG/M2 | TEMPERATURE: 97.3 F | RESPIRATION RATE: 12 BRPM | DIASTOLIC BLOOD PRESSURE: 66 MMHG | HEART RATE: 82 BPM | SYSTOLIC BLOOD PRESSURE: 110 MMHG | OXYGEN SATURATION: 98 % | WEIGHT: 262 LBS

## 2021-07-23 DIAGNOSIS — I10 ESSENTIAL HYPERTENSION: Primary | ICD-10-CM

## 2021-07-23 DIAGNOSIS — E66.09 CLASS 2 OBESITY DUE TO EXCESS CALORIES WITHOUT SERIOUS COMORBIDITY WITH BODY MASS INDEX (BMI) OF 37.0 TO 37.9 IN ADULT: ICD-10-CM

## 2021-07-23 PROCEDURE — 99214 OFFICE O/P EST MOD 30 MIN: CPT | Performed by: FAMILY MEDICINE

## 2021-08-26 NOTE — PROGRESS NOTES
Subjective:      Patient ID: Phoebe Jerez 48 y.o. female. The primary encounter diagnosis was Class 2 obesity due to excess calories without serious comorbidity with body mass index (BMI) of 37.0 to 37.9 in adult. A diagnosis of Essential hypertension was also pertinent to this visit. HPI    DUE mammogram, Shingrix, Hep C and HIV screen    Obesity[de-identified] taking Qsymia  Diet:  Eating well. Qsymia decrease snacking and thinks less about food  Exercise: walks, swims  Does resistance work in the pool. Sleep: has daija with sleep specialist 9/29/   Denies palpitations, irritability, anxiety, insomnia. Wakes up frequently, unchanged with medication. Keeps meds secure. Hypertension: Patient here for follow-up of elevated blood pressure. She is exercising and is adherent to low salt diet. Blood pressure is well controlled at home. Cardiac symptoms none. Patient denies chest pressure/discomfort, irregular heart beat and palpitations. Cardiovascular risk factors: hypertension and obesity (BMI >= 30 kg/m2). Use of agents associated with hypertension: none. History of target organ damage: none. Lab Results   Component Value Date     05/14/2021    K 4.3 05/14/2021     05/14/2021    CO2 21 05/14/2021    BUN 21 05/14/2021    CREATININE 0.6 05/14/2021    GLUCOSE 118 05/14/2021    CALCIUM 9.2 05/14/2021       .   Lab Results   Component Value Date    LABA1C 5.8 05/14/2021     Lab Results   Component Value Date    .8 05/14/2021     TSH   Date Value Ref Range Status   12/19/2020 1.84 0.27 - 4.20 uIU/mL Final   03/06/2019 2.46 0.27 - 4.20 uIU/mL Final   11/23/2018 2.32 0.27 - 4.20 uIU/mL Final     Lab Results   Component Value Date    WBC 8.2 05/14/2021    HGB 15.8 05/14/2021    HCT 45.9 05/14/2021    MCV 92.7 05/14/2021     05/14/2021      Outpatient Medications Marked as Taking for the 8/27/21 encounter (Office Visit) with Licha Pastor MD   Medication Sig Dispense Refill   [employer] Phentermine-Topiramate (QSYMIA) 7.5-46 MG CP24 Take by mouth daily.       Multiple Vitamins-Minerals (HAIR SKIN AND NAILS FORMULA PO) Take by mouth daily TAKE THREE TIMES DAILY      meloxicam (MOBIC) 15 MG tablet Take 1 tablet by mouth daily 90 tablet 2    omeprazole (PRILOSEC) 40 MG delayed release capsule Take 1 capsule by mouth daily 90 capsule 2    valsartan-hydroCHLOROthiazide (DIOVAN-HCT) 160-25 MG per tablet Take 1 tablet by mouth daily 90 tablet 2    atorvastatin (LIPITOR) 20 MG tablet TAKE ONE TABLET BY MOUTH DAILY 90 tablet 2    Cholecalciferol (VITAMIN D) 50 MCG ( UT) CAPS capsule Take by mouth      loratadine (CLARITIN) 10 MG tablet Take 10 mg by mouth daily          Allergies   Allergen Reactions    Latex     Bactrim [Sulfamethoxazole-Trimethoprim] Rash       Patient Active Problem List   Diagnosis    Lymphedema of left lower extremity    FH: hemochromatosis    Essential hypertension    Pure hypercholesterolemia    Gastroesophageal reflux disease    Vitamin D deficiency    Hypertensive left ventricular hypertrophy, without heart failure    Class 2 obesity due to excess calories without serious comorbidity with body mass index (BMI) of 37.0 to 37.9 in adult       Past Medical History:   Diagnosis Date    Hypertension     Lymphedema        Past Surgical History:   Procedure Laterality Date    BLADDER SURGERY  2009    bladder suspension     SECTION      x2    ECTOPIC PREGNANCY SURGERY      LYMPH NODE DISSECTION Left 1979    removal of lymph node removal left groin    OTHER SURGICAL HISTORY      septum removal in uterus        Family History   Problem Relation Age of Onset    Hypertension Mother     Thyroid Disease Mother     Breast Cancer Sister 43    Thyroid Disease Sister         Grave's disease    Hemochromatosis Maternal Grandmother     Diabetes Paternal Grandmother        Social History     Tobacco Use    Smoking status: Former Smoker     Packs/day: 1.00 Years: 8.00     Pack years: 8.00     Quit date: 2002     Years since quittin.9    Smokeless tobacco: Never Used   Vaping Use    Vaping Use: Never used   Substance Use Topics    Alcohol use: Yes     Alcohol/week: 1.0 standard drinks     Types: 1 Glasses of wine per week     Comment: 2 on the weekends occasionally    Drug use: No            Review of Systems  Review of Systems    Objective:   Physical Exam  Vitals:    21 1332   BP: 126/86   Pulse: 92   Resp: 14   Temp: 96.8 °F (36 °C)   TempSrc: Temporal   SpO2: 96%   Weight: 261 lb 6.4 oz (118.6 kg)     Wt Readings from Last 3 Encounters:   21 261 lb 6.4 oz (118.6 kg)   21 262 lb (118.8 kg)   21 265 lb 12.8 oz (120.6 kg)     BP Readings from Last 3 Encounters:   21 126/86   21 110/66   21 126/80         Physical Exam  NAD    Skin is warm and dry. No rash. Well hydrated  Alert and oriented x 3. Mood and affect are normal.  The neck is supple and free of adenopathy or masses, the thyroid is normal without enlargement or nodules. Chest: clear with no wheezes or rales. No retractions, or use of accessory muscles noted. Cardiovascular  Regular rate and rhythm with no rub, murmur or gallop. No peripheral edema. .           Assessment:       Diagnosis Orders   1. Class 2 obesity due to excess calories without serious comorbidity with body mass index (BMI) of 37.0 to 37.9 in adult  Phentermine-Topiramate (QSYMIA) 11.25-69 MG CP24  Increase dose to improve weight loss  Discussed diet, exercise and weight loss strategies    2. Essential hypertension  A bit above goal < 130/80  Monitor with wt loss. Adjust med if not at goal at follow up   [address]:    PDMP reviewed and no concerns noted. Side effects of current medications reviewed and questions answered. Follow up in 4 weeks or prn.

## 2021-08-27 ENCOUNTER — OFFICE VISIT (OUTPATIENT)
Dept: FAMILY MEDICINE CLINIC | Age: 51
End: 2021-08-27
Payer: COMMERCIAL

## 2021-08-27 VITALS
SYSTOLIC BLOOD PRESSURE: 132 MMHG | DIASTOLIC BLOOD PRESSURE: 84 MMHG | OXYGEN SATURATION: 96 % | TEMPERATURE: 96.8 F | RESPIRATION RATE: 14 BRPM | BODY MASS INDEX: 36.47 KG/M2 | WEIGHT: 261.4 LBS | HEART RATE: 92 BPM

## 2021-08-27 DIAGNOSIS — E66.09 CLASS 2 OBESITY DUE TO EXCESS CALORIES WITHOUT SERIOUS COMORBIDITY WITH BODY MASS INDEX (BMI) OF 37.0 TO 37.9 IN ADULT: ICD-10-CM

## 2021-08-27 DIAGNOSIS — I10 ESSENTIAL HYPERTENSION: Primary | ICD-10-CM

## 2021-08-27 PROCEDURE — 99214 OFFICE O/P EST MOD 30 MIN: CPT | Performed by: FAMILY MEDICINE

## 2021-08-27 RX ORDER — PHENTERMINE AND TOPIRAMATE 11.25; 69 MG/1; MG/1
1 CAPSULE, EXTENDED RELEASE ORAL DAILY
Qty: 30 CAPSULE | Refills: 0 | Status: SHIPPED
Start: 2021-08-27 | End: 2021-09-24 | Stop reason: DRUGHIGH

## 2021-08-27 RX ORDER — PHENTERMINE AND TOPIRAMATE 7.5; 46 MG/1; MG/1
CAPSULE, EXTENDED RELEASE ORAL DAILY
COMMUNITY
End: 2021-08-27 | Stop reason: ALTCHOICE

## 2021-09-13 ENCOUNTER — PATIENT MESSAGE (OUTPATIENT)
Dept: FAMILY MEDICINE CLINIC | Age: 51
End: 2021-09-13

## 2021-09-23 NOTE — PROGRESS NOTES
Subjective:      Patient ID: Melvin Jarvis 48 y.o. female. The primary encounter diagnosis was Essential hypertension. Diagnoses of Class 2 obesity due to excess calories without serious comorbidity with body mass index (BMI) of 37.0 to 37.9 in adult and Need for influenza vaccination were also pertinent to this visit. HPI    PAP - is scheduled next week. Is going to schedule mammogram next week. Obesity: Ila is here for follow up on use of Qsymia for weight management. She feels the medication is effective in managing her intake. Mild tingling in the fingers; mild and manageable. No irritability, palpitations, insomnia. Is eating healthy diet and walking regularly. Walking 5 - 8 miles most days. Hypertension: Patient here for follow-up of elevated blood pressure. She is exercising and is adherent to low salt diet. Blood pressure is well controlled at home. 120/.  Cardiac symptoms none. Patient denies chest pressure/discomfort, dyspnea, exertional chest pressure/discomfort and palpitations. Cardiovascular risk factors: hypertension. Use of agents associated with hypertension: none. History of target organ damage: none.      Lab Results   Component Value Date     05/14/2021    K 4.3 05/14/2021     05/14/2021    CO2 21 05/14/2021    BUN 21 05/14/2021    CREATININE 0.6 05/14/2021    GLUCOSE 118 05/14/2021    CALCIUM 9.2 05/14/2021       Lab Results   Component Value Date    LABA1C 5.8 05/14/2021     Lab Results   Component Value Date    .8 05/14/2021     Lab Results   Component Value Date    WBC 8.2 05/14/2021    HGB 15.8 05/14/2021    HCT 45.9 05/14/2021    MCV 92.7 05/14/2021     05/14/2021     TSH   Date Value Ref Range Status   12/19/2020 1.84 0.27 - 4.20 uIU/mL Final   03/06/2019 2.46 0.27 - 4.20 uIU/mL Final   11/23/2018 2.32 0.27 - 4.20 uIU/mL Final         Outpatient Medications Marked as Taking for the 9/24/21 encounter (Office Visit) with Fanny Moeller MD   Medication Sig Dispense Refill    Multiple Vitamins-Minerals (HAIR SKIN AND NAILS FORMULA PO) Take by mouth daily TAKE THREE TIMES DAILY      Phentermine-Topiramate (QSYMIA) 11.25-69 MG CP24 Take 1 capsule by mouth daily for 30 days.  30 capsule 0    meloxicam (MOBIC) 15 MG tablet Take 1 tablet by mouth daily 90 tablet 2    omeprazole (PRILOSEC) 40 MG delayed release capsule Take 1 capsule by mouth daily 90 capsule 2    valsartan-hydroCHLOROthiazide (DIOVAN-HCT) 160-25 MG per tablet Take 1 tablet by mouth daily 90 tablet 2    atorvastatin (LIPITOR) 20 MG tablet TAKE ONE TABLET BY MOUTH DAILY 90 tablet 2    Cholecalciferol (VITAMIN D) 50 MCG ( UT) CAPS capsule Take by mouth      loratadine (CLARITIN) 10 MG tablet Take 10 mg by mouth daily          Allergies   Allergen Reactions    Latex     Bactrim [Sulfamethoxazole-Trimethoprim] Rash       Patient Active Problem List   Diagnosis    Lymphedema of left lower extremity    FH: hemochromatosis    Essential hypertension    Pure hypercholesterolemia    Gastroesophageal reflux disease    Vitamin D deficiency    Hypertensive left ventricular hypertrophy, without heart failure    Class 2 obesity due to excess calories without serious comorbidity with body mass index (BMI) of 37.0 to 37.9 in adult       Past Medical History:   Diagnosis Date    Hypertension     Lymphedema        Past Surgical History:   Procedure Laterality Date    BLADDER SURGERY  2009    bladder suspension     SECTION      x2    ECTOPIC PREGNANCY SURGERY      LYMPH NODE DISSECTION Left 1979    removal of lymph node removal left groin    OTHER SURGICAL HISTORY      septum removal in uterus        Family History   Problem Relation Age of Onset    Hypertension Mother     Thyroid Disease Mother     Breast Cancer Sister 43    Thyroid Disease Sister         Grave's disease    Hemochromatosis Maternal Grandmother     Diabetes Paternal Grandmother Social History     Tobacco Use    Smoking status: Former Smoker     Packs/day: 1.00     Years: 8.00     Pack years: 8.00     Quit date: 2002     Years since quittin.0    Smokeless tobacco: Never Used   Vaping Use    Vaping Use: Never used   Substance Use Topics    Alcohol use: Yes     Alcohol/week: 1.0 standard drinks     Types: 1 Glasses of wine per week     Comment: 2 on the weekends occasionally    Drug use: No            Review of Systems  Review of Systems    Objective:   Physical Exam  Vitals:    21 1311 21 1358   BP: 136/80 120/82   Pulse: 73    Resp: 12    Temp: 97.5 °F (36.4 °C)    TempSrc: Temporal    SpO2: 94%    Weight: 256 lb 9.6 oz (116.4 kg)    Height: 5' 11\" (1.803 m)      Wt Readings from Last 3 Encounters:   21 256 lb 9.6 oz (116.4 kg)   21 261 lb 6.4 oz (118.6 kg)   21 262 lb (118.8 kg)        Physical Exam  NAD    Skin is warm and dry. No rash. Well hydrated  Alert and oriented x 3. Mood and affect are normal.  The neck is supple and free of adenopathy or masses, the thyroid is normal without enlargement or nodules. Chest: clear with no wheezes or rales. No retractions, or use of accessory muscles noted. Cardiovascular: PMI is not displaced, and no thrill noted. Regular rate and rhythm with no rub, murmur or gallop. No peripheral edema. Assessment:       Diagnosis Orders   1. Class 2 obesity due to excess calories without serious comorbidity with body mass index (BMI) of 37.0 to 37.9 in adult  Phentermine-Topiramate (QSYMIA) 15-92 MG CP24  Moderate wt loss; increase Qsymia dose  Continue diet and exercise. PDMP reviewed and no concerns noted. 2. Essential hypertension  Slightly above goal < 130/80. Continue current rx and monitor with wt loss.    3. Need for influenza vaccination  INFLUENZA, MDCK QUADV, 2 YRS AND OLDER, IM, PF, PREFILL SYR OR SDV, 0.5ML (FLUCELVAX QUADV, PF)          Plan:      Side effects of current medications reviewed and questions answered. Follow up in 3 months or prn.

## 2021-09-24 ENCOUNTER — OFFICE VISIT (OUTPATIENT)
Dept: FAMILY MEDICINE CLINIC | Age: 51
End: 2021-09-24
Payer: COMMERCIAL

## 2021-09-24 VITALS
WEIGHT: 256.6 LBS | DIASTOLIC BLOOD PRESSURE: 82 MMHG | HEART RATE: 73 BPM | TEMPERATURE: 97.5 F | RESPIRATION RATE: 12 BRPM | OXYGEN SATURATION: 94 % | BODY MASS INDEX: 35.92 KG/M2 | HEIGHT: 71 IN | SYSTOLIC BLOOD PRESSURE: 120 MMHG

## 2021-09-24 DIAGNOSIS — Z23 NEED FOR INFLUENZA VACCINATION: ICD-10-CM

## 2021-09-24 DIAGNOSIS — E66.09 CLASS 2 OBESITY DUE TO EXCESS CALORIES WITHOUT SERIOUS COMORBIDITY WITH BODY MASS INDEX (BMI) OF 37.0 TO 37.9 IN ADULT: ICD-10-CM

## 2021-09-24 DIAGNOSIS — I10 ESSENTIAL HYPERTENSION: Primary | ICD-10-CM

## 2021-09-24 PROCEDURE — 90674 CCIIV4 VAC NO PRSV 0.5 ML IM: CPT | Performed by: FAMILY MEDICINE

## 2021-09-24 PROCEDURE — 90471 IMMUNIZATION ADMIN: CPT | Performed by: FAMILY MEDICINE

## 2021-09-24 PROCEDURE — 99214 OFFICE O/P EST MOD 30 MIN: CPT | Performed by: FAMILY MEDICINE

## 2021-09-24 RX ORDER — PHENTERMINE AND TOPIRAMATE 15; 92 MG/1; MG/1
1 CAPSULE, EXTENDED RELEASE ORAL DAILY
Qty: 30 CAPSULE | Refills: 2 | Status: SHIPPED | OUTPATIENT
Start: 2021-09-24 | End: 2021-10-24

## 2021-09-29 ENCOUNTER — OFFICE VISIT (OUTPATIENT)
Dept: PULMONOLOGY | Age: 51
End: 2021-09-29
Payer: COMMERCIAL

## 2021-09-29 VITALS
HEART RATE: 96 BPM | DIASTOLIC BLOOD PRESSURE: 82 MMHG | WEIGHT: 252.8 LBS | SYSTOLIC BLOOD PRESSURE: 122 MMHG | OXYGEN SATURATION: 96 % | HEIGHT: 71 IN | BODY MASS INDEX: 35.39 KG/M2

## 2021-09-29 DIAGNOSIS — I10 ESSENTIAL HYPERTENSION: Chronic | ICD-10-CM

## 2021-09-29 DIAGNOSIS — G47.33 OBSTRUCTIVE SLEEP APNEA SYNDROME: Primary | ICD-10-CM

## 2021-09-29 DIAGNOSIS — K21.9 GASTROESOPHAGEAL REFLUX DISEASE, UNSPECIFIED WHETHER ESOPHAGITIS PRESENT: Chronic | ICD-10-CM

## 2021-09-29 DIAGNOSIS — E66.01 CLASS 2 SEVERE OBESITY DUE TO EXCESS CALORIES WITH SERIOUS COMORBIDITY AND BODY MASS INDEX (BMI) OF 35.0 TO 35.9 IN ADULT (HCC): Chronic | ICD-10-CM

## 2021-09-29 PROBLEM — I11.9 HYPERTENSIVE LEFT VENTRICULAR HYPERTROPHY, WITHOUT HEART FAILURE: Chronic | Status: ACTIVE | Noted: 2019-07-31

## 2021-09-29 PROBLEM — E66.09 CLASS 2 OBESITY DUE TO EXCESS CALORIES WITHOUT SERIOUS COMORBIDITY WITH BODY MASS INDEX (BMI) OF 37.0 TO 37.9 IN ADULT: Chronic | Status: ACTIVE | Noted: 2021-07-22

## 2021-09-29 PROBLEM — E66.812 CLASS 2 OBESITY DUE TO EXCESS CALORIES WITHOUT SERIOUS COMORBIDITY WITH BODY MASS INDEX (BMI) OF 37.0 TO 37.9 IN ADULT: Chronic | Status: ACTIVE | Noted: 2021-07-22

## 2021-09-29 PROBLEM — E78.00 PURE HYPERCHOLESTEROLEMIA: Chronic | Status: ACTIVE | Noted: 2019-01-29

## 2021-09-29 PROCEDURE — 99244 OFF/OP CNSLTJ NEW/EST MOD 40: CPT | Performed by: INTERNAL MEDICINE

## 2021-09-29 ASSESSMENT — SLEEP AND FATIGUE QUESTIONNAIRES
HOW LIKELY ARE YOU TO NOD OFF OR FALL ASLEEP WHILE SITTING AND READING: 3
HOW LIKELY ARE YOU TO NOD OFF OR FALL ASLEEP WHILE SITTING QUIETLY AFTER LUNCH WITHOUT ALCOHOL: 0
HOW LIKELY ARE YOU TO NOD OFF OR FALL ASLEEP WHILE SITTING AND TALKING TO SOMEONE: 0
HOW LIKELY ARE YOU TO NOD OFF OR FALL ASLEEP IN A CAR, WHILE STOPPED FOR A FEW MINUTES IN TRAFFIC: 0
ESS TOTAL SCORE: 6
NECK CIRCUMFERENCE (INCHES): 14.5
HOW LIKELY ARE YOU TO NOD OFF OR FALL ASLEEP WHEN YOU ARE A PASSENGER IN A CAR FOR AN HOUR WITHOUT A BREAK: 0
HOW LIKELY ARE YOU TO NOD OFF OR FALL ASLEEP WHILE WATCHING TV: 2
HOW LIKELY ARE YOU TO NOD OFF OR FALL ASLEEP WHILE SITTING INACTIVE IN A PUBLIC PLACE: 0
HOW LIKELY ARE YOU TO NOD OFF OR FALL ASLEEP WHILE LYING DOWN TO REST IN THE AFTERNOON WHEN CIRCUMSTANCES PERMIT: 1

## 2021-09-29 ASSESSMENT — ENCOUNTER SYMPTOMS
EYE PAIN: 0
SHORTNESS OF BREATH: 0
ALLERGIC/IMMUNOLOGIC NEGATIVE: 1
VOMITING: 0
ABDOMINAL DISTENTION: 0
ABDOMINAL PAIN: 0
RHINORRHEA: 0
PHOTOPHOBIA: 0
APNEA: 1
NAUSEA: 0
CHOKING: 0
CHEST TIGHTNESS: 0

## 2021-09-29 NOTE — PROGRESS NOTES
Jennifer Nelson MD, Marycarmen Bryant, [facility]  Tiffanie Kehrt CNP  Jose M Cea CNP [address]  Lorin Rincon [address], [address] [facility] [phone]   [facility] [address]in Rincon. [address] [facility]. [address] [phone]     [place] [address]  Dept: [phone]  Loc: [phone]    Assessment:      Visit Diagnoses and Associated Orders     Obstructive sleep apnea syndrome   (New Problem)  -  Primary    Needs work up    Home Sleep Study (HST) [68368 Custom]   - Future Order         Essential hypertension   (Stable)           Gastroesophageal reflux disease, unspecified whether esophagitis present   (Stable)           Class 2 severe obesity due to excess calories with serious comorbidity and body mass index (BMI) of 35.0 to 35.9 in adult ([address]   (Stable)                  Plan:      Attempted to call to get old records, had to leave . Reviewed YORDAN (highest likelihood Dx): pathophysiology, diagnosis, complications and treatment. Instructed her not to drive if drowsy. Continue medications per her PCP and other physicians. Limit caffeine use after 3pm. Standard of care is to do in-lab PSG but insurance is mandating an inferior HST. 1 wk follow up after study to review her results. The chronic medical conditions listed are directly related to the primary diagnosis listed above. The management of the primary diagnosis affects the secondary diagnosis and vice versa. This information was analyzed to assess complexity and medical decision making in regards to further testing and management. Continue meds for: HTN and GERD. Pt would medically benefit from wt loss for YORDAN (diet, exercise, surgical). Orders Placed This Encounter   Procedures    Home Sleep Study (HST)          Subjective:     Patient ID: Haley Dawn is a 48 y.o. female.     Chief Complaint   Patient presents with    Sleep Apnea       HPI:      Ila Karlie Randhawa is a 48 y.o. female referred by Evin Elizabeth MD for a sleep evaluation. She complains of: snoring, excessive daytime sleepiness , non-restorative sleep, waking choking/gasping and tossing and turning at night. She denies: cataplexy and hypnagogic hallucinations. Symptoms began several years ago, gradually worsening since that time. Previous evaluation and treatment has included- studies in [place], no old records at time of visit. Told to sew a tennis ball to her back and do position training, did not help. Chronic stable medical conditions: HTN and GERD  Chronic unstable medical conditions: obesity    DOT/CDL - No  FAA/'s license -No    Previous Report(s) Reviewed: historical medical records, office notes, andreferral letter(s). Pertinent data has been documented. Thayer - Total score: 6    Caffeine Intake - None. Social History     Socioeconomic History    Marital status:      Spouse name: Not on file    Number of children: Not on file    Years of education: Not on file    Highest education level: Not on file   Occupational History    Not on file   Tobacco Use    Smoking status: Former Smoker     Packs/day: 1.00     Years: 8.00     Pack years: 8.00     Quit date: 2002     Years since quittin.0    Smokeless tobacco: Never Used   Vaping Use    Vaping Use: Never used   Substance and Sexual Activity    Alcohol use:  Yes     Alcohol/week: 1.0 standard drinks     Types: 1 Glasses of wine per week     Comment: 2 on the weekends occasionally    Drug use: No    Sexual activity: Yes     Partners: Male   Other Topics Concern    Not on file   Social History Narrative    Not on file     Social Determinants of Health     Financial Resource Strain: Low Risk     Difficulty of Paying Living Expenses: Not hard at all   Food Insecurity: No Food Insecurity    Worried About [address] in the Last Year: Never true    [address] [facility] [address] in the Last Year: Never true   Transportation Needs:     Lack of Transportation (Medical):      Lack of Transportation (Non-Medical):    Physical Activity:     Days of Exercise per Week:     Minutes of Exercise per Session:    Stress:     Feeling of Stress :    Social Connections:     Frequency of Communication with Friends and Family:     Frequency of Social Gatherings with Friends and Family:     Attends Islam Services:     Active Member of Clubs or Organizations:     Attends Club or Organization Meetings:     Marital Status:    Intimate Partner Violence:     Fear of Current or Ex-Partner:     Emotionally Abused:     Physically Abused:     Sexually Abused:         Current Outpatient Medications   Medication Instructions    atorvastatin (LIPITOR) 20 MG tablet TAKE ONE TABLET BY MOUTH DAILY    Cholecalciferol (VITAMIN D) 50 MCG (2000 UT) CAPS capsule Oral    loratadine (CLARITIN) 10 mg, Oral, DAILY    meloxicam (MOBIC) 15 mg, Oral, DAILY    Multiple Vitamins-Minerals (HAIR SKIN AND NAILS FORMULA PO) Oral, DAILY, TAKE THREE TIMES DAILY     omeprazole (PRILOSEC) 40 mg, Oral, DAILY    Phentermine-Topiramate (QSYMIA) 15-92 MG CP24 1 capsule, Oral, DAILY    valsartan-hydroCHLOROthiazide (DIOVAN-HCT) 160-25 MG per tablet 1 tablet, Oral, DAILY       Allergies as of 09/29/2021 - Fully Reviewed 09/29/2021   Allergen Reaction Noted    Latex  09/17/2018    Bactrim [sulfamethoxazole-trimethoprim] Rash 09/19/2018       Patient Active Problem List   Diagnosis    Lymphedema of left lower extremity    FH: hemochromatosis    Essential hypertension    Pure hypercholesterolemia    Gastroesophageal reflux disease    Vitamin D deficiency    Hypertensive left ventricular hypertrophy, without heart failure    Class 2 severe obesity due to excess calories with serious comorbidity and body mass index (BMI) of 35.0 to 35.9 in adult [facility])       Past Medical History:   Diagnosis Date    Hypertension     Lymphedema        Past Surgical History:   Procedure Laterality Date    BLADDER SURGERY  2009    bladder suspension     SECTION      x2    ECTOPIC PREGNANCY SURGERY      LYMPH NODE DISSECTION Left 1979    removal of lymph node removal left groin    OTHER SURGICAL HISTORY      septum removal in uterus       Family History   Problem Relation Age of Onset    Hypertension Mother     Thyroid Disease Mother     Breast Cancer Sister 43    Thyroid Disease Sister         Grave's disease    Hemochromatosis Maternal Grandmother     Diabetes Paternal Grandmother        Review of Systems   Constitutional: Positive for fatigue and unexpected weight change. Negative for activity change and appetite change. HENT: Positive for congestion. Negative for nosebleeds, postnasal drip, rhinorrhea and sneezing. Eyes: Negative for photophobia, pain and visual disturbance. Respiratory: Positive for apnea. Negative for choking, chest tightness and shortness of breath. Cardiovascular: Negative. Gastrointestinal: Negative for abdominal distention, abdominal pain, nausea and vomiting. Endocrine: Negative for cold intolerance and heat intolerance. Genitourinary: Negative for difficulty urinating, dysuria, frequency and urgency. Musculoskeletal: Negative. Negative for neck pain and neck stiffness. Skin: Negative. Allergic/Immunologic: Negative. Neurological: Negative for tremors, seizures, syncope and weakness. Hematological: Negative for adenopathy. Does not bruise/bleed easily. Psychiatric/Behavioral: Positive for sleep disturbance. Negative for agitation, behavioral problems and confusion. Objective:     Vitals:  Weight BMI   Wt Readings from Last 3 Encounters:   21 252 lb 12.8 oz (114.7 kg)   21 256 lb 9.6 oz (116.4 kg)   21 261 lb 6.4 oz (118.6 kg)    Body mass index is 35.26 kg/m².      BP HR SaO2   BP Readings from Last 3 Encounters:   21 122/82   21 120/82   21 132/84 Pulse Readings from Last 3 Encounters:   09/29/21 96   09/24/21 73   08/27/21 92    SpO2 Readings from Last 3 Encounters:   09/29/21 96%   09/24/21 94%   08/27/21 96%        Physical Exam  Vitals reviewed. Constitutional:       General: She is not in acute distress. Appearance: Normal appearance. She is well-developed. She is not toxic-appearing or diaphoretic. HENT:      Head: Normocephalic and atraumatic. Not macrocephalic and not microcephalic. Right Ear: External ear normal.      Left Ear: External ear normal.      Nose: Septal deviation and mucosal edema present. No nasal deformity. Mouth/Throat:      Lips: Pink. Mouth: Mucous membranes are moist.      Tongue: No lesions. Palate: No mass. Pharynx: Uvula midline. No oropharyngeal exudate or uvula swelling. Tonsils: No tonsillar exudate or tonsillar abscesses. Comments: Tonsils: normal size  Eyes:      General: Lids are normal.      Extraocular Movements: Extraocular movements intact. Conjunctiva/sclera: Conjunctivae normal.      Pupils: Pupils are equal, round, and reactive to light. Neck:      Vascular: No JVD. Trachea: Trachea normal.      Comments: Neck Circ: 14.5 inches    Cardiovascular:      Rate and Rhythm: Normal rate and regular rhythm. Heart sounds: Normal heart sounds. Pulmonary:      Effort: Pulmonary effort is normal.      Breath sounds: Normal breath sounds. Abdominal:      General: Bowel sounds are normal.   Musculoskeletal:      Cervical back: Normal range of motion. Comments: No evidence of cyanosis or clubbing of nails   Skin:     General: Skin is warm. Nails: There is no clubbing. Neurological:      General: No focal deficit present. Mental Status: She is alert.    Psychiatric:         Attention and Perception: Attention normal.         Mood and Affect: Mood and affect normal.         Speech: Speech normal.         Behavior: Behavior normal. Behavior is cooperative. Thought Content:  Thought content normal.         Electronically signed by Christine Pizano MD on9/29/2021 at 2:06 PM

## 2021-09-29 NOTE — LETTER
[facility] Sleep Medicine  [place] [id number] [place] [address]  Phone: [phone]  Fax: [phone]           Dylon Crews MD      September 29, 2021     Patient: Ida Kirkland   MR Number: <[id number]>   YOB: 1970   Date of Visit: 9/29/2021       Dear Dr. Diana Yung:    Thank you for referring Anusha Mclean to me for evaluation/treatment. Below are the relevant portions of my assessment and plan of care. Visit Diagnoses and Associated Orders     Obstructive sleep apnea syndrome   (New Problem)  -  Primary    Needs work up    Home Sleep Study (HST) [22244 Custom]   - Future Order         Essential hypertension   (Stable)           Gastroesophageal reflux disease, unspecified whether esophagitis present   (Stable)           Class 2 severe obesity due to excess calories with serious comorbidity and body mass index (BMI) of 35.0 to 35.9 in adult ([facility] [address]   (Stable)                 Attempted to call to get old records, had to leave . Reviewed YORDAN (highest likelihood Dx): pathophysiology, diagnosis, complications and treatment. Instructed her not to drive if drowsy. Continue medications per her PCP and other physicians. Limit caffeine use after 3pm. Standard of care is to do in-lab PSG but insurance is mandating an inferior HST. 1 wk follow up after study to review her results. The chronic medical conditions listed are directly related to the primary diagnosis listed above. The management of the primary diagnosis affects the secondary diagnosis and vice versa. This information was analyzed to assess complexity and medical decision making in regards to further testing and management. Continue meds for: HTN and GERD. Pt would medically benefit from wt loss for YORDAN (diet, exercise, surgical). Orders Placed This Encounter   Procedures    Home Sleep Study (HST)       If you have questions, please do not hesitate to call me.  I look forward to following Ila along with you.     Sincerely,        Abdirahman Clinton MD    CC providers:  Baljit Larson MD  [address]  [id number]  [address] [place]

## 2021-09-30 ENCOUNTER — HOSPITAL ENCOUNTER (OUTPATIENT)
Dept: WOMENS IMAGING | Age: 51
Discharge: HOME | End: 2021-09-30
Payer: COMMERCIAL

## 2021-09-30 DIAGNOSIS — Z12.31 ENCOUNTER FOR SCREENING MAMMOGRAM FOR MALIGNANT NEOPLASM OF BREAST: ICD-10-CM

## 2021-09-30 PROCEDURE — 77063 BREAST TOMOSYNTHESIS BI: CPT

## 2021-10-10 ENCOUNTER — PATIENT MESSAGE (OUTPATIENT)
Dept: FAMILY MEDICINE CLINIC | Age: 51
End: 2021-10-10

## 2021-10-11 NOTE — PROGRESS NOTES
Subjective:      Patient ID: Johanne Diss 48 y.o. female. is here for evaluation for UTI       HPI    On 10/5 was prescribed Macrobid by gyne for UTI. Culture was not done. Occurred one week after she had her annual gyne exam.   She developed vaginal itching after starting the Macrobid. Is using Monistat and is better. Is not getting an better and feels exhausted. Has aching in the groin bilateral.  Mild lower back aching. Feeling that needs to urinate when does not have to go. No hematuria, dysuria. Had bilateral flank pain and nausea when sxs first started. + nausea. No fever, vomiting. Got a bit better with Macrobid but symptoms have not resolved. Lab Results   Component Value Date     05/14/2021    K 4.3 05/14/2021     05/14/2021    CO2 21 05/14/2021    BUN 21 05/14/2021    CREATININE 0.6 05/14/2021    GLUCOSE 118 05/14/2021    CALCIUM 9.2 05/14/2021       Lab Results   Component Value Date    WBC 8.2 05/14/2021    HGB 15.8 05/14/2021    HCT 45.9 05/14/2021    MCV 92.7 05/14/2021     05/14/2021        Outpatient Medications Marked as Taking for the 10/12/21 encounter (Office Visit) with Reno Millard MD   Medication Sig Dispense Refill                  Phentermine-Topiramate (QSYMIA) 15-92 MG CP24 Take 1 capsule by mouth daily for 30 days.  30 capsule 2    Multiple Vitamins-Minerals (HAIR SKIN AND NAILS FORMULA PO) Take by mouth daily TAKE THREE TIMES DAILY      meloxicam (MOBIC) 15 MG tablet Take 1 tablet by mouth daily 90 tablet 2    omeprazole (PRILOSEC) 40 MG delayed release capsule Take 1 capsule by mouth daily 90 capsule 2    valsartan-hydroCHLOROthiazide (DIOVAN-HCT) 160-25 MG per tablet Take 1 tablet by mouth daily 90 tablet 2    atorvastatin (LIPITOR) 20 MG tablet TAKE ONE TABLET BY MOUTH DAILY 90 tablet 2    Cholecalciferol (VITAMIN D) 50 MCG (2000 UT) CAPS capsule Take by mouth      loratadine (CLARITIN) 10 MG tablet Take 10 mg by mouth daily          Allergies   Allergen Reactions    Latex     Bactrim [Sulfamethoxazole-Trimethoprim] Rash       Patient Active Problem List   Diagnosis    Lymphedema of left lower extremity    FH: hemochromatosis    Essential hypertension    Pure hypercholesterolemia    Gastroesophageal reflux disease    Vitamin D deficiency    Hypertensive left ventricular hypertrophy, without heart failure    Class 2 severe obesity due to excess calories with serious comorbidity and body mass index (BMI) of 35.0 to 35.9 in adult [facility])       Past Medical History:   Diagnosis Date    Hypertension     Lymphedema        Past Surgical History:   Procedure Laterality Date    BLADDER SURGERY  2009    bladder suspension     SECTION      x2    ECTOPIC PREGNANCY SURGERY      LYMPH NODE DISSECTION Left 1979    removal of lymph node removal left groin    OTHER SURGICAL HISTORY      septum removal in uterus        Family History   Problem Relation Age of Onset    Hypertension Mother     Thyroid Disease Mother     Breast Cancer Sister 43    Thyroid Disease Sister         Grave's disease    Hemochromatosis Maternal Grandmother     Diabetes Paternal Grandmother        Social History     Tobacco Use    Smoking status: Former Smoker     Packs/day: 1.00     Years: 8.00     Pack years: 8.00     Quit date: 2002     Years since quittin.0    Smokeless tobacco: Never Used   Vaping Use    Vaping Use: Never used   Substance Use Topics    Alcohol use:  Yes     Alcohol/week: 1.0 standard drinks     Types: 1 Glasses of wine per week     Comment: 2 on the weekends occasionally    Drug use: No            Review of Systems  Review of Systems    Objective:   Physical Exam  Vitals:    10/12/21 1438   BP: 130/80   Pulse: 64   Resp: 14   Temp: 97.6 °F (36.4 °C)   TempSrc: Temporal   SpO2: 96%   Weight: 248 lb (112.5 kg)     Wt Readings from Last 3 Encounters:   10/12/21 248 lb (112.5 kg)   21 252 lb 12.8 oz (114.7 kg)   09/24/21 256 lb 9.6 oz (116.4 kg)      Physical Exam      NAD  Skin warm and dry. Chest is clear, no wheezing or rales. Normal symmetric air entry throughout both lung fields. Heart regular with normal rate, no murmer or gallop. The abdomen is soft with mild suprapubic tenderness; no guarding, mass, rebound or organomegaly. Bowel sounds are normal. No CVA tenderness or inguinal adenopathy noted. Assessment:       Diagnosis Orders   1. Acute cystitis without hematuria  cephALEXin (KEFLEX) 500 MG capsule    Culture, Urine    fluconazole (DIFLUCAN) 150 MG tablet          Plan:      Advised to push fluids. Side effects of current medications reviewed and questions answered. Call or return to clinic prn if these symptoms worsen or fail to improve as anticipated.

## 2021-10-12 ENCOUNTER — OFFICE VISIT (OUTPATIENT)
Dept: FAMILY MEDICINE CLINIC | Age: 51
End: 2021-10-12
Payer: COMMERCIAL

## 2021-10-12 VITALS
TEMPERATURE: 97.6 F | HEART RATE: 64 BPM | OXYGEN SATURATION: 96 % | DIASTOLIC BLOOD PRESSURE: 80 MMHG | RESPIRATION RATE: 14 BRPM | BODY MASS INDEX: 34.59 KG/M2 | SYSTOLIC BLOOD PRESSURE: 130 MMHG | WEIGHT: 248 LBS

## 2021-10-12 DIAGNOSIS — N30.00 ACUTE CYSTITIS WITHOUT HEMATURIA: Primary | ICD-10-CM

## 2021-10-12 PROCEDURE — 99213 OFFICE O/P EST LOW 20 MIN: CPT | Performed by: FAMILY MEDICINE

## 2021-10-12 RX ORDER — FLUCONAZOLE 150 MG/1
150 TABLET ORAL ONCE
Qty: 1 TABLET | Refills: 0 | Status: SHIPPED | OUTPATIENT
Start: 2021-10-12 | End: 2021-10-12

## 2021-10-12 RX ORDER — CEPHALEXIN 500 MG/1
500 CAPSULE ORAL 3 TIMES DAILY
Qty: 15 CAPSULE | Refills: 0 | Status: SHIPPED | OUTPATIENT
Start: 2021-10-12 | End: 2021-10-17

## 2021-10-13 LAB — URINE CULTURE, ROUTINE: NORMAL

## 2021-10-18 ENCOUNTER — HOSPITAL ENCOUNTER (OUTPATIENT)
Dept: SLEEP CENTER | Age: 51
Discharge: HOME | End: 2021-10-18
Payer: COMMERCIAL

## 2021-10-18 DIAGNOSIS — G47.33 OBSTRUCTIVE SLEEP APNEA SYNDROME: ICD-10-CM

## 2021-10-18 PROCEDURE — 95806 SLEEP STUDY UNATT&RESP EFFT: CPT

## 2021-10-21 PROCEDURE — 95806 SLEEP STUDY UNATT&RESP EFFT: CPT | Performed by: INTERNAL MEDICINE

## 2021-10-25 ENCOUNTER — TELEPHONE (OUTPATIENT)
Dept: PULMONOLOGY | Age: 51
End: 2021-10-25

## 2021-10-25 NOTE — PROGRESS NOTES
Ila Shobha Jackie Davalosdick         : 1970 [address] [facility]    Diagnosis: [x] YORDAN (G47.33) [] CSA (G47.31) [] Apnea (G47.30)   Length of Need: [x] 12 Months [] 99 Months [] Other:    Machine (CONRADO!): [] Respironics Dream Station   2   Auto [x] ResMed AirSense     Auto S11 [] Other:     [x]  CPAP () [] Bilevel ()   Mode: [x] Auto [] Spontaneous    Mode: [] Auto [] Spontaneous      P min 6 cmH2O  P max 16 cmH2O      Comfort Settings:   - Ramp Pressure: 4 cmH2O                                        - Ramp time: 15 min                                     -  Flex/EPR - 3 full time                                    - For ResMed Bilevel (TiMax-4 sec   TiMin- 0.2 sec)     Humidifier: [x] Heated ()        [x] Water chamber replacement ()/ 1 per 6 months        Mask:   [x] Nasal () /1 per 3 months [] Full Face () /1 per 3 months   [x] Patient choice -Size and fit mask [] Patient Choice - Size and fit mask   [] Dispense:  [] Dispense:    [x] Headgear () / 1 per 3 months [] Headgear () / 1 per 3 months   [x] Replacement Nasal Cushion ()/2 per month [] Interface Replacement ()/1 per month   [] Replacement Nasal Pillows ()/2 per month         Tubing: [x] Heated ()/1 per 3 months    [] Standard ()/1 per 3 months [] Other:           Filters: [x] Non-disposable ()/1 per 6 months     [x] Ultra-Fine, Disposable ()/2 per month        Miscellaneous: [] Chin Strap ()/ 1 per 6 months [] O2 bleed-in:       LPM   [] Oximetry on CPAP/Bilevel []  Other:    [x] Modem: ()         Start Order Date: 10/25/21    MEDICAL JUSTIFICATION:  I, the undersigned, certify that the above prescribed supplies are medically necessary for this patients wellbeing. In my opinion, the supplies are both reasonable and necessary in reference to accepted standards of medicalpractice in treatment of this patients condition.     Bobby Temple MD      NPI: [id number]       Order Signed Date: 10/25/21    Electronically signed by María Amaya MD on 10/25/2021 at 6:06 PM    [address]  1970  [address] [place] [address]  [phone] (home)   [phone] (mobile)      Insurance Info (confirm with patient if correct):  Payor/Plan Subscr  Sex Relation Sub.  Ins. ID Effective Group Num

## 2021-11-22 DIAGNOSIS — E78.00 PURE HYPERCHOLESTEROLEMIA: ICD-10-CM

## 2021-11-23 ENCOUNTER — TELEPHONE (OUTPATIENT)
Dept: FAMILY MEDICINE CLINIC | Age: 51
End: 2021-11-23

## 2021-11-23 DIAGNOSIS — E66.01 CLASS 2 SEVERE OBESITY DUE TO EXCESS CALORIES WITH SERIOUS COMORBIDITY AND BODY MASS INDEX (BMI) OF 35.0 TO 35.9 IN ADULT (HCC): Primary | ICD-10-CM

## 2021-11-23 RX ORDER — ATORVASTATIN CALCIUM 20 MG/1
20 TABLET, FILM COATED ORAL DAILY
Qty: 90 TABLET | Refills: 2 | Status: SHIPPED | OUTPATIENT
Start: 2021-11-23

## 2021-11-23 RX ORDER — PHENTERMINE AND TOPIRAMATE 7.5; 46 MG/1; MG/1
1 CAPSULE, EXTENDED RELEASE ORAL DAILY
Qty: 30 CAPSULE | Refills: 1 | Status: SHIPPED | OUTPATIENT
Start: 2021-11-28 | End: 2021-12-13 | Stop reason: SDUPTHER

## 2021-11-23 NOTE — TELEPHONE ENCOUNTER
Requested Prescriptions     Pending Prescriptions Disp Refills    atorvastatin (LIPITOR) 20 MG tablet 90 tablet 2       lov 10/12/2021  Nov 12/13/21  Labs 5/14/21

## 2021-11-23 NOTE — TELEPHONE ENCOUNTER
Prior authorization for Qsymia 15-92MG ER Capsules has been scanned in and sent to the PA Department.

## 2021-11-23 NOTE — TELEPHONE ENCOUNTER
The script in the chart states ENDED. I need a current script to proceed with PA. Phentermine-Topiramate (QSYMIA) 15-92 MG CP24 [[id number]]  ENDED    Order Details  Dose: 1 capsule Route: Oral Frequency: DAILY   Dispense Quantity: 30 capsule Refills: 2          Sig: Take 1 capsule by mouth daily for 30 days.         Start Date: 09/24/21 End Date: 10/24/21 after 30 doses   Written Date: 09/24/21 Expiration Date: 03/23/22       Diagnosis Association: Class 2 obesity due to excess calories without serious comorbidity with body mass index (BMI) of 37.0 to 37.9 in adult (E66.09 , E27.95)     Providers    Authorizing Provider: Jatinder Huff MD NPI: [id number]   Ordering User:  Jatinder Huff MD      Please advise. Thank you.

## 2021-11-29 NOTE — TELEPHONE ENCOUNTER
Submitted PA for Qsymia 7.5-46MG er capsules, Key: [id number]. Medication has been APPROVED through 02/21/2022. Please notify patient. Thank you.

## 2021-12-12 NOTE — PROGRESS NOTES
change, appetite change, fatigue and unexpected weight change. HENT: Positive for hearing loss and tinnitus. Negative for congestion, nosebleeds, sore throat, trouble swallowing and voice change. Chronic tinnitus. Stable. Mild hearing loss. Eyes: Negative for visual disturbance. Respiratory: Negative for cough, choking, chest tightness, shortness of breath and wheezing. Cardiovascular: Positive for leg swelling. Negative for chest pain and palpitations. Stable edema left leg   Gastrointestinal: Positive for anal bleeding and constipation. Negative for abdominal pain, blood in stool, diarrhea and nausea. Occ bright red blood if strains. Has known hemorrhoids. Genitourinary: Negative for dysuria, flank pain, frequency, hematuria, pelvic pain, urgency, vaginal bleeding and vaginal discharge. Some menses are very light, once went 8 weeks between menses   Musculoskeletal: Positive for arthralgias. Negative for back pain and myalgias. Pain in left sternoclavicular jt x few weeks. Has had left shoulder pain for > one year. No trauma or injury. PT is not helping her shoulder pain. Meloxicam helps some. She saw Dr. Kev Clarke a year ago; he ordered PT. Skin: Negative for rash. Allergic/Immunologic: Negative for environmental allergies. Neurological: Positive for headaches. Negative for weakness and light-headedness. Occ migraine. No aura. Self limiting most of the time but last month had a migraine for 3 weeks. Hematological: Does not bruise/bleed easily. Psychiatric/Behavioral: Negative for dysphoric mood and sleep disturbance. The patient is not nervous/anxious.          Sleeping very well with CPAP       Lab Results   Component Value Date     05/14/2021    K 4.3 05/14/2021     05/14/2021    CO2 21 05/14/2021    BUN 21 (H) 05/14/2021    CREATININE 0.6 05/14/2021    GLUCOSE 118 (H) 05/14/2021    CALCIUM 9.2 05/14/2021    PROT 6.7 05/14/2021 LABALBU 4.5 05/14/2021    BILITOT 0.4 05/14/2021    ALKPHOS 41 05/14/2021    AST 20 05/14/2021    ALT 34 05/14/2021    LABGLOM >60 05/14/2021    GFRAA >60 05/14/2021    AGRATIO 2.0 05/14/2021    GLOB 2.2 05/14/2021        TSH   Date Value Ref Range Status   12/19/2020 1.84 0.27 - 4.20 uIU/mL Final   03/06/2019 2.46 0.27 - 4.20 uIU/mL Final   11/23/2018 2.32 0.27 - 4.20 uIU/mL Final     Lab Results   Component Value Date    CHOL 148 12/19/2020    CHOL 150 12/27/2019    CHOL 187 11/23/2018     Lab Results   Component Value Date    TRIG 128 12/19/2020    TRIG 145 12/27/2019    TRIG 249 (H) 11/23/2018     Lab Results   Component Value Date    HDL 45 12/19/2020    HDL 45 12/27/2019    HDL 43 11/23/2018     Lab Results   Component Value Date    LDLCALC 77 12/19/2020    LDLCALC 76 12/27/2019    LDLCALC 94 11/23/2018     Lab Results   Component Value Date    LABVLDL 26 12/19/2020    LABVLDL 29 12/27/2019    LABVLDL 50 11/23/2018     No results found for: CHOLHDLRATIO   Lab Results   Component Value Date    WBC 8.2 05/14/2021    HGB 15.8 05/14/2021    HCT 45.9 05/14/2021    MCV 92.7 05/14/2021     05/14/2021     Lab Results   Component Value Date    LABA1C 5.8 05/14/2021     Lab Results   Component Value Date    .8 05/14/2021      Objective:   Physical Exam  .  Vitals:    12/13/21 1311   BP: 124/76   Pulse: 78   Resp: 14   Temp: 97.9 °F (36.6 °C)   SpO2: 98%     Wt Readings from Last 3 Encounters:   12/13/21 237 lb 9.6 oz (107.8 kg)   10/12/21 248 lb (112.5 kg)   09/29/21 252 lb 12.8 oz (114.7 kg)        Physical Exam  Constitutional:       Appearance: Normal appearance. She is well-developed. HENT:      Head: Normocephalic and atraumatic. Right Ear: Hearing, tympanic membrane, ear canal and external ear normal.      Left Ear: Hearing, tympanic membrane, ear canal and external ear normal.      Nose: Nose normal.      Mouth/Throat:      Pharynx: Uvula midline.    Eyes:      General: Lids are normal. Conjunctiva/sclera: Conjunctivae normal.      Pupils: Pupils are equal, round, and reactive to light. Funduscopic exam:     Right eye: No hemorrhage, AV nicking or arteriolar narrowing. Left eye: No hemorrhage, AV nicking or arteriolar narrowing. Neck:      Thyroid: No thyroid mass or thyromegaly. Vascular: No carotid bruit or JVD. Trachea: Trachea normal.   Cardiovascular:      Rate and Rhythm: Normal rate and regular rhythm. Pulses:           Carotid pulses are 2+ on the right side and 2+ on the left side. Femoral pulses are 2+ on the right side and 2+ on the left side. Dorsalis pedis pulses are 2+ on the right side and 2+ on the left side. Posterior tibial pulses are 2+ on the right side and 2+ on the left side. Heart sounds: Normal heart sounds. No murmur heard. Pulmonary:      Effort: Pulmonary effort is normal. No respiratory distress. Breath sounds: Normal breath sounds. Chest:   Breasts: Breasts are symmetrical.      Right: No inverted nipple, mass, nipple discharge, skin change, tenderness or supraclavicular adenopathy. Left: No inverted nipple, mass, nipple discharge, skin change, tenderness or supraclavicular adenopathy. Abdominal:      General: Bowel sounds are normal.      Tenderness: There is no abdominal tenderness. Hernia: No hernia is present. Musculoskeletal:      Left shoulder: Swelling and tenderness present. No bony tenderness or crepitus. Decreased range of motion. Normal strength. Normal pulse. Arms:       Cervical back: Neck supple. Lymphadenopathy:      Head:      Right side of head: No submental or submandibular adenopathy. Left side of head: No submental or submandibular adenopathy. Cervical: No cervical adenopathy. Upper Body:      Right upper body: No supraclavicular adenopathy. Left upper body: No supraclavicular adenopathy. Skin:     General: Skin is warm and dry. Findings: No bruising, lesion or rash. Neurological:      Mental Status: She is alert. Deep Tendon Reflexes: Reflexes are normal and symmetric. Reflex Scores:       Patellar reflexes are 2+ on the right side and 2+ on the left side. Psychiatric:         Speech: Speech normal.         Behavior: Behavior normal.         Thought Content: Thought content normal.         Judgment: Judgment normal.           Assessment and Plan       Diagnosis Orders   1. Well adult exam  Comprehensive Metabolic Panel    TSH with Reflex    Hepatitis C Antibody    Discussed diet, exercise   Calcium and Vitamin D addressed  PAP up-to-date   Annual to biannual mammogram  Annual influenza vaccine  Dt every 10 years  Colonoscopy every 10 years until age 76        2. Class 2 severe obesity due to excess calories with serious comorbidity and body mass index (BMI) of 35.0 to 35.9 in adult (HCC)  Phentermine-Topiramate (QSYMIA) 7.5-46 MG CP24   3. Essential hypertension  At goal < 130/80  Continue current rx   4. Pure hypercholesterolemia  Comprehensive Metabolic Panel    Lipid Panel   5. Hyperglycemia  Hemoglobin A1C   6. Need for vaccination  Shingles addressed   7. Osteoarthritis of AC (acromioclavicular) joint  Via Eddie Freeman MD, Orthopedic Surgery, [facility]   8. Intractable migraine without aura and without status migrainosus  naratriptan (AMERGE) 2.5 MG tablet  Triggers, prevention, rebound and treatment discussed. 9. Bilateral hearing loss, unspecified hearing loss type  Jaime Moran (R), [place], [place]. BIANCA, Audiology, [facility]   10. Drug-induced constipation  polyethylene glycol (MIRALAX) 17 GM/SCOOP powder          Side effects of current medications reviewed and questions answered. Follow up in 3 months or prn.

## 2021-12-13 ENCOUNTER — PATIENT MESSAGE (OUTPATIENT)
Dept: FAMILY MEDICINE CLINIC | Age: 51
End: 2021-12-13

## 2021-12-13 ENCOUNTER — OFFICE VISIT (OUTPATIENT)
Dept: FAMILY MEDICINE CLINIC | Age: 51
End: 2021-12-13
Payer: COMMERCIAL

## 2021-12-13 VITALS
HEART RATE: 78 BPM | OXYGEN SATURATION: 98 % | TEMPERATURE: 97.9 F | DIASTOLIC BLOOD PRESSURE: 76 MMHG | RESPIRATION RATE: 14 BRPM | BODY MASS INDEX: 33.26 KG/M2 | SYSTOLIC BLOOD PRESSURE: 124 MMHG | WEIGHT: 237.6 LBS | HEIGHT: 71 IN

## 2021-12-13 DIAGNOSIS — K59.03 DRUG-INDUCED CONSTIPATION: ICD-10-CM

## 2021-12-13 DIAGNOSIS — Z23 NEED FOR VACCINATION: ICD-10-CM

## 2021-12-13 DIAGNOSIS — H91.93 BILATERAL HEARING LOSS, UNSPECIFIED HEARING LOSS TYPE: ICD-10-CM

## 2021-12-13 DIAGNOSIS — E66.01 CLASS 2 SEVERE OBESITY DUE TO EXCESS CALORIES WITH SERIOUS COMORBIDITY AND BODY MASS INDEX (BMI) OF 35.0 TO 35.9 IN ADULT (HCC): ICD-10-CM

## 2021-12-13 DIAGNOSIS — E66.01 CLASS 2 SEVERE OBESITY DUE TO EXCESS CALORIES WITH SERIOUS COMORBIDITY AND BODY MASS INDEX (BMI) OF 35.0 TO 35.9 IN ADULT (HCC): Primary | ICD-10-CM

## 2021-12-13 DIAGNOSIS — R73.9 HYPERGLYCEMIA: ICD-10-CM

## 2021-12-13 DIAGNOSIS — G43.019 INTRACTABLE MIGRAINE WITHOUT AURA AND WITHOUT STATUS MIGRAINOSUS: ICD-10-CM

## 2021-12-13 DIAGNOSIS — I10 ESSENTIAL HYPERTENSION: ICD-10-CM

## 2021-12-13 DIAGNOSIS — Z00.00 WELL ADULT EXAM: Primary | ICD-10-CM

## 2021-12-13 DIAGNOSIS — M19.019 OSTEOARTHRITIS OF AC (ACROMIOCLAVICULAR) JOINT: ICD-10-CM

## 2021-12-13 DIAGNOSIS — E78.00 PURE HYPERCHOLESTEROLEMIA: ICD-10-CM

## 2021-12-13 PROCEDURE — 99396 PREV VISIT EST AGE 40-64: CPT | Performed by: FAMILY MEDICINE

## 2021-12-13 RX ORDER — PHENTERMINE AND TOPIRAMATE 7.5; 46 MG/1; MG/1
1 CAPSULE, EXTENDED RELEASE ORAL DAILY
Qty: 30 CAPSULE | Refills: 2 | Status: SHIPPED | OUTPATIENT
Start: 2021-12-29 | End: 2022-01-04

## 2021-12-13 RX ORDER — NARATRIPTAN 2.5 MG/1
TABLET ORAL
Qty: 12 TABLET | Refills: 5 | Status: SHIPPED | OUTPATIENT
Start: 2021-12-13

## 2021-12-13 RX ORDER — POLYETHYLENE GLYCOL 3350 17 G/17G
17 POWDER, FOR SOLUTION ORAL DAILY
Qty: 510 G | Refills: 0 | COMMUNITY
Start: 2021-12-13

## 2021-12-13 ASSESSMENT — ENCOUNTER SYMPTOMS
COUGH: 0
SHORTNESS OF BREATH: 0
CHEST TIGHTNESS: 0
ABDOMINAL PAIN: 0
NAUSEA: 0
BACK PAIN: 0
WHEEZING: 0
BLOOD IN STOOL: 0
CONSTIPATION: 1
ANAL BLEEDING: 1
DIARRHEA: 0
CHOKING: 0
SORE THROAT: 0
TROUBLE SWALLOWING: 0
VOICE CHANGE: 0

## 2021-12-14 RX ORDER — PHENTERMINE AND TOPIRAMATE 15; 92 MG/1; MG/1
1 CAPSULE, EXTENDED RELEASE ORAL DAILY
Qty: 30 CAPSULE | Refills: 2 | Status: SHIPPED | OUTPATIENT
Start: 2021-12-14 | End: 2022-03-14 | Stop reason: SDUPTHER

## 2021-12-22 DIAGNOSIS — E78.00 PURE HYPERCHOLESTEROLEMIA: ICD-10-CM

## 2021-12-22 DIAGNOSIS — Z00.00 WELL ADULT EXAM: ICD-10-CM

## 2021-12-22 DIAGNOSIS — R73.9 HYPERGLYCEMIA: ICD-10-CM

## 2021-12-22 LAB
A/G RATIO: 1.8 (ref 1.1–2.2)
ALBUMIN SERPL-MCNC: 4.4 G/DL (ref 3.4–5)
ALP BLD-CCNC: 60 U/L (ref 40–129)
ALT SERPL-CCNC: 24 U/L (ref 10–40)
ANION GAP SERPL CALCULATED.3IONS-SCNC: 14 MMOL/L (ref 3–16)
AST SERPL-CCNC: 18 U/L (ref 15–37)
BILIRUB SERPL-MCNC: 0.5 MG/DL (ref 0–1)
BUN BLDV-MCNC: 18 MG/DL (ref 7–20)
CALCIUM SERPL-MCNC: 9.2 MG/DL (ref 8.3–10.6)
CHLORIDE BLD-SCNC: 102 MMOL/L (ref 99–110)
CHOLESTEROL, TOTAL: 166 MG/DL (ref 0–199)
CO2: 21 MMOL/L (ref 21–32)
CREAT SERPL-MCNC: 0.7 MG/DL (ref 0.6–1.1)
GFR AFRICAN AMERICAN: >60
GFR NON-AFRICAN AMERICAN: >60
GLUCOSE BLD-MCNC: 109 MG/DL (ref 70–99)
HDLC SERPL-MCNC: 47 MG/DL (ref 40–60)
HEPATITIS C ANTIBODY INTERPRETATION: NORMAL
LDL CHOLESTEROL CALCULATED: 90 MG/DL
POTASSIUM SERPL-SCNC: 3.6 MMOL/L (ref 3.5–5.1)
SODIUM BLD-SCNC: 137 MMOL/L (ref 136–145)
TOTAL PROTEIN: 6.8 G/DL (ref 6.4–8.2)
TRIGL SERPL-MCNC: 144 MG/DL (ref 0–150)
TSH REFLEX: 2.23 UIU/ML (ref 0.27–4.2)
VLDLC SERPL CALC-MCNC: 29 MG/DL

## 2021-12-23 LAB
ESTIMATED AVERAGE GLUCOSE: 116.9 MG/DL
HBA1C MFR BLD: 5.7 %

## 2022-01-04 ENCOUNTER — OFFICE VISIT (OUTPATIENT)
Dept: ORTHOPEDIC SURGERY | Age: 52
End: 2022-01-04
Payer: COMMERCIAL

## 2022-01-04 VITALS — HEIGHT: 71 IN | BODY MASS INDEX: 33.18 KG/M2 | WEIGHT: 237 LBS

## 2022-01-04 DIAGNOSIS — M75.32 CALCIFIC TENDINITIS OF LEFT SHOULDER: ICD-10-CM

## 2022-01-04 DIAGNOSIS — M25.512 LEFT SHOULDER PAIN, UNSPECIFIED CHRONICITY: Primary | ICD-10-CM

## 2022-01-04 PROCEDURE — 99214 OFFICE O/P EST MOD 30 MIN: CPT | Performed by: ORTHOPAEDIC SURGERY

## 2022-01-09 NOTE — PROGRESS NOTES
History of Present Illness:  Manohar Simmons is a delightful young 46year old woman who returns for follow-up of her shoulders. Her left shoulder has continued to bother her. We saw her back in September 2020 and diagnosed calcific tendinitis. We offered her conservative treatment vs. Surgery and she has been trying to live with her symptoms. Her right shoulder had some rotator cuff tendinitis but symptoms have improved. Her left shoulder continues to bother her. She wants to consider definitive treatment at this time. Medical History:  Patient's medications, allergies, past medical, surgical, social and family histories were reviewed and updated as appropriate. Pertinent items are noted in HPI  Review of systems reviewed from Patient History Form dated on 1/4/22 and available in the patient's chart under the Media tab. Vital Signs: There were no vitals filed for this visit. Constitutional: Tall young woman in no apparent distress. Left Shoulder Examination:    Inspection:  The shoulders are benign appearing without signs of acute trauma. Palpation:  Tender over the rotator cuff insertion. Slight tenderness over the biceps    Active Range of Motion: Well preserved range of motion but painful at the end. IR go T12  Vs. T17    Passive Range of Motion:  Positive Neer with terminal passive elevation. Strength:  4/5 supraspinatus strength. 4+/5 ER and normal IR>    Special Tests:  Positive impingement signs. Pain over the biceps tendon with cross body adduction. Radiology:     Plain radiographs of the left shoulder comprising 3 views: true AP int/ext and axillary lateral were obtained and reviewed in the office: these show a stable calcific tendinitis deposit unchanged from 2020. Impression: Chronic left shoulder calcific tendinitis. Assessment :  Ila Bruno has chronic left shoulder calcific tendinitis.  She can try and live with this, with some conservative measures or she can opt for definitive treatment comprising arthroscopic excision, subacromial decompression, and possible rotator cuff repair. Impression:  Encounter Diagnoses   Name Primary?  Left shoulder pain, unspecified chronicity Yes    Calcific tendinitis of left shoulder        Office Procedures:  Orders Placed This Encounter   Procedures    XR SHOULDER LEFT (MIN 2 VIEWS)     Standing Status:   Future     Number of Occurrences:   1     Standing Expiration Date:   1/4/2023     Order Specific Question:   Reason for exam:     Answer:   PAIN       Treatment Plan: We a lengthy discussion with Isaiah Antonio regarding treatment options. She would like to consider surgery. We discussed timeline to recovery, based on whether or not she will have an associated small rotator cuff tear that may need repair after excising the calcific deposits. Risks, benefits and potential complications of arthroscopic shoulder surgery were discussed with the patient. Risks discussed include but are not limited to bleeding, infection, anesthetic risk, injury to nerves and blood vessels, deep vein thrombosis, residual stiffness and weakness, and the need for revision surgery. The patient also understands that anesthetic risks include cardiopulmonary issues, drug reactions and even death. The patient voices an understanding of the importance of physical therapy and home exercises after surgery. All questions were answered and written informed consent for surgery was obtained today. She will let us know about timing of surgery and we will fit her with an UltraSling brace preoperatively. Rafael Mya MD, PhD  1/4/2022

## 2022-01-19 ENCOUNTER — OFFICE VISIT (OUTPATIENT)
Dept: PULMONOLOGY | Age: 52
End: 2022-01-19
Payer: COMMERCIAL

## 2022-01-19 VITALS
HEART RATE: 78 BPM | DIASTOLIC BLOOD PRESSURE: 80 MMHG | WEIGHT: 236.8 LBS | HEIGHT: 71 IN | TEMPERATURE: 97.5 F | SYSTOLIC BLOOD PRESSURE: 124 MMHG | OXYGEN SATURATION: 98 % | BODY MASS INDEX: 33.15 KG/M2

## 2022-01-19 DIAGNOSIS — E66.01 CLASS 2 SEVERE OBESITY DUE TO EXCESS CALORIES WITH SERIOUS COMORBIDITY AND BODY MASS INDEX (BMI) OF 35.0 TO 35.9 IN ADULT (HCC): Chronic | ICD-10-CM

## 2022-01-19 DIAGNOSIS — K21.9 GASTROESOPHAGEAL REFLUX DISEASE, UNSPECIFIED WHETHER ESOPHAGITIS PRESENT: Chronic | ICD-10-CM

## 2022-01-19 DIAGNOSIS — I10 ESSENTIAL HYPERTENSION: Chronic | ICD-10-CM

## 2022-01-19 DIAGNOSIS — G47.33 OBSTRUCTIVE SLEEP APNEA SYNDROME: Primary | ICD-10-CM

## 2022-01-19 PROCEDURE — 99214 OFFICE O/P EST MOD 30 MIN: CPT | Performed by: INTERNAL MEDICINE

## 2022-01-19 ASSESSMENT — SLEEP AND FATIGUE QUESTIONNAIRES
ESS TOTAL SCORE: 1
HOW LIKELY ARE YOU TO NOD OFF OR FALL ASLEEP WHILE SITTING QUIETLY AFTER LUNCH WITHOUT ALCOHOL: 0
HOW LIKELY ARE YOU TO NOD OFF OR FALL ASLEEP WHILE LYING DOWN TO REST IN THE AFTERNOON WHEN CIRCUMSTANCES PERMIT: 0
HOW LIKELY ARE YOU TO NOD OFF OR FALL ASLEEP WHILE SITTING INACTIVE IN A PUBLIC PLACE: 0
HOW LIKELY ARE YOU TO NOD OFF OR FALL ASLEEP WHILE SITTING AND READING: 1
HOW LIKELY ARE YOU TO NOD OFF OR FALL ASLEEP IN A CAR, WHILE STOPPED FOR A FEW MINUTES IN TRAFFIC: 0
HOW LIKELY ARE YOU TO NOD OFF OR FALL ASLEEP WHEN YOU ARE A PASSENGER IN A CAR FOR AN HOUR WITHOUT A BREAK: 0
HOW LIKELY ARE YOU TO NOD OFF OR FALL ASLEEP WHILE WATCHING TV: 0
HOW LIKELY ARE YOU TO NOD OFF OR FALL ASLEEP WHILE SITTING AND TALKING TO SOMEONE: 0

## 2022-01-19 NOTE — LETTER
[facility]  [place] [id number] [place] [address]  Phone: [phone]  Fax: [phone]    Anthony Winslow MD    January 19, 2022     Reese Fink MD  [facility] [address] [address]    Patient: John Espino   MR Number: [id number]   YOB: 1970   Date of Visit: 1/19/2022       Dear Deborah Toribio:    Thank you for referring Alfonso Miranda to me for evaluation/treatment. Below are the relevant portions of my assessment and plan of care. 1. Obstructive sleep apnea syndrome  Assessment & Plan:  New Problem - On Tx. Reviewed sleep study and download compliance data with patient. Supplies and parts as needed for her machine. These are medically necessary. Limit caffeine use after 3pm. Discussed adjusting tube heat or wrapping the tubing. 2. Essential hypertension  Assessment & Plan:  Chronic- Stable. Discussed the importance of treating sleep apnea as part of the management of this disorder. Cont any meds per PCP and other physicians. 3. Gastroesophageal reflux disease, unspecified whether esophagitis present  Assessment & Plan:  Chronic- Stable. Discussed the importance of treating sleep apnea as part of the management of this disorder. Cont any meds per PCP and other physicians. 4. Class 2 severe obesity due to excess calories with serious comorbidity and body mass index (BMI) of 35.0 to 35.9 in adult [facility])  Assessment & Plan:  Chronic-not stable:  Discussed importance of treating obstructive sleep apnea and getting sufficient sleep to assist with weight control. Encouraged her to work on weight loss through diet and exercise. Recommended DASH or Mediterranean diets. Reviewed, analyzed, and documented physiologic data from patient's PAP machine. This information was analyzed to assess complexity and medical decision making in regards to further testing and management.     The primary encounter diagnosis was Obstructive sleep apnea syndrome. Diagnoses of Essential hypertension, Gastroesophageal reflux disease, unspecified whether esophagitis present, and Class 2 severe obesity due to excess calories with serious comorbidity and body mass index (BMI) of 35.0 to 35.9 in [facility]) were also pertinent to this visit. The chronic medical conditions listed are directly related to the primary diagnosis listed above. The management of the primary diagnosis affects the secondary diagnosis and vice versa. If you have questions, please do not hesitate to call me. I look forward to following Ila along with you.     Sincerely,      Roger Florez MD

## 2022-01-19 NOTE — PROGRESS NOTES
Yvonne Carbone MD, [facility], [facility]  [address]  [address] [facility], [address] [phone]   [facility] [address] Nava Winston. [address] [place] [address]. Yanet Jay [address] [phone] SLEEP MEDICINE  [address] [place] [id number] [phone]      Assessment/Plan:      1. Obstructive sleep apnea syndrome  Assessment & Plan:  New Problem - On Tx. Reviewed sleep study and download compliance data with patient. Supplies and parts as needed for her machine. These are medically necessary. Limit caffeine use after 3pm. Discussed adjusting tube heat or wrapping the tubing. 2. Essential hypertension  Assessment & Plan:  Chronic- Stable. Discussed the importance of treating sleep apnea as part of the management of this disorder. Cont any meds per PCP and other physicians. 3. Gastroesophageal reflux disease, unspecified whether esophagitis present  Assessment & Plan:  Chronic- Stable. Discussed the importance of treating sleep apnea as part of the management of this disorder. Cont any meds per PCP and other physicians. 4. Class 2 severe obesity due to excess calories with serious comorbidity and body mass index (BMI) of 35.0 to 35.9 in adult [facility])  Assessment & Plan:  Chronic-not stable:  Discussed importance of treating obstructive sleep apnea and getting sufficient sleep to assist with weight control. Encouraged her to work on weight loss through diet and exercise. Recommended DASH or Mediterranean diets. Reviewed, analyzed, and documented physiologic data from patient's PAP machine. This information was analyzed to assess complexity and medical decision making in regards to further testing and management. The primary encounter diagnosis was Obstructive sleep apnea syndrome.  Diagnoses of Essential hypertension, Gastroesophageal reflux disease, unspecified whether esophagitis present, and Class 2 severe obesity due to excess calories with serious comorbidity and body mass index (BMI) of 35.0 to 35.9 in adult [facility]) were also pertinent to this visit. The chronic medical conditions listed are directly related to the primary diagnosis listed above. The management of the primary diagnosis affects the secondary diagnosis and vice versa. Subjective:   Subjective   Patient ID: Kim Gavin is a 46 y.o. female. Chief Complaint   Patient presents with    Sleep Apnea       HPI:  Machine Modem/Download Info:  Compliance (hours/night): 7.9 hrs/night  % of nights >= 4 hrs: 98 %  Download AHI (/hour): 0.2 /HR  Average CPAP Pressure : 7 cmH2O APAP - Settings  Pressure Min: 6 cmH2O  Pressure Max: 16 cmH2O                 Comfort Settings  Flex/EPR (0-3): 3       Had insurance mandated HST on 10/19/21 which showed and DELORIS-9.4/hr and low sat-88% with time below 88% of 0 min. Feels better on her machine. Sleeping better, waking more rested. Having some dryness issues but with higher humidity has rain-out. Likes to keeps her room very cold and does not like the warmer air. [address]    Tyro - Total score: 1    Social History     Socioeconomic History    Marital status:      Spouse name: Not on file    Number of children: Not on file    Years of education: Not on file    Highest education level: Not on file   Occupational History    Not on file   Tobacco Use    Smoking status: Former Smoker     Packs/day: 1.00     Years: 8.00     Pack years: 8.00     Quit date: 2002     Years since quittin.3    Smokeless tobacco: Never Used   Vaping Use    Vaping Use: Never used   Substance and Sexual Activity    Alcohol use:  Yes     Alcohol/week: 1.0 standard drink     Types: 1 Glasses of wine per week     Comment: 2 on the weekends occasionally    Drug use: No    Sexual activity: Yes     Partners: Male   Other Topics Concern    Not on file Social History Narrative    Not on file     Social Determinants of Health     Financial Resource Strain: Low Risk     Difficulty of Paying Living Expenses: Not hard at all   Food Insecurity: No Food Insecurity    Worried About Running Out of Food in the Last Year: Never true    [address] in the Last Year: Never true   Transportation Needs:     Lack of Transportation (Medical): Not on file    Lack of Transportation (Non-Medical):  Not on file   Physical Activity:     Days of Exercise per Week: Not on file    Minutes of Exercise per Session: Not on file   Stress:     Feeling of Stress : Not on file   Social Connections:     Frequency of Communication with Friends and Family: Not on file    Frequency of Social Gatherings with Friends and Family: Not on file    Attends Confucianism Services: Not on file    Active Member of [address] [place] [address] or Organizations: Not on file    Attends Club or Organization Meetings: Not on file    Marital Status: Not on file   Intimate Partner Violence:     Fear of Current or Ex-Partner: Not on file    Emotionally Abused: Not on file    Physically Abused: Not on file    Sexually Abused: Not on file   Housing Stability:     Unable to Pay for Housing in the Last Year: Not on file    Number of Places Lived in the Last Year: Not on file    Unstable Housing in the Last Year: Not on file       Current Outpatient Medications   Medication Instructions    atorvastatin (LIPITOR) 20 mg, Oral, DAILY    Cholecalciferol (VITAMIN D) 50 MCG (2000 UT) CAPS capsule Oral    loratadine (CLARITIN) 10 mg, Oral, DAILY    meloxicam (MOBIC) 15 mg, Oral, DAILY    Multiple Vitamins-Minerals (HAIR SKIN AND NAILS FORMULA PO) Oral, DAILY, TAKE THREE TIMES DAILY     naratriptan (AMERGE) 2.5 MG tablet 2.5 mg at onset of headache, may repeat in 4 hours if needed    omeprazole (PRILOSEC) 40 mg, Oral, DAILY    Phentermine-Topiramate (QSYMIA) 15-92 MG CP24 1 capsule, Oral, DAILY    polyethylene glycol (MIRALAX) 17 g, Oral, DAILY    valsartan-hydroCHLOROthiazide (DIOVAN-HCT) 160-25 MG per tablet 1 tablet, Oral, DAILY          Vitals:  Weight BMI   Wt Readings from Last 3 Encounters:   01/19/22 236 lb 12.8 oz (107.4 kg)   01/04/22 237 lb (107.5 kg)   12/13/21 237 lb 9.6 oz (107.8 kg)    Body mass index is 33.03 kg/m².      BP HR SaO2   BP Readings from Last 3 Encounters:   01/19/22 124/80   12/13/21 124/76   10/12/21 130/80    Pulse Readings from Last 3 Encounters:   01/19/22 78   12/13/21 78   10/12/21 64    SpO2 Readings from Last 3 Encounters:   01/19/22 98%   12/13/21 98%   10/12/21 96%        Electronically signed by Geoff Posadas MD on 1/19/2022 at 2:55 PM

## 2022-02-14 NOTE — PROGRESS NOTES
Josefina Trinh   1970, 46 y.o. female   [id number]       Referring Provider: Candice Melo MD   Referral Type: In an order in [address] [place] [address]    Reason for Visit: Evaluation of suspected change in hearing, tinnitus, or balance. ADULT AUDIOLOGIC EVALUATION      Ila Bowman is a 46 y.o. female seen today, 2/18/2022, for an initial audiologic evaluation. AUDIOLOGIC AND OTHER PERTINENT MEDICAL HISTORY:        Josefina Trinh noted decreased hearing bilaterally; tinnitus bilaterally, intermittent; some unsteadiness, denied any spinning sensation; noise exposure from listening to loud music; history of head trauma in the past, none recently. Ila Bowman denied otalgia, aural fullness, otorrhea, history of ear surgery, and family history of hearing loss. IMPRESSIONS:       Today's results are consistent with bilateral mild high frequency sensorineural hearing loss with notch configuration, with normal middle ear function and excellent word recognition for soft conversational speech bilaterally. Hearing loss is significant enough to result in difficulty understanding speech in at least some listening environments, such as those with background noise. Discussed tinnitus management strategies, good communication strategies, and safe listening levels. ASSESSMENT AND FINDINGS:       Otoscopy revealed: Clear ear canals bilaterally      RIGHT EAR:  Hearing Sensitivity: Within normal limits through 2000 Hz sloping to mild sensorineural hearing loss with notch at 4000 Hz. Speech Recognition Threshold: 10 dBHL  Word Recognition: Excellent (100%), based on NU-6 25-word list at 45 dBHL using recorded speech stimuli. Tympanometry: Normal peak pressure and compliance, Type A tympanogram, consistent with normal middle ear function.       LEFT EAR:  Hearing Sensitivity: Within normal limits through 2000 Hz sloping to mild sensorineural hearing loss with notch at 3000 Hz.  Speech Recognition Threshold: 15 dBHL  Word Recognition: Excellent (96%), based on NU-6 25-word list at 45 dBHL using recorded speech stimuli. Tympanometry: Normal peak pressure and compliance, Type A tympanogram, consistent with normal middle ear function. Reliability: Good  Transducer: Inserts    See scanned audiogram dated 2/18/2022 for results. PATIENT EDUCATION:       The following items were discussed with the patient:   - Good Communication Strategies  - Hearing Loss and Hearing Aids  - Tinnitus Management Strategies    - Noise-Induced Hearing Loss and use of Hearing Protection Devices (HPDs)     Educational information was shared in the After Visit Summary. RECOMMENDATIONS:                                                                                                                                                                                                                                                                      The following items are recommended based on patient report and results from today's appointment:  - Continue medical follow-up with Julio Evans MD.  - Retest hearing as medically indicated and/or sooner if a change in hearing is noted. - Briefly discussed future considerations for amplification. If desired, schedule a Hearing Aid Evaluation (HAE) appointment to discuss hearing aid options. - Utilize \"Good Communication Strategies\" as discussed to assist in speech understanding with communication partners. - Maintain a sound enriched environment to assist in the management of tinnitus symptoms.  - Use hearing protection devices (HPDs), such as protective ear muffs and ear plugs, when exposed to dangerous sound levels.          [facility] [place], [place]  Audiologist      Chart CC'd to: Julio Evans MD       Degree of   Hearing Sensitivity dB Range   Within Normal Limits (WNL) 0 - 20 Mild 20 - 40   Moderate 40 - 55   Moderately-Severe 55 - 70   Severe 70 - 90   Profound 90 +

## 2022-02-15 DIAGNOSIS — M75.81 TENDINITIS OF RIGHT ROTATOR CUFF: ICD-10-CM

## 2022-02-15 RX ORDER — MELOXICAM 15 MG/1
15 TABLET ORAL DAILY
Qty: 90 TABLET | Refills: 2 | Status: SHIPPED | OUTPATIENT
Start: 2022-02-15 | End: 2022-10-20 | Stop reason: SDUPTHER

## 2022-02-15 NOTE — TELEPHONE ENCOUNTER
Requested Prescriptions     Pending Prescriptions Disp Refills    meloxicam (MOBIC) 15 MG tablet 90 tablet 2     Sig: Take 1 tablet by mouth daily     Last ov 12/13/2021  Last labs 12/22/21

## 2022-02-18 ENCOUNTER — PROCEDURE VISIT (OUTPATIENT)
Dept: AUDIOLOGY | Age: 52
End: 2022-02-18
Payer: COMMERCIAL

## 2022-02-18 DIAGNOSIS — R42 DIZZINESS AND GIDDINESS: ICD-10-CM

## 2022-02-18 DIAGNOSIS — H90.3 SENSORINEURAL HEARING LOSS, BILATERAL: Primary | ICD-10-CM

## 2022-02-18 DIAGNOSIS — H93.13 TINNITUS, BILATERAL: ICD-10-CM

## 2022-02-18 PROCEDURE — 92567 TYMPANOMETRY: CPT | Performed by: AUDIOLOGIST

## 2022-02-18 PROCEDURE — 92557 COMPREHENSIVE HEARING TEST: CPT | Performed by: AUDIOLOGIST

## 2022-02-18 NOTE — Clinical Note
Dr. Petrina Primrose,    Thank you for your referral for audiologic testing on this patient. Today's results are consistent with bilateral mild high frequency sensorineural hearing loss with notch configuration, with normal middle ear function and excellent word recognition for soft conversational speech bilaterally. Hearing loss is significant enough to result in difficulty understanding speech in at least some listening environments, such as those with background noise. Discussed tinnitus management strategies, good communication strategies, and safe listening levels. If you have any questions, or if there is anything else you need, please let me know.       Best,    [address], [place]  Audiologist  ---  [address]  [facility] - Audiology

## 2022-02-18 NOTE — PATIENT INSTRUCTIONS
Tinnitus: Overview and Management Strategies          Many people have some ringing sounds in their ears once in a while. You may hear a roar, a hiss, a tinkle, or a buzz. The sound usually lasts only a few minutes. If it goes on all the time, you may have tinnitus. Tinnitus is usually caused by long-term exposure to loud noise. This damages the nerves in the inner ear. It can occur with all types of hearing loss. It may be a symptom of almost any ear problem. Tinnitus may be caused by a buildup of earwax. Or, it may be caused by ear infections or certain medicines (especially antibiotics or large amounts of aspirin). You can also hear noises in your ears because of an injury to the ears, drinking too much alcohol or caffeine, or a medical condition. Other conditions may also contribute to tinnitus, including: head and neck trauma, temporomandibular joint disorder (TMJ), sinus pressure and barometric trauma, traumatic brain injury, metabolic disorders, autoimmune disorders, stress, and high blood pressure. You may need tests to evaluate your hearing and to find causes of long-lasting tinnitus. Your doctor may suggest one or more treatments to help you cope with the tinnitus. You can also do things at home to help reduce symptoms. Follow-up care is a key part of your treatment and safety. Be sure to make and go to all appointments, and call your doctor if you are having problems. It's also a good idea to know your test results and keep a list of the medicines you take. How can you care for yourself at home? · Limit or cut out alcohol, caffeine, and sodium. They can make your symptoms worse. · Do not smoke or use other tobacco products. Nicotine reduces blood flow to the ear and makes tinnitus worse. If you need help quitting, talk to your doctor about stop-smoking programs and medicines. These can increase your chances of quitting for good.   · Talk to your doctor about whether to stop taking aspirin and similar products such as ibuprofen or naproxen. · Get exercise often. It can help improve blood flow to the ear. Ways to manage/cope with tinnitus  Some tinnitus may last a long time. To manage your tinnitus, try to:  · Avoid noises that you think caused your tinnitus. If you can't avoid loud noises, wear earplugs or earmuffs. · Ignore the sound by paying attention to other things. Keeping your brain busy with other tasks or background noise can help your brain not focus on the tinnitus. · Try to not give the tinnitus an emotional reaction. Do your best to ignore the sound and not let it bother you. Relax using biofeedback, meditation, or yoga. Feeling stressed and being tired can make tinnitus worse. · Play music or white noise to help you sleep. Background noise may cover up the noise that you hear in your ears. You can buy a tabletop machine or a device that sits under your pillow to play soothing sounds, like ocean waves. · Smart phones have free apps, such as Whist, Relax Melodies, ReSound Relief, and White Noise Lite. These apps have different types of sounds/noise, some of which you can blend together to find sounds that are most soothing to you. · Hearing aid technology, especially when there is some hearing loss, may help reduce tinnitus symptoms by giving your brain better access to the sounds it is missing. There are some hearing aids with built-in noise generator programs, which may help when amplification alone is not enough. Additional resources may be found through the [employer]inn[employer] at www.carolyn.org    When should you call for help? Call 911 anytime you think you may need emergency care. For example, call if:    · You have symptoms of a stroke. These may include:  ? Sudden numbness, tingling, weakness, or loss of movement in your face, arm, or leg, especially on only one side of your body. ? Sudden vision changes. ? Sudden trouble speaking.   ? Sudden confusion or trouble understanding simple statements. ? Sudden problems with walking or balance. ? A sudden, severe headache that is different from past headaches. Call your doctor now or seek immediate medical care if:    · You develop other symptoms. These may include hearing loss (or worse hearing loss), balance problems, dizziness, nausea, or vomiting. Watch closely for changes in your health, and be sure to contact your doctor if:    · Your tinnitus moves from both ears to one ear. · Your hearing loss gets worse within 1 day after an ear injury. · Your tinnitus or hearing loss does not get better within 1 week after an ear injury. · Your tinnitus bothers you enough that you want to take medicines to help you cope with it. If you notice changes in your tinnitus and/or your hearing, it is recommended that you have your hearing tested by your audiologist and to follow-up with your physician that manages your hearing loss (such as your ENT or Primary Care doctor). Good Communication Strategies    Communication can be challenging for anyone, but can be especially difficult for those with some degree of hearing loss. While we may not be able to control every factor that may lead to difficulty with communication, there are Good Communication Strategies that we can all use in our day-to-day lives. Communication takes both parties working together for it to be successful. Tips as a Listener:   1. Control your environment. It is important to limit the amount of background noise in the room when possible. You should also consider having a good light source in the room to best see the other person. 2. Ask for clarification. Instead of saying \"What?\", you can use parts of what you heard to make a new question. For example, if you heard the word \"Thursday\" but not the rest of the week, you may ask \"What was that about Thursday? \" or \"What did you want to do Thursday? \".   This shows the person talking that you are listening and will help them better explain what they are saying. 3. Be an advocate for yourself. If you are hearing but not understanding, tell the other person \"I can hear you, but I need you to slow down when you speak. \"  Or if someone is facing the other direction, say \"I cannot hear you when you are not looking at me when we talk. \"       Tips as a Talker:   - Sit or stand 3 to 6 feet away to maximize audibility         -- It is unrealistic to believe someone else will fully hear your message if you are speaking from across the room or in a different room in the house   - Stay at eye level to help with visual cues   - Make sure you have the persons attention before speaking   - Use facial expressions and gestures to accentuate your message   - Raise your voice slightly (do not scream)   - Speak slowly and distinctly   - Use short, simple sentences   - Rephrase your words if the person is having a hard time understanding you    - To avoid distortion, dont speak directly into a persons ear      Some additional items that may be helpful:   - Remain patient - this is important for both parties   - Write down items that still cannot be heard/understood. You may write with pen/paper or consider typing/texting on a cell phone or smart device. - If background noise is unavoidable, try to keep yourself in a good position in the room. By sitting at a sy on the side of the restaurant (preferably a corner), it will be easier to communicate than if you were sitting at a table in the middle with background noise surrounding you. Keep yourself positioned away from music speakers or heavy foot traffic.   - If you have difficulty with the television, consider these options:      -- Use closed-captioning, which is a setting you can turn on that displays the spoken words in a written form on the screen. There may be a slight delay, but this can help fill in missing information.   This can be especially helpful when watching programs with accented speech. -- Consider use of a sound bar or speakers that come from the front of the TV. With modern flat screen TVs, many of them have speakers that come out of the back of the device, which makes sound bounce off the wall behind it, then go into the room. Sound bars can allow the sound to go straight in your direction and can improve sound quality. -- Consider ear level devices to help improve the volume and/or sound quality of the program.  There are devices that work like headphones that you can adjust the volume for your ears while others can have the volume at a more comfortable level, such as \"TV Ears\". Most hearing aids have devices that allow them to connect directly to the TV and improve sound quality. Hearing Loss: Care Instructions  Your Care Instructions      Hearing loss is a sudden or slow decrease in how well you hear. It can range from mild to profound. Permanent hearing loss can occur with aging, and it can happen when you are exposed long-term to loud noise. Examples include listening to loud music, riding motorcycles, or being around other loud machines. Hearing loss can affect your work and home life. It can make you feel lonely or depressed. You may feel that you have lost your independence. But hearing aids and other devices can help you hear better and feel connected to others. Follow-up care is a key part of your treatment and safety. Be sure to make and go to all appointments, and call your doctor if you are having problems. It's also a good idea to know your test results and keep a list of the medicines you take. How can you care for yourself at home? · Avoid loud noises whenever possible. This helps keep your hearing from getting worse. Always wear hearing protection around loud noises. · If appropriate, wear hearing aid(s) as directed.   It is recommended that hearing aids are worn during all waking hours to keep your brain active and give it access to the sounds it is missing. · If you are beginning your process with hearing aid(s), schedule a \"Hearing Aid Evaluation\" with an audiologist to discuss your lifestyle, features of hearing aid technology, and styles of hearing aids available. It is recommended that you contact your insurance company to determine if you have a hearing aid benefit, as this may dictate who you can see for these services. · Have hearing tests as your doctor suggests. They can show whether your hearing has changed. Your hearing aid may need to be adjusted. · Use other assistive devices as needed. These may include:  ? Telephone amplifiers and hearing aids that can connect to a television, stereo, radio, or microphone. ? Devices that use lights or vibrations. These alert you to the doorbell, a ringing telephone, or a baby monitor. ? Television closed-captioning. This shows the words at the bottom of the screen. Most new TVs can do this. ? TTY (text telephone). This lets you type messages back and forth on the telephone instead of talking or listening. These devices are also called TDD. When messages are typed on the keyboard, they are sent over the phone line to a receiving TTY. The message is shown on a monitor. · Use pagers, fax machines, text, and email if it is hard for you to communicate by telephone. · Try to learn a listening technique called speech-reading. It is not lip-reading. You pay attention to people's gestures, expressions, posture, and tone of voice. These clues can help you understand what a person is saying. Face the person you are talking to, and have him or her face you. Make sure the lighting is good. You need to see the other person's face clearly. · Think about counseling if you need help to adjust to your hearing loss. When should you call for help?   Watch closely for changes in your health, and be sure to contact your doctor if:    · You think your hearing is getting worse. · You have new symptoms, such as dizziness or nausea. Noise-Induced Hearing Loss  What it is, and what you can do to prevent it    Exposure to loud sounds, in an occupational setting or recreational, can cause permanent hearing loss. Sound is measured in decibels (dB). Noise-induced hearing loss is the ONLY type of preventable hearing loss. Hearing loss related to noise exposure can occur at any age. There are small sensory cells, called inner and outer hair cells, within the inner ear (cochlea). These cells process the loudness (intensity) and pitch (frequency) of sound and send the signal to the brain via our auditory nerve (vestibulocochlear nerve, cranial nerve VIII). When these cells are damaged, they can result in permanent hearing loss and/or tinnitus. The hair cells responsible for high frequency sounds, like birds chirping, are most likely to be damaged due to loud sounds. The high frequency sounds are also very important for our clarity and understanding of speech. OCCUPATIONAL NOISE EXPOSURE RECREATIONAL NOISE EXPOSURE   Some jobs may have exposure to loud sounds in the workplace. These jobs may include but are not limited to:  [employer]   Welding   Landscaping   Hairdressing/hairstyling   [employer]    ... And more! Many activities outside of work may cause permanent hearing loss. These activities may include but are not limited to:  Lawnmowers, leaf blowers  [employer] (such as pigs squealing)   Chainsaws and other power tools  [employer] musical instruments and/or singing   Listening to music too loudly - at concerts, through stereo, through ear buds or headphones   Attending sporting events   Attending fireworks shows or using fireworks at home  [employer] of firearms   . .. And more!        REDUCE OR PROTECT YOUR EARS FROM NOISE EXPOSURE    To do your best to avoid noise-induced hearing loss, here are some tips:   Limit exposure to loud sounds. 85 dB (decibels) is safe for 8 hours. As sounds are louder, the length of time the sound is safe lessens. These numbers are cumulative across a 24-hour period. (NIOSH and CDC, 2002)  o 85 dB is safe for 8 hours  o 88 dB is safe for 4 hours  o 91 dB is safe for 2 hours  o 94 dB is safe for 1 hour  o 97 dB is safe for 30 minutes  o 100 dB is safe for 15 minutes  o 103 dB is safe for 7.5 minutes  o 106 dB is safe for 3.75 minutes  o 109 dB is safe for LESS THAN 2 minutes  o 112 dB is safe for LESS THAN 1 minute  o 115 dB is safe for ~ 30 seconds  o 130 dB can cause IMMEDIATE hearing loss   If you are unsure if a sound is too loud, consider checking the sound level with a \"sound level meter\". There are apps on smart devices, such as \"Decibel X\", that can measure the loudness of the sound. They are not as accurate as expensive equipment used by scientists, but it will give you a guesstimate of how loud the sound is, and if it may be damaging to your hearing.  If you cannot avoid loud sounds, here are ways to reduce your exposure:  o 1. Wear hearing protection  - Ear plugs and protective ear muffs can be used to reduce the intensity of the sound. The higher the NRR (noise reduction rating), the better reduction of the intensity of the sound   o 2. Turn the volume down  - When listening to music, turn the volume down, especially when wearing ear buds or headphones. A good rule of thumb is to not go beyond the middle setting on your device. If you can't hear someone talking to you from arm's length away, your music may be at a level that it can cause damage. If someone else can hear your music from 3 feet away, it may also be at a level that it can cause damage.   o 3. Walk away from the sound  - If you do not have the ability to wear hearing protection or turn down the volume of the sound, you should do your best to move away from the source of the sound. - Sound decreases in intensity as we move further from the source. The sound will decrease by 6 dB for every doubling of distance from the sound source. TYPES OF HEARING PROTECTION    The most common types of hearing protection are protective ear muffs and ear plugs. Protective ear muffs are commonly found at home improvement or sporting good stores, they can be worn time and time again and are great if you need to take your hearing protection off frequently. Ear plugs are often made of foam or soft silicone. The foam ones are designed for one-time use, while silicone ear plugs may be used multiple times. There are also \"filtered\" ear plugs that help provide even attenuation of the sound across all frequencies. These are great for listening to music or going to concerts, and allow for better understanding of speech in louder environments. They can be purchased at music stores or online retailers (search \"Ety Plugs\" or \"filtered ear plugs\"), or custom earmolds can be made with an audiologist.    There are \"custom\" hearing protection devices that you can further discuss with your audiologist based on your specific needs, if desired. Exposure to these sounds may cause permanent damage to your hearing.   If you suspect your hearing has changed, it is recommended that you have your hearing tested by your audiologist.

## 2022-02-28 ENCOUNTER — TELEPHONE (OUTPATIENT)
Dept: ORTHOPEDIC SURGERY | Age: 52
End: 2022-02-28

## 2022-02-28 NOTE — TELEPHONE ENCOUNTER
Isabella SAEZ called from [employer] regarding a PA for Qsymia 15-92MG capsules. (Patient had called [employer] and started the PA). Laly King and I completed the PA on the phone, Reference number [id number]. APPROVED today from 02/28/2022 through 05/23/2022.

## 2022-03-03 ENCOUNTER — TELEPHONE (OUTPATIENT)
Dept: FAMILY MEDICINE CLINIC | Age: 52
End: 2022-03-03

## 2022-03-03 NOTE — TELEPHONE ENCOUNTER
Prior Authorization for medication Qsymia 15 MG-92 MG has been APPROVED by insurance [employer] for time period 02/28/2022-05/23/2022.  Please see attached scanned approval letter for more information

## 2022-03-13 NOTE — PROGRESS NOTES
Subjective:      Patient ID: Geoff Calix 46 y.o. female. The primary encounter diagnosis was Essential hypertension. Diagnoses of Class 2 severe obesity due to excess calories with serious comorbidity and body mass index (BMI) of 35.0 to 35.9 in [facility]) and Obstructive sleep apnea syndrome were also pertinent to this visit. HPI    DUE SHINGRIX - declines today. Will have eventually. YORDAN; sees Dr. Irais Flores. Using CPAP. Obesity:  Managed with Qsymia, diet, exercise. At last appt wt was down 45 lbs from max. She keeps med secure and takes only as dirtected. Is eating well, exercising regularly. occ has low sugar rxn, feels shaky. May occur if waits too long too eat. Sleeps well. No palpitations, irritability related to Qsymia. Is going on a cruise. Requests dramamine. Hypertension: Patient here for follow-up of elevated blood pressure. She is exercising and is adherent to low salt diet. Blood pressure is not testing at home. Cardiac symptoms none. Patient denies chest pressure/discomfort, dyspnea and palpitations. Cardiovascular risk factors: none. Use of agents associated with hypertension: none. History of target organ damage: none.      Lab Results   Component Value Date     12/22/2021    K 3.6 12/22/2021     12/22/2021    CO2 21 12/22/2021    BUN 18 12/22/2021    CREATININE 0.7 12/22/2021    GLUCOSE 109 (H) 12/22/2021    CALCIUM 9.2 12/22/2021    PROT 6.8 12/22/2021    LABALBU 4.4 12/22/2021    BILITOT 0.5 12/22/2021    ALKPHOS 60 12/22/2021    AST 18 12/22/2021    ALT 24 12/22/2021    LABGLOM >60 12/22/2021    GFRAA >60 12/22/2021    AGRATIO 1.8 12/22/2021    GLOB 2.2 05/14/2021        Lab Results   Component Value Date    CHOL 166 12/22/2021    CHOL 148 12/19/2020    CHOL 150 12/27/2019     Lab Results   Component Value Date    TRIG 144 12/22/2021    TRIG 128 12/19/2020    TRIG 145 12/27/2019     Lab Results   Component Value Date    HDL 47 12/22/2021 HDL 45 12/19/2020    HDL 45 12/27/2019     Lab Results   Component Value Date    LDLCALC 90 12/22/2021    LDLCALC 77 12/19/2020    LDLCALC 76 12/27/2019     Lab Results   Component Value Date    LABVLDL 29 12/22/2021    LABVLDL 26 12/19/2020    LABVLDL 29 12/27/2019     No results found for: [facility]   Lab Results   Component Value Date    LABA1C 5.7 12/22/2021     Lab Results   Component Value Date    .9 12/22/2021          Outpatient Medications Marked as Taking for the 3/14/22 encounter (Office Visit) with Saira Santiago MD   Medication Sig Dispense Refill    meloxicam (MOBIC) 15 MG tablet Take 1 tablet by mouth daily 90 tablet 2    Phentermine-Topiramate (QSYMIA) 15-92 MG CP24 Take 1 capsule by mouth daily for 90 days.  30 capsule 2    naratriptan (AMERGE) 2.5 MG tablet 2.5 mg at onset of headache, may repeat in 4 hours if needed 12 tablet 5    polyethylene glycol (MIRALAX) 17 GM/SCOOP powder Take 17 g by mouth daily 510 g 0    atorvastatin (LIPITOR) 20 MG tablet Take 1 tablet by mouth daily 90 tablet 2    omeprazole (PRILOSEC) 40 MG delayed release capsule Take 1 capsule by mouth daily 90 capsule 2    valsartan-hydroCHLOROthiazide (DIOVAN-HCT) 160-25 MG per tablet Take 1 tablet by mouth daily 90 tablet 2    Cholecalciferol (VITAMIN D) 50 MCG (2000 UT) CAPS capsule Take by mouth      loratadine (CLARITIN) 10 MG tablet Take 10 mg by mouth daily          Allergies   Allergen Reactions    Latex     Bactrim [Sulfamethoxazole-Trimethoprim] Rash       Patient Active Problem List   Diagnosis    Lymphedema of left lower extremity    FH: hemochromatosis    Essential hypertension    Pure hypercholesterolemia    Gastroesophageal reflux disease    Vitamin D deficiency    Hypertensive left ventricular hypertrophy, without heart failure    Class 2 severe obesity due to excess calories with serious comorbidity and body mass index (BMI) of 35.0 to 35.9 in adult (HCC)    Obstructive sleep apnea syndrome       Past Medical History:   Diagnosis Date    Hypertension     Lymphedema     Obstructive sleep apnea syndrome 2022       Past Surgical History:   Procedure Laterality Date    BLADDER SURGERY  2009    bladder suspension     SECTION      x2    ECTOPIC PREGNANCY SURGERY      LYMPH NODE DISSECTION Left 1979    removal of lymph node removal left groin    OTHER SURGICAL HISTORY      septum removal in uterus        Family History   Problem Relation Age of Onset    Hypertension Mother     Thyroid Disease Mother     Breast Cancer Sister 43    Thyroid Disease Sister         Grave's disease    Hemochromatosis Maternal Grandmother     Diabetes Paternal Grandmother        Social History     Tobacco Use    Smoking status: Former Smoker     Packs/day: 1.00     Years: 8.00     Pack years: 8.00     Quit date: 2002     Years since quittin.4    Smokeless tobacco: Never Used   Vaping Use    Vaping Use: Never used   Substance Use Topics    Alcohol use: Yes     Alcohol/week: 1.0 standard drink     Types: 1 Glasses of wine per week     Comment: 2 on the weekends occasionally    Drug use: No            Review of Systems  Review of Systems    Objective:   Physical Exam  There were no vitals filed for this visit. Wt Readings from Last 3 Encounters:   22 236 lb 12.8 oz (107.4 kg)   22 237 lb (107.5 kg)   21 237 lb 9.6 oz (107.8 kg)      Vitals:    22 1303   BP: 114/82   Pulse: 78   Resp: 14   Temp: 97.4 °F (36.3 °C)   TempSrc: Temporal   SpO2: 98%   Weight: 227 lb 9.6 oz (103.2 kg)   Height: 5' 11\" (1.803 m)      Physical Exam  NAD    Skin is warm and dry. No rash. Well hydrated  Alert and oriented x 3. Mood and affect are normal.  The neck is supple and free of adenopathy or masses, the thyroid is normal without enlargement or nodules. Chest: clear with no wheezes or rales. No retractions, or use of accessory muscles noted.     Cardiovascular: PMI is not displaced, and no thrill noted. Regular rate and rhythm with no rub, murmur or gallop. No peripheral edema. The abdomen is soft without tenderness, guarding, mass, rebound or organomegaly. Aorta, femoral, DP and PT pulses intact. Assessment:       Diagnosis Orders   1. Essential hypertension  At goal < 130/80  Continue Diovan HCTZ    2. Class 2 severe obesity due to excess calories with serious comorbidity and body mass index (BMI) of 35.0 to 35.9 in adult (HCC)  Phentermine-Topiramate (QSYMIA) 15-92 MG CP24  Doing great. Discussed diet, exercise and weight loss strategies    3. Obstructive sleep apnea syndrome  Continue CPAP   4. Motion sickness, initial encounter  scopolamine (TRANSDERM-SCOP, 1.5 MG,) transdermal patch          Plan:      Side effects of current medications reviewed and questions answered. Follow up in 3 months or prn.

## 2022-03-14 ENCOUNTER — OFFICE VISIT (OUTPATIENT)
Dept: FAMILY MEDICINE CLINIC | Age: 52
End: 2022-03-14
Payer: COMMERCIAL

## 2022-03-14 VITALS
HEART RATE: 78 BPM | WEIGHT: 227.6 LBS | SYSTOLIC BLOOD PRESSURE: 114 MMHG | HEIGHT: 71 IN | OXYGEN SATURATION: 98 % | RESPIRATION RATE: 14 BRPM | TEMPERATURE: 97.4 F | BODY MASS INDEX: 31.86 KG/M2 | DIASTOLIC BLOOD PRESSURE: 82 MMHG

## 2022-03-14 DIAGNOSIS — I10 ESSENTIAL HYPERTENSION: Primary | ICD-10-CM

## 2022-03-14 DIAGNOSIS — G47.33 OBSTRUCTIVE SLEEP APNEA SYNDROME: ICD-10-CM

## 2022-03-14 DIAGNOSIS — E66.01 CLASS 2 SEVERE OBESITY DUE TO EXCESS CALORIES WITH SERIOUS COMORBIDITY AND BODY MASS INDEX (BMI) OF 35.0 TO 35.9 IN ADULT (HCC): ICD-10-CM

## 2022-03-14 DIAGNOSIS — T75.3XXA MOTION SICKNESS, INITIAL ENCOUNTER: ICD-10-CM

## 2022-03-14 PROCEDURE — 99214 OFFICE O/P EST MOD 30 MIN: CPT | Performed by: FAMILY MEDICINE

## 2022-03-14 RX ORDER — SCOLOPAMINE TRANSDERMAL SYSTEM 1 MG/1
1 PATCH, EXTENDED RELEASE TRANSDERMAL
Qty: 4 PATCH | Refills: 0 | Status: SHIPPED | OUTPATIENT
Start: 2022-03-14 | End: 2022-03-26

## 2022-03-14 RX ORDER — PHENTERMINE AND TOPIRAMATE 15; 92 MG/1; MG/1
1 CAPSULE, EXTENDED RELEASE ORAL DAILY
Qty: 30 CAPSULE | Refills: 2 | Status: SHIPPED | OUTPATIENT
Start: 2022-03-25 | End: 2022-06-20 | Stop reason: SDUPTHER

## 2022-03-14 ASSESSMENT — PATIENT HEALTH QUESTIONNAIRE - PHQ9
SUM OF ALL RESPONSES TO PHQ9 QUESTIONS 1 & 2: 0
SUM OF ALL RESPONSES TO PHQ QUESTIONS 1-9: 0
2. FEELING DOWN, DEPRESSED OR HOPELESS: 0
1. LITTLE INTEREST OR PLEASURE IN DOING THINGS: 0
SUM OF ALL RESPONSES TO PHQ QUESTIONS 1-9: 0

## 2022-03-23 RX ORDER — OMEPRAZOLE 40 MG/1
40 CAPSULE, DELAYED RELEASE ORAL DAILY
Qty: 90 CAPSULE | Refills: 2 | Status: SHIPPED | OUTPATIENT
Start: 2022-03-23 | End: 2022-09-19 | Stop reason: SDUPTHER

## 2022-03-23 NOTE — TELEPHONE ENCOUNTER
Requested Prescriptions     Pending Prescriptions Disp Refills    omeprazole (PRILOSEC) 40 MG delayed release capsule 90 capsule 2     Sig: Take 1 capsule by mouth daily     Last ov 3/14/22  Last lab 12/22/21  Next ov 6/20/22

## 2022-04-06 RX ORDER — OMEPRAZOLE 40 MG/1
40 CAPSULE, DELAYED RELEASE ORAL DAILY
Qty: 90 CAPSULE | Refills: 2 | OUTPATIENT
Start: 2022-04-06

## 2022-05-24 ENCOUNTER — PATIENT MESSAGE (OUTPATIENT)
Dept: FAMILY MEDICINE CLINIC | Age: 52
End: 2022-05-24

## 2022-05-24 DIAGNOSIS — E66.01 CLASS 2 SEVERE OBESITY DUE TO EXCESS CALORIES WITH SERIOUS COMORBIDITY AND BODY MASS INDEX (BMI) OF 35.0 TO 35.9 IN ADULT (HCC): ICD-10-CM

## 2022-05-24 NOTE — TELEPHONE ENCOUNTER
From: Carolin Bhakta  To: Dr. Nav Reyes: 2022 4:22 PM EDT  Subject: Need a PA     I just went to put a refill for on my qsymia 15mg/ 92 mg cap which I believe is due the 26th of May 2022. They reminded me that I needed a new PA . It  the 21 th of May 2022. I do have an apt schedule with dr Sandra Campbell on 2022. Somehow I miscalculated the refill dates with my vacation and needing a PA before seeing her. If she wants to see me before  Im available. Thank you.   Ila Mata

## 2022-05-25 ENCOUNTER — TELEPHONE (OUTPATIENT)
Dept: ADMINISTRATIVE | Age: 52
End: 2022-05-25

## 2022-05-25 RX ORDER — PHENTERMINE AND TOPIRAMATE 15; 92 MG/1; MG/1
1 CAPSULE, EXTENDED RELEASE ORAL DAILY
Qty: 30 CAPSULE | Refills: 2 | Status: CANCELLED | OUTPATIENT
Start: 2022-05-25 | End: 2022-08-23

## 2022-05-25 NOTE — TELEPHONE ENCOUNTER
Submitted PA for Qsymia 15-92MG er capsules Rx #: [id number] Via CMM Key: [id number] STATUS: APPROVED effective 5/25/2022-8/17/2022    Please notify patient.  Thank you

## 2022-05-25 NOTE — TELEPHONE ENCOUNTER
Requested Prescriptions     Pending Prescriptions Disp Refills    Phentermine-Topiramate (QSYMIA) 15-92 MG CP24 30 capsule 2     Sig: Take 1 capsule by mouth daily for 90 days.      Please start a PA for this medication    Thank you

## 2022-06-19 NOTE — PROGRESS NOTES
Subjective:      Patient ID: Augustine Dumont 46 y.o. female. The primary encounter diagnosis was Class 2 severe obesity due to excess calories with serious comorbidity and body mass index (BMI) of 35.0 to 35.9 in adult [facility]). Diagnoses of Essential hypertension, Hypertensive left ventricular hypertrophy, without heart failure, Obstructive sleep apnea syndrome, Need for Tdap vaccination, and Need for shingles vaccine were also pertinent to this visit. HPI  DUE shingles and tdap vaccines. Wants to delay Shingrix due to risk side effects. Will come in on a Friday to have it. Ila is here for follow up for obesity with YORDAN, HTN and hypertensive ventricular hypertrophy. She is using Qsymia along with diet and exercise to achieve a healthier weight. 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  She notes the medication helps her to not think about food, limit her intake. Diet: making good choices. Exercise: daily. Weight trend: stable. Side effects:  Denies insomnia, anxiety, irritability, palpitations. She does not mild     Menses: amenorrhea since March.   Mild night sweats,     Lab Results   Component Value Date     12/22/2021    K 3.6 12/22/2021     12/22/2021    CO2 21 12/22/2021    BUN 18 12/22/2021    CREATININE 0.7 12/22/2021    GLUCOSE 109 (H) 12/22/2021    CALCIUM 9.2 12/22/2021    PROT 6.8 12/22/2021    LABALBU 4.4 12/22/2021    BILITOT 0.5 12/22/2021    ALKPHOS 60 12/22/2021    AST 18 12/22/2021    ALT 24 12/22/2021    LABGLOM >60 12/22/2021    GFRAA >60 12/22/2021    AGRATIO 1.8 12/22/2021    GLOB 2.2 05/14/2021        TSH   Date Value Ref Range Status   12/22/2021 2.23 0.27 - 4.20 uIU/mL Final   12/19/2020 1.84 0.27 - 4.20 uIU/mL Final   03/06/2019 2.46 0.27 - 4.20 uIU/mL Final   11/23/2018 2.32 0.27 - 4.20 uIU/mL Final     Lab Results   Component Value Date    CHOL 166 12/22/2021    CHOL 148 12/19/2020    CHOL 150 12/27/2019     Lab Results   Component Value Date    TRIG 144 12/22/2021    TRIG 128 12/19/2020    TRIG 145 12/27/2019     Lab Results   Component Value Date    HDL 47 12/22/2021    HDL 45 12/19/2020    HDL 45 12/27/2019     Lab Results   Component Value Date    LDLCALC 90 12/22/2021    LDLCALC 77 12/19/2020    LDLCALC 76 12/27/2019     Lab Results   Component Value Date    LABVLDL 29 12/22/2021    LABVLDL 26 12/19/2020    LABVLDL 29 12/27/2019     No results found for: CHOLHDLRATIO     Outpatient Medications Marked as Taking for the 6/20/22 encounter (Office Visit) with Erika Montelongo MD   Medication Sig Dispense Refill    Pilocarpine HCl (VUITY) 1.25 % SOLN Apply to eye      omeprazole (PRILOSEC) 40 MG delayed release capsule Take 1 capsule by mouth daily 90 capsule 2    Phentermine-Topiramate (QSYMIA) 15-92 MG CP24 Take 1 capsule by mouth daily for 90 days.  30 capsule 2    meloxicam (MOBIC) 15 MG tablet Take 1 tablet by mouth daily 90 tablet 2    naratriptan (AMERGE) 2.5 MG tablet 2.5 mg at onset of headache, may repeat in 4 hours if needed 12 tablet 5    polyethylene glycol (MIRALAX) 17 GM/SCOOP powder Take 17 g by mouth daily 510 g 0    atorvastatin (LIPITOR) 20 MG tablet Take 1 tablet by mouth daily 90 tablet 2    Multiple Vitamins-Minerals (HAIR SKIN AND NAILS FORMULA PO) Take by mouth daily TAKE THREE TIMES DAILY       valsartan-hydroCHLOROthiazide (DIOVAN-HCT) 160-25 MG per tablet Take 1 tablet by mouth daily 90 tablet 2    Cholecalciferol (VITAMIN D) 50 MCG (2000 UT) CAPS capsule Take by mouth      loratadine (CLARITIN) 10 MG tablet Take 10 mg by mouth daily          Allergies   Allergen Reactions    Latex     Bactrim [Sulfamethoxazole-Trimethoprim] Rash       Patient Active Problem List   Diagnosis    Lymphedema of left lower extremity    FH: hemochromatosis    Essential hypertension    Pure hypercholesterolemia    Gastroesophageal reflux disease    Vitamin D deficiency    Hypertensive left ventricular hypertrophy, without heart failure    Class 2 severe obesity due to excess calories with serious comorbidity and body mass index (BMI) of 35.0 to 35.9 in adult ([facility] [address]    Obstructive sleep apnea syndrome       Past Medical History:   Diagnosis Date    Hypertension     Lymphedema     Obstructive sleep apnea syndrome 2022       Past Surgical History:   Procedure Laterality Date    BLADDER SURGERY  2009    bladder suspension     SECTION      x2    ECTOPIC PREGNANCY SURGERY      LYMPH NODE DISSECTION Left 1979    removal of lymph node removal left groin    OTHER SURGICAL HISTORY      septum removal in uterus        Family History   Problem Relation Age of Onset    Hypertension Mother     Thyroid Disease Mother     Breast Cancer Sister 43    Thyroid Disease Sister         Grave's disease    Hemochromatosis Maternal Grandmother     Diabetes Paternal Grandmother        Social History     Tobacco Use    Smoking status: Former Smoker     Packs/day: 1.00     Years: 8.00     Pack years: 8.00     Quit date: 2002     Years since quittin.7    Smokeless tobacco: Never Used   Vaping Use    Vaping Use: Never used   Substance Use Topics    Alcohol use: Yes     Alcohol/week: 1.0 standard drink     Types: 1 Glasses of wine per week     Comment: 2 on the weekends occasionally    Drug use: No            Review of Systems  Review of Systems  . Objective:   Physical Exam  There were no vitals filed for this visit.   Wt Readings from Last 3 Encounters:   22 227 lb (103 kg)   22 227 lb 9.6 oz (103.2 kg)   22 236 lb 12.8 oz (107.4 kg)     Temp Readings from Last 3 Encounters:   22 97.4 °F (36.3 °C) (Temporal)   22 97.5 °F (36.4 °C) (Temporal)   21 97.9 °F (36.6 °C) (Temporal)     BP Readings from Last 3 Encounters:   22 122/70   22 114/82   22 124/80     Pulse Readings from Last 3 Encounters: 06/20/22 93   03/14/22 78   01/19/22 78      Physical Exam  NAD    Skin is warm and dry. No rash. Well hydrated  Alert and oriented x 3. Mood and affect are normal.  The neck is supple and free of adenopathy or masses, the thyroid is normal without enlargement or nodules. Chest: clear with no wheezes or rales. No retractions, or use of accessory muscles noted. Cardiovascular: PMI is not displaced, and no thrill noted. Regular rate and rhythm with no rub, murmur or gallop. No peripheral edema. .         Assessment:       Diagnosis Orders   1. Class 2 severe obesity due to excess calories with serious comorbidity and body mass index (BMI) of 35.0 to 35.9 in adult (HCC)  Phentermine-Topiramate (QSYMIA) 15-92 MG CP24  Discussed diet, exercise and weight loss strategies   PDMP reviewed and no concerns noted. 2. Essential hypertension  At goal < 130/80  Continue current rx. 3. Hypertensive left ventricular hypertrophy, without heart failure  Continue BP control. 4. Need for Tdap vaccination  Boostrix   5. Need for shingles vaccine  Advised. Plan:      Side effects of current medications reviewed and questions answered. Follow up in 3 months or prn.

## 2022-06-20 ENCOUNTER — OFFICE VISIT (OUTPATIENT)
Dept: FAMILY MEDICINE CLINIC | Age: 52
End: 2022-06-20
Payer: COMMERCIAL

## 2022-06-20 VITALS
SYSTOLIC BLOOD PRESSURE: 122 MMHG | WEIGHT: 227 LBS | BODY MASS INDEX: 31.78 KG/M2 | HEART RATE: 93 BPM | OXYGEN SATURATION: 99 % | HEIGHT: 71 IN | DIASTOLIC BLOOD PRESSURE: 70 MMHG

## 2022-06-20 DIAGNOSIS — Z23 NEED FOR TDAP VACCINATION: ICD-10-CM

## 2022-06-20 DIAGNOSIS — Z23 NEED FOR SHINGLES VACCINE: ICD-10-CM

## 2022-06-20 DIAGNOSIS — I11.9 HYPERTENSIVE LEFT VENTRICULAR HYPERTROPHY, WITHOUT HEART FAILURE: ICD-10-CM

## 2022-06-20 DIAGNOSIS — I10 ESSENTIAL HYPERTENSION: ICD-10-CM

## 2022-06-20 DIAGNOSIS — E66.01 CLASS 2 SEVERE OBESITY DUE TO EXCESS CALORIES WITH SERIOUS COMORBIDITY AND BODY MASS INDEX (BMI) OF 35.0 TO 35.9 IN ADULT (HCC): Primary | ICD-10-CM

## 2022-06-20 PROCEDURE — 99214 OFFICE O/P EST MOD 30 MIN: CPT | Performed by: FAMILY MEDICINE

## 2022-06-20 PROCEDURE — 90471 IMMUNIZATION ADMIN: CPT | Performed by: FAMILY MEDICINE

## 2022-06-20 PROCEDURE — 90715 TDAP VACCINE 7 YRS/> IM: CPT | Performed by: FAMILY MEDICINE

## 2022-06-20 RX ORDER — PHENTERMINE AND TOPIRAMATE 15; 92 MG/1; MG/1
1 CAPSULE, EXTENDED RELEASE ORAL DAILY
Qty: 30 CAPSULE | Refills: 2 | Status: SHIPPED | OUTPATIENT
Start: 2022-06-25 | End: 2022-09-19 | Stop reason: SDUPTHER

## 2022-06-20 RX ORDER — PILOCARPINE HYDROCHLORIDE 12.5 MG/ML
SOLUTION/ DROPS OPHTHALMIC
COMMUNITY

## 2022-06-26 DIAGNOSIS — I10 ESSENTIAL HYPERTENSION: ICD-10-CM

## 2022-06-27 RX ORDER — VALSARTAN AND HYDROCHLOROTHIAZIDE 160; 25 MG/1; MG/1
1 TABLET ORAL DAILY
Qty: 90 TABLET | Refills: 2 | Status: SHIPPED | OUTPATIENT
Start: 2022-06-27 | End: 2022-10-20 | Stop reason: SDUPTHER

## 2022-06-27 NOTE — TELEPHONE ENCOUNTER
Requested Prescriptions     Pending Prescriptions Disp Refills    valsartan-hydroCHLOROthiazide (DIOVAN-HCT) 160-25 MG per tablet 90 tablet 2     Sig: Take 1 tablet by mouth daily     Last OV- 6/20/2022  Labs- 12/22/21  NFOV

## 2022-09-08 ENCOUNTER — TELEPHONE (OUTPATIENT)
Dept: FAMILY MEDICINE CLINIC | Age: 52
End: 2022-09-08

## 2022-09-09 NOTE — TELEPHONE ENCOUNTER
Submitted PA for Qsymia 15-92MG er capsules. Key: [id number]. Via CM STATUS: APPROVED 9/9/2022 12:00:00 AM, Coverage Ends on: 12/2/2022. Will scan letter when received. If this requires a response please respond to the pool ( [facility] [address]). Thank you please advise patient.

## 2022-09-17 NOTE — PROGRESS NOTES
Subjective:      Patient ID: Hermilo Galvan 46 y.o. female. The primary encounter diagnosis was Class 2 severe obesity due to excess calories with serious comorbidity and body mass index (BMI) of 35.0 to 35.9 in adult [facility]). Diagnoses of Obstructive sleep apnea syndrome, Essential hypertension, Encounter for screening mammogram for malignant neoplasm of breast, and Need for vaccination were also pertinent to this visit. HPI    DUE: Shingrix, flu vaccine, COVID booster and mammogram.  Would like to have flu vaccine today; wants to delay Shingrix for now    Had COVID in July. Had strep throat last week. Was treated with Amoxil. She feels she has recovered from [place]. Obesity: managed with Qsymia, diet and exercise. Complications of obesity include YORDAN and HTN. PDMP reviewed and no concerns noted. Last filled Qsymia on 9/9/22  Diet:  eating well   Exercise: gym every day when not sick  Weight: down another 5 lbs. Was down a few more lbs but gained some weight when on steroids for strep. Side effect: denies palpitations, anxiety, insomnia, irritability. Constipation: better with improved diet. GERD: controlled with Omeprazole. She has not tried to wean but is welling to do so. Hypertension: Patient here for follow-up of elevated blood pressure. She is exercising and is adherent to low salt diet. Blood pressure is well controlled at home. Cardiac symptoms none. Patient denies chest pressure/discomfort, dyspnea, and palpitations. Cardiovascular risk factors: hypertension and obesity (BMI >= 30 kg/m2). Use of agents associated with hypertension: amphetamines. History of target organ damage: none. YORDAN: Is compliant with CPAP. Last appt with pulmonology 1/2022.      Labs Renal Latest Ref Rng & Units 12/22/2021 5/14/2021 12/19/2020 12/27/2019 10/3/2019   BUN 7 - 20 mg/dL 18 21(H) 16 16 15   Cr 0.6 - 1.1 mg/dL 0.7 0.6 0.6 0.7 0.7   K 3.5 - 5.1 mmol/L 3.6 4.3 4.1 3.6 3.8   Na 136 - 145 mmol/L 137 138 137 136 139     Lab Results   Component Value Date/Time     12/22/2021 08:13 AM    K 3.6 12/22/2021 08:13 AM     12/22/2021 08:13 AM    CO2 21 12/22/2021 08:13 AM    BUN 18 12/22/2021 08:13 AM    CREATININE 0.7 12/22/2021 08:13 AM    GLUCOSE 109 12/22/2021 08:13 AM    CALCIUM 9.2 12/22/2021 08:13 AM       TSH   Date Value Ref Range Status   12/22/2021 2.23 0.27 - 4.20 uIU/mL Final   12/19/2020 1.84 0.27 - 4.20 uIU/mL Final   03/06/2019 2.46 0.27 - 4.20 uIU/mL Final   11/23/2018 2.32 0.27 - 4.20 uIU/mL Final     Lab Results   Component Value Date    WBC 8.2 05/14/2021    HGB 15.8 05/14/2021    HCT 45.9 05/14/2021    MCV 92.7 05/14/2021     05/14/2021     Lab Results   Component Value Date    CHOL 166 12/22/2021    CHOL 148 12/19/2020    CHOL 150 12/27/2019     Lab Results   Component Value Date    TRIG 144 12/22/2021    TRIG 128 12/19/2020    TRIG 145 12/27/2019     Lab Results   Component Value Date    HDL 47 12/22/2021    HDL 45 12/19/2020    HDL 45 12/27/2019     Lab Results   Component Value Date    LDLCALC 90 12/22/2021    LDLCALC 77 12/19/2020    LDLCALC 76 12/27/2019     Lab Results   Component Value Date    LABVLDL 29 12/22/2021    LABVLDL 26 12/19/2020    LABVLDL 29 12/27/2019     No results found for: CHOLHDLRATIO     Outpatient Medications Marked as Taking for the 9/19/22 encounter (Office Visit) with Reginaldo Alfredo MD   Medication Sig Dispense Refill    valsartan-hydroCHLOROthiazide (DIOVAN-HCT) 160-25 MG per tablet Take 1 tablet by mouth daily 90 tablet 2    Pilocarpine HCl (VUITY) 1.25 % SOLN Apply to eye      Phentermine-Topiramate (QSYMIA) 15-92 MG CP24 Take 1 capsule by mouth daily for 90 days.  30 capsule 2    omeprazole (PRILOSEC) 40 MG delayed release capsule Take 1 capsule by mouth daily 90 capsule 2    meloxicam (MOBIC) 15 MG tablet Take 1 tablet by mouth daily 90 tablet 2    naratriptan (AMERGE) 2.5 MG tablet 2.5 mg at onset of headache, may repeat Substance Use Topics    Alcohol use: Yes     Alcohol/week: 1.0 standard drink     Types: 1 Glasses of wine per week     Comment: 2 on the weekends occasionally    Drug use: No            Review of Systems  Review of Systems    Objective:   Physical Exam  There were no vitals filed for this visit. Wt Readings from Last 3 Encounters:   09/19/22 222 lb (100.7 kg)   06/20/22 227 lb (103 kg)   03/14/22 227 lb 9.6 oz (103.2 kg)    7/23/21   262 lbs. Her max at home was 278. Vitals:    09/19/22 0820   BP: 128/68   Pulse: 94   Resp: 14   Temp: 97.9 °F (36.6 °C)   TempSrc: Temporal   SpO2: 98%   Weight: 222 lb (100.7 kg)      Physical Exam  NAD    Skin is warm and dry. No rash. Well hydrated  Alert and oriented x 3. Mood and affect are normal.  The neck is supple and free of adenopathy or masses, the thyroid is normal without enlargement or nodules. Chest: clear with no wheezes or rales. No retractions, or use of accessory muscles noted. Cardiovascular: PMI is not displaced, and no thrill noted. Regular rate and rhythm with no rub, murmur or gallop. No peripheral edema. Assessment:       Diagnosis Orders   1. Class 2 severe obesity due to excess calories with serious comorbidity and body mass index (BMI) of 35.0 to 35.9 in adult (HCC)  Phentermine-Topiramate (QSYMIA) 15-92 MG CP24  Doing well. Continue diet and exercise      2. Obstructive sleep apnea syndrome        3. Essential hypertension  At goal < 130/80  Continue current rx. 4. Encounter for screening mammogram for malignant neoplasm of breast  JUDITH DIGITAL SCREEN W OR WO CAD BILATERAL      5. Need for vaccination  Flu vaccine  COVID booster 3 to 6 months after infection      6. Drug-induced constipation  Well cotrolled             Plan:      Side effects of current medications reviewed and questions answered. Follow up in 3 months or prn.

## 2022-09-19 ENCOUNTER — OFFICE VISIT (OUTPATIENT)
Dept: FAMILY MEDICINE CLINIC | Age: 52
End: 2022-09-19
Payer: COMMERCIAL

## 2022-09-19 VITALS
TEMPERATURE: 97.9 F | RESPIRATION RATE: 14 BRPM | OXYGEN SATURATION: 98 % | HEART RATE: 94 BPM | WEIGHT: 222 LBS | SYSTOLIC BLOOD PRESSURE: 128 MMHG | BODY MASS INDEX: 30.96 KG/M2 | DIASTOLIC BLOOD PRESSURE: 68 MMHG

## 2022-09-19 DIAGNOSIS — G47.33 OBSTRUCTIVE SLEEP APNEA SYNDROME: ICD-10-CM

## 2022-09-19 DIAGNOSIS — E66.01 CLASS 2 SEVERE OBESITY DUE TO EXCESS CALORIES WITH SERIOUS COMORBIDITY AND BODY MASS INDEX (BMI) OF 35.0 TO 35.9 IN ADULT (HCC): Primary | ICD-10-CM

## 2022-09-19 DIAGNOSIS — I10 ESSENTIAL HYPERTENSION: ICD-10-CM

## 2022-09-19 DIAGNOSIS — Z23 NEED FOR VACCINATION: ICD-10-CM

## 2022-09-19 DIAGNOSIS — K59.03 DRUG-INDUCED CONSTIPATION: ICD-10-CM

## 2022-09-19 DIAGNOSIS — Z12.31 ENCOUNTER FOR SCREENING MAMMOGRAM FOR MALIGNANT NEOPLASM OF BREAST: ICD-10-CM

## 2022-09-19 PROCEDURE — 99214 OFFICE O/P EST MOD 30 MIN: CPT | Performed by: FAMILY MEDICINE

## 2022-09-19 PROCEDURE — 90471 IMMUNIZATION ADMIN: CPT | Performed by: FAMILY MEDICINE

## 2022-09-19 PROCEDURE — 90674 CCIIV4 VAC NO PRSV 0.5 ML IM: CPT | Performed by: FAMILY MEDICINE

## 2022-09-19 RX ORDER — POLYETHYLENE GLYCOL 3350 17 G/17G
17 POWDER, FOR SOLUTION ORAL DAILY
Qty: 510 G | Refills: 0 | Status: CANCELLED | OUTPATIENT
Start: 2022-09-19

## 2022-09-19 RX ORDER — PHENTERMINE AND TOPIRAMATE 15; 92 MG/1; MG/1
1 CAPSULE, EXTENDED RELEASE ORAL DAILY
Qty: 30 CAPSULE | Refills: 2 | Status: SHIPPED | OUTPATIENT
Start: 2022-09-19 | End: 2022-12-18

## 2022-09-19 RX ORDER — OMEPRAZOLE 40 MG/1
40 CAPSULE, DELAYED RELEASE ORAL DAILY
Qty: 90 CAPSULE | Refills: 3 | Status: SHIPPED | OUTPATIENT
Start: 2022-09-19

## 2022-09-19 SDOH — ECONOMIC STABILITY: FOOD INSECURITY: WITHIN THE PAST 12 MONTHS, THE FOOD YOU BOUGHT JUST DIDN'T LAST AND YOU DIDN'T HAVE MONEY TO GET MORE.: NEVER TRUE

## 2022-09-19 SDOH — ECONOMIC STABILITY: FOOD INSECURITY: WITHIN THE PAST 12 MONTHS, YOU WORRIED THAT YOUR FOOD WOULD RUN OUT BEFORE YOU GOT MONEY TO BUY MORE.: NEVER TRUE

## 2022-09-19 ASSESSMENT — PATIENT HEALTH QUESTIONNAIRE - PHQ9
SUM OF ALL RESPONSES TO PHQ QUESTIONS 1-9: 0
SUM OF ALL RESPONSES TO PHQ QUESTIONS 1-9: 0
2. FEELING DOWN, DEPRESSED OR HOPELESS: 0
SUM OF ALL RESPONSES TO PHQ QUESTIONS 1-9: 0
1. LITTLE INTEREST OR PLEASURE IN DOING THINGS: 0
SUM OF ALL RESPONSES TO PHQ9 QUESTIONS 1 & 2: 0
SUM OF ALL RESPONSES TO PHQ QUESTIONS 1-9: 0

## 2022-09-19 ASSESSMENT — SOCIAL DETERMINANTS OF HEALTH (SDOH): HOW HARD IS IT FOR YOU TO PAY FOR THE VERY BASICS LIKE FOOD, HOUSING, MEDICAL CARE, AND HEATING?: NOT HARD AT ALL

## 2022-10-11 ENCOUNTER — HOSPITAL ENCOUNTER (OUTPATIENT)
Dept: WOMENS IMAGING | Age: 52
Discharge: HOME | End: 2022-10-11
Payer: COMMERCIAL

## 2022-10-11 DIAGNOSIS — Z12.31 ENCOUNTER FOR SCREENING MAMMOGRAM FOR MALIGNANT NEOPLASM OF BREAST: ICD-10-CM

## 2022-10-11 PROCEDURE — 77067 SCR MAMMO BI INCL CAD: CPT

## 2022-10-20 DIAGNOSIS — I10 ESSENTIAL HYPERTENSION: ICD-10-CM

## 2022-10-20 DIAGNOSIS — M75.81 TENDINITIS OF RIGHT ROTATOR CUFF: ICD-10-CM

## 2022-10-20 RX ORDER — VALSARTAN AND HYDROCHLOROTHIAZIDE 160; 25 MG/1; MG/1
1 TABLET ORAL DAILY
Qty: 90 TABLET | Refills: 0 | Status: SHIPPED | OUTPATIENT
Start: 2022-10-20 | End: 2022-11-18 | Stop reason: SDUPTHER

## 2022-10-20 RX ORDER — MELOXICAM 15 MG/1
15 TABLET ORAL DAILY
Qty: 90 TABLET | Refills: 0 | Status: SHIPPED | OUTPATIENT
Start: 2022-10-20 | End: 2022-11-18 | Stop reason: SDUPTHER

## 2022-10-20 NOTE — TELEPHONE ENCOUNTER
Requested Prescriptions     Pending Prescriptions Disp Refills    meloxicam (MOBIC) 15 MG tablet 90 tablet 2     Sig: Take 1 tablet by mouth daily    valsartan-hydroCHLOROthiazide (DIOVAN-HCT) 160-25 MG per tablet 90 tablet 2     Sig: Take 1 tablet by mouth daily       Last OV; 9/19/2022   Last labs; 12/22/2021  F/u 12/9/2022

## 2022-11-18 DIAGNOSIS — M75.81 TENDINITIS OF RIGHT ROTATOR CUFF: ICD-10-CM

## 2022-11-18 DIAGNOSIS — E78.00 PURE HYPERCHOLESTEROLEMIA: ICD-10-CM

## 2022-11-18 DIAGNOSIS — I10 ESSENTIAL HYPERTENSION: ICD-10-CM

## 2022-11-20 RX ORDER — MELOXICAM 15 MG/1
15 TABLET ORAL DAILY
Qty: 90 TABLET | Refills: 0 | Status: SHIPPED | OUTPATIENT
Start: 2022-11-20 | End: 2022-12-30 | Stop reason: SDUPTHER

## 2022-11-20 RX ORDER — ATORVASTATIN CALCIUM 20 MG/1
20 TABLET, FILM COATED ORAL DAILY
Qty: 90 TABLET | Refills: 0 | Status: SHIPPED | OUTPATIENT
Start: 2022-11-20 | End: 2022-12-30 | Stop reason: SDUPTHER

## 2022-11-20 RX ORDER — VALSARTAN AND HYDROCHLOROTHIAZIDE 160; 25 MG/1; MG/1
1 TABLET ORAL DAILY
Qty: 90 TABLET | Refills: 0 | Status: SHIPPED | OUTPATIENT
Start: 2022-11-20 | End: 2022-12-30 | Stop reason: SDUPTHER

## 2022-12-12 ENCOUNTER — PATIENT MESSAGE (OUTPATIENT)
Dept: FAMILY MEDICINE CLINIC | Age: 52
End: 2022-12-12

## 2022-12-12 NOTE — LETTER
Letter of Medical Necessity      [address]  Fady Jauregui    2022              RE: Kaci Reynoso  [address] [address]    : 1970      Darion Ordoñez and Gentlemen    This letter is being provided to support the medical necessity of [employer] with Qsymia. Ms. Srinath Holder has morbid obesity with complications including gastroesophageal reflux disease, obstructive sleep apnea and hypertensive left ventricular hypertrophy. After failing to loose weight with standard diet ane exercise she has lost 43 pounds with the use of Qsymia over the past fifteen months. Due to her co-morbidities and the long term positive impact on her health with continued weight loss, I am requesting continued coverage for Qsymia 15/92 mg once a daily. Should you have any questions or concerns, please feel free to contact my office at [phone] .     Sincerely      Cece Licea MD

## 2022-12-13 NOTE — TELEPHONE ENCOUNTER
From: Keyana Chase  To: Dr. Brennan Edge: 12/12/2022 5:45 PM EST  Subject: Qsymia P.A. I was just inform that my P. A. For my Qsymia was denied because they are missing Information from my physician. Can you help with this?   Thanks   Rony

## 2022-12-22 ENCOUNTER — PATIENT MESSAGE (OUTPATIENT)
Dept: FAMILY MEDICINE CLINIC | Age: 52
End: 2022-12-22

## 2022-12-22 ENCOUNTER — TELEPHONE (OUTPATIENT)
Dept: FAMILY MEDICINE CLINIC | Age: 52
End: 2022-12-22

## 2022-12-23 ENCOUNTER — TELEPHONE (OUTPATIENT)
Dept: ADMINISTRATIVE | Age: 52
End: 2022-12-23

## 2022-12-23 ENCOUNTER — TELEMEDICINE (OUTPATIENT)
Dept: FAMILY MEDICINE CLINIC | Age: 52
End: 2022-12-23
Payer: COMMERCIAL

## 2022-12-23 DIAGNOSIS — B37.31 CANDIDA VAGINITIS: ICD-10-CM

## 2022-12-23 DIAGNOSIS — L03.116 CELLULITIS OF FOOT, LEFT: Primary | ICD-10-CM

## 2022-12-23 PROCEDURE — 99214 OFFICE O/P EST MOD 30 MIN: CPT | Performed by: FAMILY MEDICINE

## 2022-12-23 RX ORDER — DOXYCYCLINE HYCLATE 100 MG
100 TABLET ORAL 2 TIMES DAILY
Qty: 20 TABLET | Refills: 0 | Status: SHIPPED | OUTPATIENT
Start: 2022-12-23 | End: 2023-01-02

## 2022-12-23 RX ORDER — FLUCONAZOLE 150 MG/1
150 TABLET ORAL ONCE
Qty: 1 TABLET | Refills: 0 | Status: SHIPPED | OUTPATIENT
Start: 2022-12-23 | End: 2022-12-23

## 2022-12-23 RX ORDER — CEPHALEXIN 500 MG/1
500 CAPSULE ORAL 3 TIMES DAILY
Qty: 21 CAPSULE | Refills: 0 | Status: SHIPPED | OUTPATIENT
Start: 2022-12-23 | End: 2022-12-30

## 2022-12-23 NOTE — TELEPHONE ENCOUNTER
From: Red Breen  To: Dr. Kei Navarro: 12/22/2022 6:48 PM EST  Subject: Lymphedema foot    Good evening,  My lymphedema leg has been extremely big since yesterday  Tonight when I arrived home I was in pain on my foot and took out my compression sock and saw that my big toe is extremely swollen and very painful and hot. I believe I have an infection. Any chance of having antibiotic before the holidays? I can do an e -visit or a telehealth apt. You office is probably close tomorrow? I do have an apt on the 30th with dr Job Pickard. Thank you!   Ila Mata

## 2022-12-23 NOTE — PROGRESS NOTES
2022    TELEHEALTH EVALUATION -- Audio/Visual (During QDPYD- public health emergency)    HPI:  Iwona Nash (:  1970) has requested an audio/video evaluation for the following concern(s):    Swollen foot    Has chronic lymphedema left foot. Notes it is more swollen x 2 days. Last night noted great toe is red and swollen . No fever, discharged. Denies trauma or injury.  Is a bit more red this am.   Rx; Neosporin    Hemoglobin A1C   Date Value Ref Range Status   2021 5.7 See comment % Final     Comment:     Comment:  Diagnosis of Diabetes: > or = 6.5%  Increased risk of diabetes (Prediabetes): 5.7-6.4%  Glycemic Control: Nonpregnant Adults: <7.0%                    Pregnant: <6.0%            Lab Results   Component Value Date     2021    K 3.6 2021     2021    CO2 21 2021    BUN 18 2021    CREATININE 0.7 2021    GLUCOSE 109 (H) 2021    CALCIUM 9.2 2021    PROT 6.8 2021    LABALBU 4.4 2021    BILITOT 0.5 2021    ALKPHOS 60 2021    AST 18 2021    ALT 24 2021    LABGLOM >60 2021    GFRAA >60 2021    AGRATIO 1.8 2021    GLOB 2.2 2021        Lab Results   Component Value Date    WBC 8.2 2021    HGB 15.8 2021    HCT 45.9 2021    MCV 92.7 2021     2021           Review of Systems    Outpatient Medications Marked as Taking for the 22 encounter (Telemedicine) with Анна Marie MD   Medication Sig Dispense Refill    atorvastatin (LIPITOR) 20 MG tablet Take 1 tablet by mouth daily 90 tablet 0    meloxicam (MOBIC) 15 MG tablet Take 1 tablet by mouth daily 90 tablet 0    valsartan-hydroCHLOROthiazide (DIOVAN-HCT) 160-25 MG per tablet Take 1 tablet by mouth daily 90 tablet 0    omeprazole (PRILOSEC) 40 MG delayed release capsule Take 1 capsule by mouth daily 90 capsule 3    naratriptan (AMERGE) 2.5 MG tablet 2.5 mg at onset of headache, may repeat in 4 hours if needed 12 tablet 5    polyethylene glycol (MIRALAX) 17 GM/SCOOP powder Take 17 g by mouth daily 510 g 0    Multiple Vitamins-Minerals (HAIR SKIN AND NAILS FORMULA PO) Take by mouth daily TAKE THREE TIMES DAILY       Cholecalciferol (VITAMIN D) 50 MCG ( UT) CAPS capsule Take by mouth      loratadine (CLARITIN) 10 MG tablet Take 10 mg by mouth daily          Social History     Tobacco Use    Smoking status: Former     Packs/day: 1.00     Years: 8.00     Pack years: 8.00     Types: Cigarettes     Quit date: 2002     Years since quittin.2    Smokeless tobacco: Never   Vaping Use    Vaping Use: Never used   Substance Use Topics    Alcohol use:  Yes     Alcohol/week: 1.0 standard drink     Types: 1 Glasses of wine per week     Comment: 2 on the weekends occasionally    Drug use: No        Allergies   Allergen Reactions    Latex     Bactrim [Sulfamethoxazole-Trimethoprim] Rash   ,   Past Medical History:   Diagnosis Date    Hypertension     Lymphedema     Obstructive sleep apnea syndrome 2022       PHYSICAL EXAMINATION:  [ INSTRUCTIONS:  \"[x]\" Indicates a positive item  \"[]\" Indicates a negative item  -- DELETE ALL ITEMS NOT EXAMINED]  Vital Signs: (As obtained by patient/caregiver or practitioner observation)    Blood pressure- 167/97 Heart rate-    Respiratory rate-    Temperature- 98 Pulse oximetry-   Wt 224  Wt Readings from Last 3 Encounters:   22 222 lb (100.7 kg)   22 227 lb (103 kg)   22 227 lb 9.6 oz (103.2 kg)     Temp Readings from Last 3 Encounters:   22 97.9 °F (36.6 °C) (Temporal)   22 97.4 °F (36.3 °C) (Temporal)   22 97.5 °F (36.4 °C) (Temporal)     BP Readings from Last 3 Encounters:   22 128/68   22 122/70   22 114/82     Pulse Readings from Last 3 Encounters:   22 94   22 93   22 78      Constitutional: [x] Appears well-developed and well-nourished [x] No apparent distress      [] Abnormal-   Mental status  [x] Alert and awake  [x] Oriented to person/place/time [x]Able to follow commands      Eyes:  EOM    [x]  Normal  [] Abnormal-  Sclera  [x]  Normal  [] Abnormal -         Discharge [x]  None visible  [] Abnormal -      Neck: [x] No visualized mass     Pulmonary/Chest: [x] Respiratory effort normal.  [x] No visualized signs of difficulty breathing or respiratory distress        [] Abnormal-           Skin:        [] No significant exanthematous lesions or discoloration noted on facial skin         [x] Abnormal- erythema and induration left great toe to 2 cm into foot. No abscess or discharge. + swelling. Nail appears normal           Psychiatric:       [x] Normal Affect [x] No Hallucinations        [] Abnormal-     Other pertinent observable physical exam findings-     ASSESSMENT/PLAN:  1. Cellulitis of foot, left  Elevated foot. Tylenol 650 - 1000 mg TID prn. Call if fever, progression of erythema and induration or if not better in 3 days. Side effects of current medications reviewed and questions answered. Monitor BP - likely elevated secondary to infection  - cephALEXin (KEFLEX) 500 MG capsule; Take 1 capsule by mouth 3 times daily for 7 days  Dispense: 21 capsule; Refill: 0  - doxycycline hyclate (VIBRA-TABS) 100 MG tablet; Take 1 tablet by mouth 2 times daily for 10 days  Dispense: 20 tablet; Refill: 0    2. Candida vaginitis    - fluconazole (DIFLUCAN) 150 MG tablet; Take 1 tablet by mouth once for 1 dose  Dispense: 1 tablet; Refill: 0    No follow-ups on file. Ila Parker, was evaluated through a synchronous (real-time) audio-video encounter. The patient (or guardian if applicable) is aware that this is a billable service, which includes applicable co-pays. This Virtual Visit was conducted with patient's (and/or legal guardian's) consent.  The visit was conducted pursuant to the emergency declaration under the [facility], [facility] authority and the [email or web address] and [employer] General Act. Patient identification was verified, and a caregiver was present when appropriate. The patient was located at Home: [address] [place] [address]. Provider was located at Home ([place] [address]): [place]. Total time spent on this encounter: Not billed by time    --Stephanie Logan MD on 12/23/2022 at 10:06 AM    An electronic signature was used to authenticate this note.

## 2022-12-23 NOTE — TELEPHONE ENCOUNTER
Pt stated that her toe is not as red but still red and a bit hot. She has not done compressions because she felt like it was doing more harm than good at this point. Pt did submit an evisit yesterday. She stated that if it did not go through that she can do a VV today.     Thank you

## 2022-12-23 NOTE — TELEPHONE ENCOUNTER
Submitted PA for [facility]  Via CMM Key: [id number] STATUS: APPROVED FROM       PA Case: [id number], Status: Approved, Coverage Starts on: 12/23/2022 12:00:00 AM, Coverage Ends on: 4/14/2023 12:00:00 AM.

## 2022-12-30 ENCOUNTER — OFFICE VISIT (OUTPATIENT)
Dept: FAMILY MEDICINE CLINIC | Age: 52
End: 2022-12-30

## 2022-12-30 VITALS
DIASTOLIC BLOOD PRESSURE: 64 MMHG | WEIGHT: 232.2 LBS | TEMPERATURE: 97.8 F | HEART RATE: 99 BPM | SYSTOLIC BLOOD PRESSURE: 120 MMHG | BODY MASS INDEX: 32.39 KG/M2 | OXYGEN SATURATION: 97 % | RESPIRATION RATE: 14 BRPM

## 2022-12-30 DIAGNOSIS — Z00.00 WELL ADULT EXAM: ICD-10-CM

## 2022-12-30 DIAGNOSIS — B37.31 CANDIDA VAGINITIS: ICD-10-CM

## 2022-12-30 DIAGNOSIS — E66.01 CLASS 2 SEVERE OBESITY DUE TO EXCESS CALORIES WITH SERIOUS COMORBIDITY AND BODY MASS INDEX (BMI) OF 35.0 TO 35.9 IN ADULT (HCC): ICD-10-CM

## 2022-12-30 DIAGNOSIS — H01.9 DERMATITIS OF EYELIDS OF BOTH EYES, UNSPECIFIED TYPE: ICD-10-CM

## 2022-12-30 DIAGNOSIS — L03.116 CELLULITIS OF FOOT, LEFT: Primary | ICD-10-CM

## 2022-12-30 DIAGNOSIS — M75.81 TENDINITIS OF RIGHT ROTATOR CUFF: ICD-10-CM

## 2022-12-30 DIAGNOSIS — E78.00 PURE HYPERCHOLESTEROLEMIA: ICD-10-CM

## 2022-12-30 DIAGNOSIS — I10 ESSENTIAL HYPERTENSION: ICD-10-CM

## 2022-12-30 RX ORDER — MELOXICAM 15 MG/1
15 TABLET ORAL DAILY
Qty: 90 TABLET | Refills: 0 | Status: SHIPPED | OUTPATIENT
Start: 2022-12-30

## 2022-12-30 RX ORDER — FLUCONAZOLE 150 MG/1
150 TABLET ORAL ONCE
Qty: 1 TABLET | Refills: 0 | Status: SHIPPED | OUTPATIENT
Start: 2022-12-30 | End: 2022-12-30

## 2022-12-30 RX ORDER — PHENTERMINE AND TOPIRAMATE 15; 92 MG/1; MG/1
1 CAPSULE, EXTENDED RELEASE ORAL DAILY
Qty: 30 CAPSULE | Refills: 2 | Status: SHIPPED | OUTPATIENT
Start: 2023-01-26 | End: 2023-04-26

## 2022-12-30 RX ORDER — VALSARTAN AND HYDROCHLOROTHIAZIDE 160; 25 MG/1; MG/1
1 TABLET ORAL DAILY
Qty: 90 TABLET | Refills: 0 | Status: SHIPPED | OUTPATIENT
Start: 2022-12-30

## 2022-12-30 RX ORDER — ATORVASTATIN CALCIUM 20 MG/1
20 TABLET, FILM COATED ORAL DAILY
Qty: 90 TABLET | Refills: 0 | Status: SHIPPED | OUTPATIENT
Start: 2022-12-30

## 2022-12-30 NOTE — PROGRESS NOTES
Subjective:      Patient ID: Keyana Chase 46 y.o. female. is here for evaluation for cellulits      HPI    Redness in the leg has resolved with doxy and cephalexin. .  The leg is still swollen but she has not used her pump since it was infected. Weight is up 10 lbs. No dyspnea, PND, orthopnea. No diarrhea, fever. Took Diflucan for vaginal itching, resolved. Redness upper lids:  itchy. Peeling. Slight swelling. X one week. No new detergents, soap. Rx: none. Obesity: wt is down 43 lbs overall. Tolerating Qsymia and eating well and exercising. No palpitations, insomnia, irritability. Keeps secure.      Outpatient Medications Marked as Taking for the 12/30/22 encounter (Office Visit) with Ayan Osorio MD   Medication Sig Dispense Refill    cephALEXin (KEFLEX) 500 MG capsule Take 1 capsule by mouth 3 times daily for 7 days 21 capsule 0    doxycycline hyclate (VIBRA-TABS) 100 MG tablet Take 1 tablet by mouth 2 times daily for 10 days 20 tablet 0    atorvastatin (LIPITOR) 20 MG tablet Take 1 tablet by mouth daily 90 tablet 0    meloxicam (MOBIC) 15 MG tablet Take 1 tablet by mouth daily 90 tablet 0    valsartan-hydroCHLOROthiazide (DIOVAN-HCT) 160-25 MG per tablet Take 1 tablet by mouth daily 90 tablet 0    omeprazole (PRILOSEC) 40 MG delayed release capsule Take 1 capsule by mouth daily 90 capsule 3    naratriptan (AMERGE) 2.5 MG tablet 2.5 mg at onset of headache, may repeat in 4 hours if needed 12 tablet 5    polyethylene glycol (MIRALAX) 17 GM/SCOOP powder Take 17 g by mouth daily 510 g 0    Multiple Vitamins-Minerals (HAIR SKIN AND NAILS FORMULA PO) Take by mouth daily TAKE THREE TIMES DAILY       Cholecalciferol (VITAMIN D) 50 MCG (2000 UT) CAPS capsule Take by mouth      loratadine (CLARITIN) 10 MG tablet Take 10 mg by mouth daily          Allergies   Allergen Reactions    Latex     Bactrim [Sulfamethoxazole-Trimethoprim] Rash       Patient Active Problem List   Diagnosis Lymphedema of left lower extremity    FH: hemochromatosis    Essential hypertension    Pure hypercholesterolemia    Gastroesophageal reflux disease    Vitamin D deficiency    Hypertensive left ventricular hypertrophy, without heart failure    Class 2 severe obesity due to excess calories with serious comorbidity and body mass index (BMI) of 35.0 to 35.9 in adult [facility])    Obstructive sleep apnea syndrome       Past Medical History:   Diagnosis Date    Hypertension     Lymphedema     Obstructive sleep apnea syndrome 2022       Past Surgical History:   Procedure Laterality Date    BLADDER SURGERY  2009    bladder suspension     SECTION      x2    ECTOPIC PREGNANCY SURGERY      LYMPH NODE DISSECTION Left 1979    removal of lymph node removal left groin    OTHER SURGICAL HISTORY      septum removal in uterus        Family History   Problem Relation Age of Onset    Hypertension Mother     Thyroid Disease Mother     Breast Cancer Sister 43    Thyroid Disease Sister         Grave's disease    Hemochromatosis Maternal Grandmother     Diabetes Paternal Grandmother        Social History     Tobacco Use    Smoking status: Former     Packs/day: 1.00     Years: 8.00     Pack years: 8.00     Types: Cigarettes     Quit date: 2002     Years since quittin.2    Smokeless tobacco: Never   Vaping Use    Vaping Use: Never used   Substance Use Topics    Alcohol use: Yes     Alcohol/week: 1.0 standard drink     Types: 1 Glasses of wine per week     Comment: 2 on the weekends occasionally    Drug use: No            Review of Systems  Review of Systems    Objective:   Physical Exam  Vitals:    22 1329   BP: 120/64   Pulse: 99   Resp: 14   Temp: 97.8 °F (36.6 °C)   TempSrc: Temporal   SpO2: 97%   Weight: 232 lb 3.2 oz (105.3 kg)     Wt Readings from Last 3 Encounters:   22 232 lb 3.2 oz (105.3 kg)   22 222 lb (100.7 kg)   22 227 lb (103 kg)      Physical Exam    NAD  Skin is warm and dry.  Well hydrated  erythematous maculopapular scaly rash on the upper lids. No swelling, conjunctivitis. Chest is clear, no wheezing or rales. Normal symmetric air entry throughout both lung fields. Heart regular with normal rate, no murmer or gallop  Nonpitting edema left lower ext. No erythema or induration. Assessment:       Diagnosis Orders   1. Cellulitis of foot, left  Resolved       2. Class 2 severe obesity due to excess calories with serious comorbidity and body mass index (BMI) of 35.0 to 35.9 in adult (HCC)  Phentermine-Topiramate (QSYMIA) 15-92 MG CP24  PDMP reviewed and no concerns noted. Discussed diet, exercise and weight loss strategies       3. Candida vaginitis  fluconazole (DIFLUCAN) 150 MG tablet      4. Pure hypercholesterolemia  atorvastatin (LIPITOR) 20 MG tablet      5. Tendinitis of right rotator cuff  meloxicam (MOBIC) 15 MG tablet      6. Essential hypertension  valsartan-hydroCHLOROthiazide (DIOVAN-HCT) 160-25 MG per tablet      7. Well adult exam  Comprehensive Metabolic Panel    Hemoglobin A1C    Lipid Panel    TSH with Reflex    CBC      8. Dermatitis of eyelids of both eyes, unspecified type  Aquaphor, Cortaid 1% BID max 5 - 7 dy             Plan:      Side effects of current medications reviewed and questions answered. Follow up in 3 months or prn.

## 2023-01-13 DIAGNOSIS — Z00.00 WELL ADULT EXAM: ICD-10-CM

## 2023-01-13 LAB
A/G RATIO: 2 (ref 1.1–2.2)
ALBUMIN SERPL-MCNC: 4.3 G/DL (ref 3.4–5)
ALP BLD-CCNC: 46 U/L (ref 40–129)
ALT SERPL-CCNC: 23 U/L (ref 10–40)
ANION GAP SERPL CALCULATED.3IONS-SCNC: 14 MMOL/L (ref 3–16)
AST SERPL-CCNC: 18 U/L (ref 15–37)
BILIRUB SERPL-MCNC: 0.3 MG/DL (ref 0–1)
BUN BLDV-MCNC: 22 MG/DL (ref 7–20)
CALCIUM SERPL-MCNC: 9.1 MG/DL (ref 8.3–10.6)
CHLORIDE BLD-SCNC: 102 MMOL/L (ref 99–110)
CHOLESTEROL, TOTAL: 192 MG/DL (ref 0–199)
CO2: 22 MMOL/L (ref 21–32)
CREAT SERPL-MCNC: 0.8 MG/DL (ref 0.6–1.1)
ESTIMATED AVERAGE GLUCOSE: 105.4 MG/DL
GFR SERPL CREATININE-BSD FRML MDRD: >60 ML/MIN/{1.73_M2}
GLUCOSE BLD-MCNC: 109 MG/DL (ref 70–99)
HBA1C MFR BLD: 5.3 %
HCT VFR BLD CALC: 43.9 % (ref 36–48)
HDLC SERPL-MCNC: 56 MG/DL (ref 40–60)
HEMOGLOBIN: 14.7 G/DL (ref 12–16)
LDL CHOLESTEROL CALCULATED: 123 MG/DL
MCH RBC QN AUTO: 31.3 PG (ref 26–34)
MCHC RBC AUTO-ENTMCNC: 33.4 G/DL (ref 31–36)
MCV RBC AUTO: 93.7 FL (ref 80–100)
PDW BLD-RTO: 12.8 % (ref 12.4–15.4)
PLATELET # BLD: 191 K/UL (ref 135–450)
PMV BLD AUTO: 9.7 FL (ref 5–10.5)
POTASSIUM SERPL-SCNC: 3.8 MMOL/L (ref 3.5–5.1)
RBC # BLD: 4.69 M/UL (ref 4–5.2)
SODIUM BLD-SCNC: 138 MMOL/L (ref 136–145)
TOTAL PROTEIN: 6.5 G/DL (ref 6.4–8.2)
TRIGL SERPL-MCNC: 63 MG/DL (ref 0–150)
TSH REFLEX: 1.75 UIU/ML (ref 0.27–4.2)
VLDLC SERPL CALC-MCNC: 13 MG/DL
WBC # BLD: 6 K/UL (ref 4–11)

## 2023-02-19 ENCOUNTER — PATIENT MESSAGE (OUTPATIENT)
Dept: FAMILY MEDICINE CLINIC | Age: 53
End: 2023-02-19

## 2023-02-20 ENCOUNTER — OFFICE VISIT (OUTPATIENT)
Dept: FAMILY MEDICINE CLINIC | Age: 53
End: 2023-02-20
Payer: COMMERCIAL

## 2023-02-20 VITALS
HEART RATE: 89 BPM | TEMPERATURE: 97.9 F | OXYGEN SATURATION: 95 % | SYSTOLIC BLOOD PRESSURE: 136 MMHG | WEIGHT: 234.2 LBS | RESPIRATION RATE: 14 BRPM | BODY MASS INDEX: 32.66 KG/M2 | DIASTOLIC BLOOD PRESSURE: 80 MMHG

## 2023-02-20 DIAGNOSIS — L98.9 ARM SKIN LESION, RIGHT: Primary | ICD-10-CM

## 2023-02-20 PROCEDURE — 99213 OFFICE O/P EST LOW 20 MIN: CPT | Performed by: FAMILY MEDICINE

## 2023-02-20 PROCEDURE — 3075F SYST BP GE 130 - 139MM HG: CPT | Performed by: FAMILY MEDICINE

## 2023-02-20 PROCEDURE — 3079F DIAST BP 80-89 MM HG: CPT | Performed by: FAMILY MEDICINE

## 2023-02-20 SDOH — ECONOMIC STABILITY: FOOD INSECURITY: WITHIN THE PAST 12 MONTHS, THE FOOD YOU BOUGHT JUST DIDN'T LAST AND YOU DIDN'T HAVE MONEY TO GET MORE.: NEVER TRUE

## 2023-02-20 SDOH — ECONOMIC STABILITY: FOOD INSECURITY: WITHIN THE PAST 12 MONTHS, YOU WORRIED THAT YOUR FOOD WOULD RUN OUT BEFORE YOU GOT MONEY TO BUY MORE.: NEVER TRUE

## 2023-02-20 SDOH — ECONOMIC STABILITY: INCOME INSECURITY: HOW HARD IS IT FOR YOU TO PAY FOR THE VERY BASICS LIKE FOOD, HOUSING, MEDICAL CARE, AND HEATING?: NOT HARD AT ALL

## 2023-02-20 SDOH — ECONOMIC STABILITY: HOUSING INSECURITY
IN THE LAST 12 MONTHS, WAS THERE A TIME WHEN YOU DID NOT HAVE A STEADY PLACE TO SLEEP OR SLEPT IN A SHELTER (INCLUDING NOW)?: NO

## 2023-02-20 ASSESSMENT — PATIENT HEALTH QUESTIONNAIRE - PHQ9
SUM OF ALL RESPONSES TO PHQ QUESTIONS 1-9: 0
1. LITTLE INTEREST OR PLEASURE IN DOING THINGS: 0
SUM OF ALL RESPONSES TO PHQ QUESTIONS 1-9: 0
SUM OF ALL RESPONSES TO PHQ QUESTIONS 1-9: 0
2. FEELING DOWN, DEPRESSED OR HOPELESS: 0
SUM OF ALL RESPONSES TO PHQ QUESTIONS 1-9: 0
SUM OF ALL RESPONSES TO PHQ9 QUESTIONS 1 & 2: 0

## 2023-02-20 NOTE — TELEPHONE ENCOUNTER
From: Tashi Snell  To: Dr. Devan George: 2/19/2023 5:05 PM EST  Subject: Fast growing skin lesion on arm    I have a skin lesion that appeared about a month ago and it is fast growing, itchy and bumpy. I em worried about it. I booked an appointment with dr Susie Lama for the soonest apt available which is Feb 27. Is there any chance to be seen sooner?    Thanks   Ila

## 2023-02-20 NOTE — PROGRESS NOTES
Patient is here for right arm lesion , which has been increasing X 1 month. Itching off an on . No bleeding. Raised now. Denies fever, chest pain , shortness of breath or cough. Review of Systems    ROS: All other systems were reviewed and are negative . Patient's allergies and medications were reviewed. Patient's past medical, surgical, social , and family history were reviewed. OBJECTIVE:  /80   Pulse 89   Temp 97.9 °F (36.6 °C) (Temporal)   Resp 14   Wt 234 lb 3.2 oz (106.2 kg)   SpO2 95%   BMI 32.66 kg/m²     Physical Exam    General: NAD, cooperative, alert and oriented X 3. Mood / affect is good. good insight. well hydrated. Neck : no lymphadenopathy, supple, FROM  CV: Regular rate and rhythm , no murmurs/ rub/ gallop. No edema. Lungs : CTA bilaterally, breathing comfortably  Abdomen: positive bowel sounds, soft , non tender, non distended. No hepatosplenomegaly. No CVA tenderness. Skin: no rashes. Non tender. RUE: 8 mm lesion to mid forearm- dorsal.  Raised , pink. Irregular borders. Non tender. No drainage. ASSESSMENT/  PLAN:  1. Arm skin lesion, right  - Referred to  Dermatologist - Dr. Sara Arndt or Dr. Elisabeth Ba- information given . - Concern given increasing size - precancerous or basal cell.

## 2023-03-05 NOTE — PROGRESS NOTES
Subjective:      Patient ID: Kaci Reynoso 46 y.o. female. The primary encounter diagnosis was Class 2 severe obesity due to excess calories with serious comorbidity and body mass index (BMI) of 35.0 to 35.9 in adult [facility]). A diagnosis of Essential hypertension was also pertinent to this visit. HPI    Hypertension: Patient here for follow-up of elevated blood pressure. She is exercising and is adherent to low salt diet. Blood pressure is not testing at home. Cardiac symptoms none. Patient denies chest pressure/discomfort, exertional chest pressure/discomfort, lower extremity edema, and palpitations. Cardiovascular risk factors: hypertension and obesity (BMI >= 30 kg/m2). Use of agents associated with hypertension: amphetamines. History of target organ damage: none. Obesity: treated with Qsymia, diet and exercise. PDMP reviewed and no concerns noted. Last filled Qsymia 2/28/23. She notes some brain fog since on the Qsymia  Never had a seizure. Lab Results   Component Value Date/Time     01/13/2023 07:34 AM    K 3.8 01/13/2023 07:34 AM     01/13/2023 07:34 AM    CO2 22 01/13/2023 07:34 AM    BUN 22 01/13/2023 07:34 AM    CREATININE 0.8 01/13/2023 07:34 AM    GLUCOSE 109 01/13/2023 07:34 AM    CALCIUM 9.1 01/13/2023 07:34 AM             Outpatient Medications Marked as Taking for the 3/16/23 encounter (Office Visit) with Cece Licea MD   Medication Sig Dispense Refill    atorvastatin (LIPITOR) 20 MG tablet Take 1 tablet by mouth daily 90 tablet 0    meloxicam (MOBIC) 15 MG tablet Take 1 tablet by mouth daily 90 tablet 0    valsartan-hydroCHLOROthiazide (DIOVAN-HCT) 160-25 MG per tablet Take 1 tablet by mouth daily 90 tablet 0    Phentermine-Topiramate (QSYMIA) 15-92 MG CP24 Take 1 capsule by mouth daily for 90 days.  30 capsule 2    omeprazole (PRILOSEC) 40 MG delayed release capsule Take 1 capsule by mouth daily 90 capsule 3    naratriptan (AMERGE) 2.5 MG tablet 2.5 mg at onset of headache, may repeat in 4 hours if needed 12 tablet 5    Multiple Vitamins-Minerals (HAIR SKIN AND NAILS FORMULA PO) Take by mouth daily TAKE THREE TIMES DAILY       Cholecalciferol (VITAMIN D) 50 MCG ( UT) CAPS capsule Take by mouth      loratadine (CLARITIN) 10 MG tablet Take 10 mg by mouth daily          Allergies   Allergen Reactions    Latex     Bactrim [Sulfamethoxazole-Trimethoprim] Rash       Patient Active Problem List   Diagnosis    Lymphedema of left lower extremity    FH: hemochromatosis    Essential hypertension    Pure hypercholesterolemia    Gastroesophageal reflux disease    Vitamin D deficiency    Hypertensive left ventricular hypertrophy, without heart failure    Class 2 severe obesity due to excess calories with serious comorbidity and body mass index (BMI) of 35.0 to 35.9 in adult [facility])    Obstructive sleep apnea syndrome       Past Medical History:   Diagnosis Date    Hypertension     Lymphedema     Obstructive sleep apnea syndrome 2022       Past Surgical History:   Procedure Laterality Date    BLADDER SURGERY  2009    bladder suspension     SECTION      x2    ECTOPIC PREGNANCY SURGERY      LYMPH NODE DISSECTION Left 1979    removal of lymph node removal left groin    OTHER SURGICAL HISTORY      septum removal in uterus        Family History   Problem Relation Age of Onset    Hypertension Mother     Thyroid Disease Mother     Breast Cancer Sister 43    Thyroid Disease Sister         Grave's disease    Hemochromatosis Maternal Grandmother     Diabetes Paternal Grandmother        Social History     Tobacco Use    Smoking status: Former     Packs/day: 1.00     Years: 8.00     Pack years: 8.00     Types: Cigarettes     Quit date: 2002     Years since quittin.4    Smokeless tobacco: Never   Vaping Use    Vaping Use: Never used   Substance Use Topics    Alcohol use:  Yes     Alcohol/week: 1.0 standard drink     Types: 1 Glasses of wine per week     Comment: 2 on the weekends occasionally    Drug use: No            Review of Systems  Review of Systems    Objective:   Physical Exam  Vitals:    03/16/23 1503   BP: 124/78   Pulse: 91   Resp: 14   Temp: 98.1 °F (36.7 °C)   TempSrc: Temporal   SpO2: 96%   Weight: 229 lb 6.4 oz (104.1 kg)     Wt Readings from Last 3 Encounters:   03/16/23 229 lb 6.4 oz (104.1 kg)   02/20/23 234 lb 3.2 oz (106.2 kg)   12/30/22 232 lb 3.2 oz (105.3 kg)        Physical Exam    NAD    Skin is warm and dry. No rash. Well hydrated  Alert and oriented x 3.  Mood and affect are normal.  The neck is supple and free of adenopathy or masses, the thyroid is normal without enlargement or nodules. Chest: clear with no wheezes or rales.  No retractions, or use of accessory muscles noted.    Cardiovascular: PMI is not displaced, and no thrill noted.  Regular rate and rhythm with no rub, murmur or gallop.  No peripheral edema.    The abdomen is soft without tenderness, guarding, mass, rebound or organomegaly.    Aorta, femoral, DP and PT pulses intact.      Assessment:       Diagnosis Orders   1. Class 2 severe obesity due to excess calories with serious comorbidity and body mass index (BMI) of 35.0 to 35.9 in adult (HCC)  buPROPion (WELLBUTRIN XL) 150 MG extended release tablet    naltrexone (DEPADE) 50 MG tablet  Discussed diet, exercise and weight loss strategies   Off label use addressed      2. Essential hypertension  valsartan-hydroCHLOROthiazide (DIOVAN-HCT) 160-25 MG per tablet  At goal < 130/80      3. Pure hypercholesterolemia  atorvastatin (LIPITOR) 20 MG tablet      4. Tendinitis of right rotator cuff  meloxicam (MOBIC) 15 MG tablet             Plan:      Side effects of current medications reviewed and questions answered.   Follow up in 3 months or prn.

## 2023-03-16 ENCOUNTER — OFFICE VISIT (OUTPATIENT)
Dept: FAMILY MEDICINE CLINIC | Age: 53
End: 2023-03-16
Payer: COMMERCIAL

## 2023-03-16 VITALS
HEART RATE: 91 BPM | BODY MASS INDEX: 31.99 KG/M2 | RESPIRATION RATE: 14 BRPM | TEMPERATURE: 98.1 F | SYSTOLIC BLOOD PRESSURE: 124 MMHG | WEIGHT: 229.4 LBS | DIASTOLIC BLOOD PRESSURE: 78 MMHG | OXYGEN SATURATION: 96 %

## 2023-03-16 DIAGNOSIS — I10 ESSENTIAL HYPERTENSION: ICD-10-CM

## 2023-03-16 DIAGNOSIS — E66.01 CLASS 2 SEVERE OBESITY DUE TO EXCESS CALORIES WITH SERIOUS COMORBIDITY AND BODY MASS INDEX (BMI) OF 35.0 TO 35.9 IN ADULT (HCC): Primary | ICD-10-CM

## 2023-03-16 DIAGNOSIS — E78.00 PURE HYPERCHOLESTEROLEMIA: ICD-10-CM

## 2023-03-16 DIAGNOSIS — M75.81 TENDINITIS OF RIGHT ROTATOR CUFF: ICD-10-CM

## 2023-03-16 PROCEDURE — 3078F DIAST BP <80 MM HG: CPT | Performed by: FAMILY MEDICINE

## 2023-03-16 PROCEDURE — 3074F SYST BP LT 130 MM HG: CPT | Performed by: FAMILY MEDICINE

## 2023-03-16 PROCEDURE — 99214 OFFICE O/P EST MOD 30 MIN: CPT | Performed by: FAMILY MEDICINE

## 2023-03-16 RX ORDER — ATORVASTATIN CALCIUM 20 MG/1
20 TABLET, FILM COATED ORAL DAILY
Qty: 90 TABLET | Refills: 1 | Status: SHIPPED | OUTPATIENT
Start: 2023-03-16

## 2023-03-16 RX ORDER — NALTREXONE HYDROCHLORIDE 50 MG/1
50 TABLET, FILM COATED ORAL DAILY
Qty: 30 TABLET | Refills: 2 | Status: SHIPPED | OUTPATIENT
Start: 2023-03-16

## 2023-03-16 RX ORDER — MELOXICAM 15 MG/1
15 TABLET ORAL DAILY
Qty: 90 TABLET | Refills: 1 | Status: SHIPPED | OUTPATIENT
Start: 2023-03-16

## 2023-03-16 RX ORDER — VALSARTAN AND HYDROCHLOROTHIAZIDE 160; 25 MG/1; MG/1
1 TABLET ORAL DAILY
Qty: 90 TABLET | Refills: 1 | Status: SHIPPED | OUTPATIENT
Start: 2023-03-16

## 2023-03-16 RX ORDER — BUPROPION HYDROCHLORIDE 150 MG/1
150 TABLET ORAL EVERY MORNING
Qty: 30 TABLET | Refills: 2 | Status: SHIPPED | OUTPATIENT
Start: 2023-03-16

## 2023-05-03 DIAGNOSIS — R55 NEAR SYNCOPE: ICD-10-CM

## 2023-05-03 DIAGNOSIS — Z83.49 FH: HEMOCHROMATOSIS: ICD-10-CM

## 2023-05-03 LAB
ALBUMIN SERPL-MCNC: 4.7 G/DL (ref 3.4–5)
ALBUMIN/GLOB SERPL: 2 {RATIO} (ref 1.1–2.2)
ALP SERPL-CCNC: 63 U/L (ref 40–129)
ALT SERPL-CCNC: 23 U/L (ref 10–40)
ANION GAP SERPL CALCULATED.3IONS-SCNC: 11 MMOL/L (ref 3–16)
AST SERPL-CCNC: 18 U/L (ref 15–37)
BASOPHILS # BLD: 0 K/UL (ref 0–0.2)
BASOPHILS NFR BLD: 0.5 %
BILIRUB SERPL-MCNC: 0.4 MG/DL (ref 0–1)
BUN SERPL-MCNC: 16 MG/DL (ref 7–20)
CALCIUM SERPL-MCNC: 9.4 MG/DL (ref 8.3–10.6)
CHLORIDE SERPL-SCNC: 102 MMOL/L (ref 99–110)
CO2 SERPL-SCNC: 25 MMOL/L (ref 21–32)
CREAT SERPL-MCNC: 0.7 MG/DL (ref 0.6–1.1)
DEPRECATED RDW RBC AUTO: 13 % (ref 12.4–15.4)
EOSINOPHIL # BLD: 0.2 K/UL (ref 0–0.6)
EOSINOPHIL NFR BLD: 2.4 %
FERRITIN SERPL IA-MCNC: 337.2 NG/ML (ref 15–150)
GFR SERPLBLD CREATININE-BSD FMLA CKD-EPI: >60 ML/MIN/{1.73_M2}
GLUCOSE SERPL-MCNC: 114 MG/DL (ref 70–99)
HCT VFR BLD AUTO: 46.3 % (ref 36–48)
HGB BLD-MCNC: 15.5 G/DL (ref 12–16)
IRON SATN MFR SERPL: 38 % (ref 15–50)
IRON SERPL-MCNC: 116 UG/DL (ref 37–145)
LYMPHOCYTES # BLD: 1.7 K/UL (ref 1–5.1)
LYMPHOCYTES NFR BLD: 24.4 %
MAGNESIUM SERPL-MCNC: 2 MG/DL (ref 1.8–2.4)
MCH RBC QN AUTO: 31.5 PG (ref 26–34)
MCHC RBC AUTO-ENTMCNC: 33.5 G/DL (ref 31–36)
MCV RBC AUTO: 94 FL (ref 80–100)
MONOCYTES # BLD: 0.6 K/UL (ref 0–1.3)
MONOCYTES NFR BLD: 8 %
NEUTROPHILS # BLD: 4.5 K/UL (ref 1.7–7.7)
NEUTROPHILS NFR BLD: 64.7 %
PLATELET # BLD AUTO: 205 K/UL (ref 135–450)
PMV BLD AUTO: 10 FL (ref 5–10.5)
POTASSIUM SERPL-SCNC: 4.8 MMOL/L (ref 3.5–5.1)
PROT SERPL-MCNC: 7.1 G/DL (ref 6.4–8.2)
RBC # BLD AUTO: 4.93 M/UL (ref 4–5.2)
SODIUM SERPL-SCNC: 138 MMOL/L (ref 136–145)
TIBC SERPL-MCNC: 306 UG/DL (ref 260–445)
WBC # BLD AUTO: 7 K/UL (ref 4–11)

## 2023-06-20 ENCOUNTER — OFFICE VISIT (OUTPATIENT)
Age: 53
End: 2023-06-20
Payer: COMMERCIAL

## 2023-06-20 VITALS
WEIGHT: 244.5 LBS | DIASTOLIC BLOOD PRESSURE: 80 MMHG | SYSTOLIC BLOOD PRESSURE: 138 MMHG | HEART RATE: 80 BPM | RESPIRATION RATE: 16 BRPM | OXYGEN SATURATION: 99 % | BODY MASS INDEX: 34.1 KG/M2 | TEMPERATURE: 97.7 F

## 2023-06-20 DIAGNOSIS — E66.01 CLASS 2 SEVERE OBESITY DUE TO EXCESS CALORIES WITH SERIOUS COMORBIDITY AND BODY MASS INDEX (BMI) OF 35.0 TO 35.9 IN ADULT (HCC): Primary | ICD-10-CM

## 2023-06-20 DIAGNOSIS — L50.9 HIVES: ICD-10-CM

## 2023-06-20 PROCEDURE — 3075F SYST BP GE 130 - 139MM HG: CPT | Performed by: FAMILY MEDICINE

## 2023-06-20 PROCEDURE — 3078F DIAST BP <80 MM HG: CPT | Performed by: FAMILY MEDICINE

## 2023-06-20 PROCEDURE — 99214 OFFICE O/P EST MOD 30 MIN: CPT | Performed by: FAMILY MEDICINE

## 2023-06-20 RX ORDER — SEMAGLUTIDE 0.25 MG/.5ML
0.25 INJECTION, SOLUTION SUBCUTANEOUS
Qty: 2 ML | Refills: 0 | Status: SHIPPED | OUTPATIENT
Start: 2023-06-20

## 2023-06-20 RX ORDER — METHYLPREDNISOLONE 4 MG/1
TABLET ORAL
Qty: 1 KIT | Refills: 0 | Status: SHIPPED | OUTPATIENT
Start: 2023-06-20

## 2023-06-20 RX ORDER — CETIRIZINE HYDROCHLORIDE 10 MG/1
10 CAPSULE, LIQUID FILLED ORAL DAILY
Qty: 30 CAPSULE | Refills: 12 | COMMUNITY
Start: 2023-06-20

## 2023-06-20 RX ORDER — FAMOTIDINE 20 MG/1
20 TABLET, FILM COATED ORAL 2 TIMES DAILY
Qty: 60 TABLET | Refills: 3 | COMMUNITY
Start: 2023-06-20

## 2023-06-20 NOTE — PROGRESS NOTES
use: Yes     Alcohol/week: 1.0 standard drink     Types: 1 Glasses of wine per week     Comment: 2 on the weekends occasionally    Drug use: No            Review of Systems  Review of Systems    Objective:   Physical Exam  Vitals:    06/20/23 1344 06/20/23 1355   BP: (!) 142/78 138/80   Site: Right Upper Arm Right Upper Arm   Position: Sitting Sitting   Pulse: 80    Resp: 16    Temp: 97.7 °F (36.5 °C)    TempSrc: Temporal    SpO2: 99%    Weight: 244 lb 8 oz (110.9 kg)      Wt Readings from Last 3 Encounters:   06/20/23 244 lb 8 oz (110.9 kg)   05/03/23 236 lb (107 kg)   03/16/23 229 lb 6.4 oz (104.1 kg)        Physical Exam  NAD  No respiratory distress. Skin is warm and dry. + urticarial rash cheeks, arms, chest and back  The neck is supple and free of adenopathy or masses, the thyroid is normal without enlargement or nodules. Chest is clear, no wheezing or rales. Normal symmetric air entry throughout both lung fields. Heart regular with normal rate, no murmer or gallop     Assessment:       Diagnosis Orders   1. Class 2 severe obesity due to excess calories with serious comorbidity and body mass index (BMI) of 35.0 to 35.9 in adult [facility])  Semaglutide-Weight Management (WEGOVY) 0.25 MG/0.5ML SOAJ SC injection  Continue diet and exercise. 2. Hives  Cetirizine HCl (ZYRTEC ALLERGY) 10 MG CAPS    famotidine (PEPCID) 20 MG tablet    methylPREDNISolone (MEDROL, DEEPA,) 4 MG tablet  Unclear etiology. Follow up if does not resolve  with medrol dose deepa             Plan:      Side effects of current medications reviewed and questions answered. Follow up in 3-4 months or prn.

## 2023-06-22 ENCOUNTER — TELEPHONE (OUTPATIENT)
Dept: FAMILY MEDICINE CLINIC | Age: 53
End: 2023-06-22

## 2023-06-22 NOTE — TELEPHONE ENCOUNTER
Can you PA wegovy . 25/.5 ml inject . 25 every 7 days    See office note 6-20-23- previously   treated with Qsymia, diet and exercise.    She notes some brain fog since on the Qsymia

## 2023-06-23 ENCOUNTER — PATIENT MESSAGE (OUTPATIENT)
Age: 53
End: 2023-06-23

## 2023-06-23 DIAGNOSIS — E66.01 CLASS 2 SEVERE OBESITY DUE TO EXCESS CALORIES WITH SERIOUS COMORBIDITY AND BODY MASS INDEX (BMI) OF 35.0 TO 35.9 IN ADULT (HCC): Primary | ICD-10-CM

## 2023-06-23 NOTE — TELEPHONE ENCOUNTER
Submitted PA for Wegovy Via [facility] Key: [id number] STATUS: \"This request cannot be processed due to the medication is not covered by the plan. \"    If this requires a response please respond to the pool ( [id number] [address] [facility]). Thank you please advise patient.

## 2023-06-23 NOTE — TELEPHONE ENCOUNTER
From: Dr. Any Toribio  To: Ilsa Mota  Sent: 6/23/2023 1:06 PM EDT  Subject: Deb Kumar, unfortunately your insurance does not cover Wegovy. We could try Topamax, a seizure medication that some find helpful. Let me know if you would like to try it. Take care.  Dr. Tyrell Hall

## 2023-06-23 NOTE — TELEPHONE ENCOUNTER
I can send the PA denial message back to the department and ask if there is a way to appeal the non-coverage. I am not seeing a denial letter scanned into the pts chart which is what they normally do.

## 2023-07-31 RX ORDER — SEMAGLUTIDE 0.5 MG/.5ML
0.5 INJECTION, SOLUTION SUBCUTANEOUS
Qty: 2 ML | Refills: 0 | Status: SHIPPED | OUTPATIENT
Start: 2023-07-31

## 2023-09-12 ENCOUNTER — PATIENT MESSAGE (OUTPATIENT)
Age: 53
End: 2023-09-12

## 2023-09-12 RX ORDER — SEMAGLUTIDE 1.7 MG/.75ML
1.7 INJECTION, SOLUTION SUBCUTANEOUS
Qty: 3 ML | Refills: 0 | Status: SHIPPED | OUTPATIENT
Start: 2023-09-12

## 2023-09-12 NOTE — TELEPHONE ENCOUNTER
Requested Prescriptions     Pending Prescriptions Disp Refills    Semaglutide-Weight Management (WEGOVY) 1.7 MG/0.75ML SOAJ SC injection       Sig: Inject 1.7 mg into the skin every 7 days         Last OV: 6/20/2023    Last labs: 5/3/2023     F/u: no

## 2023-09-18 ENCOUNTER — PATIENT MESSAGE (OUTPATIENT)
Age: 53
End: 2023-09-18

## 2023-09-27 NOTE — TELEPHONE ENCOUNTER
From: Monica Costa  To: Dr. Robert Snowflake: 9/18/2023 4:30 PM EDT  Subject: This is for my daugther Allen Maricel 02/04/2005    Any chances that we can get those filled by Dr Nathan Garcia? My daugther needs it for college.   Thank you,  Ila ANTHONY

## 2023-10-04 RX ORDER — SEMAGLUTIDE 2.4 MG/.75ML
2.4 INJECTION, SOLUTION SUBCUTANEOUS
Qty: 3 ML | Refills: 0 | Status: SHIPPED | OUTPATIENT
Start: 2023-10-04 | End: 2023-11-01

## 2023-10-04 NOTE — TELEPHONE ENCOUNTER
Requested Prescriptions     Pending Prescriptions Disp Refills    Semaglutide-Weight Management (WEGOVY) 2.4 MG/0.75ML SOAJ SC injection       Sig: Inject 2.4 mg into the skin every 7 days     Signed Prescriptions Disp Refills    Semaglutide-Weight Management (WEGOVY) 1.7 MG/0.75ML SOAJ SC injection 3 mL 0     Sig: Inject 1.7 mg into the skin every 7 days     Authorizing Provider: Marilia Isidro         Last OV: 6/20/2023    Last labs: 5/3/2023     F/u: no

## 2023-10-09 ENCOUNTER — OFFICE VISIT (OUTPATIENT)
Age: 53
End: 2023-10-09
Payer: COMMERCIAL

## 2023-10-09 VITALS
BODY MASS INDEX: 33.49 KG/M2 | DIASTOLIC BLOOD PRESSURE: 82 MMHG | OXYGEN SATURATION: 96 % | WEIGHT: 240.13 LBS | HEART RATE: 93 BPM | RESPIRATION RATE: 16 BRPM | TEMPERATURE: 98.1 F | SYSTOLIC BLOOD PRESSURE: 116 MMHG

## 2023-10-09 DIAGNOSIS — J01.00 ACUTE MAXILLARY SINUSITIS, RECURRENCE NOT SPECIFIED: Primary | ICD-10-CM

## 2023-10-09 DIAGNOSIS — M26.621 TMJ TENDERNESS, RIGHT: ICD-10-CM

## 2023-10-09 PROCEDURE — 99214 OFFICE O/P EST MOD 30 MIN: CPT | Performed by: FAMILY MEDICINE

## 2023-10-09 PROCEDURE — 3074F SYST BP LT 130 MM HG: CPT | Performed by: FAMILY MEDICINE

## 2023-10-09 PROCEDURE — 3079F DIAST BP 80-89 MM HG: CPT | Performed by: FAMILY MEDICINE

## 2023-10-09 RX ORDER — FLUCONAZOLE 150 MG/1
150 TABLET ORAL ONCE
Qty: 2 TABLET | Refills: 0 | Status: SHIPPED | OUTPATIENT
Start: 2023-10-09 | End: 2023-10-09

## 2023-10-09 RX ORDER — METHYLPREDNISOLONE 4 MG/1
TABLET ORAL
Qty: 1 KIT | Refills: 0 | Status: SHIPPED | OUTPATIENT
Start: 2023-10-09 | End: 2023-10-15

## 2023-10-09 RX ORDER — OMEPRAZOLE 40 MG/1
40 CAPSULE, DELAYED RELEASE ORAL DAILY
Qty: 90 CAPSULE | Refills: 0 | Status: SHIPPED | OUTPATIENT
Start: 2023-10-09

## 2023-10-09 RX ORDER — AMOXICILLIN AND CLAVULANATE POTASSIUM 875; 125 MG/1; MG/1
1 TABLET, FILM COATED ORAL 2 TIMES DAILY
Qty: 28 TABLET | Refills: 0 | Status: SHIPPED | OUTPATIENT
Start: 2023-10-09 | End: 2023-10-23

## 2023-10-16 ENCOUNTER — PATIENT MESSAGE (OUTPATIENT)
Age: 53
End: 2023-10-16

## 2023-10-23 ENCOUNTER — OFFICE VISIT (OUTPATIENT)
Dept: ORTHOPEDIC SURGERY | Age: 53
End: 2023-10-23
Payer: COMMERCIAL

## 2023-10-23 ENCOUNTER — TELEPHONE (OUTPATIENT)
Age: 53
End: 2023-10-23

## 2023-10-23 VITALS — BODY MASS INDEX: 33.6 KG/M2 | HEIGHT: 71 IN | WEIGHT: 240 LBS

## 2023-10-23 DIAGNOSIS — M25.512 LEFT SHOULDER PAIN, UNSPECIFIED CHRONICITY: Primary | ICD-10-CM

## 2023-10-23 DIAGNOSIS — M75.32 CALCIFIC TENDINITIS OF LEFT SHOULDER: ICD-10-CM

## 2023-10-23 DIAGNOSIS — M79.5 FOREIGN BODY LEFT IN SHOULDER: ICD-10-CM

## 2023-10-23 DIAGNOSIS — M75.81 TENDINITIS OF RIGHT ROTATOR CUFF: ICD-10-CM

## 2023-10-23 PROCEDURE — 99214 OFFICE O/P EST MOD 30 MIN: CPT | Performed by: PHYSICIAN ASSISTANT

## 2023-10-23 RX ORDER — HYDROCODONE BITARTRATE AND ACETAMINOPHEN 5; 325 MG/1; MG/1
1 TABLET ORAL EVERY 8 HOURS PRN
Qty: 20 TABLET | Refills: 0 | Status: ON HOLD | OUTPATIENT
Start: 2023-10-23 | End: 2023-10-30

## 2023-10-23 RX ORDER — TIZANIDINE 4 MG/1
4 TABLET ORAL 3 TIMES DAILY
Qty: 90 TABLET | Refills: 0 | Status: ON HOLD | OUTPATIENT
Start: 2023-10-23 | End: 2023-11-22

## 2023-10-23 RX ORDER — MELOXICAM 15 MG/1
15 TABLET ORAL DAILY
Qty: 90 TABLET | Refills: 1 | Status: ON HOLD | OUTPATIENT
Start: 2023-10-23

## 2023-10-23 NOTE — TELEPHONE ENCOUNTER
Pain Questions:    When did it start? Saturday 10/21/2023    Where is it located? Left arm    Does the pain radiate anywhere? All down the arm    On a scale of 1/10 how bad would you rate the pain? 11    Describe the pain? (Sharp/Stabbing/Aching) Achy    Have you tried anything to help alleviate the pain? Nothing works. Ice and heat make it worse    If taking medications- Muscle relaxer, ibuprofen, tylenol    Anything make the pain better or worse? No    Has the pain happened in the past? No    Have you been seen for this before? No    Does the pain interfere with daily activities? Yes    She cannot lift arm at all. No openings today.  Ok to work in?

## 2023-10-23 NOTE — PROGRESS NOTES
[address]  History and Physical  Shoulder Pain    Date:  10/23/2023    Name:  Sarah Shell  Address:  [address] [facility] [place][note 2] [address]    :  1970      Age:   46 y.o.    SSN:  [id number]      Medical Record Number:  [id number]    Reason for Visit:    Shoulder Pain (F/U LEFT SHOULDER)      HPI:   Sarah Shell is a 46 y.o. female who presents to our office today for follow up of the left shoulder pain. Patient has not been seen since 2022. At that time patient was diagnosed with calcific tendinitis and had failed all conservative management including formal physical therapy and injections and oral NSAIDs. Patient was asked to get an MRI at that time to evaluate the rotator cuff and the calcium deposit further for surgical consideration. The patient opted not to have surgery at the time and continue to manage it on her own with nonoperative measures. Patient continues to do formal physical therapy and eventually transition to home-based program.  She reports she was never able to get her movement or motion beyond 90 degrees of forward elevation. Patient reports that on Saturday she woke up with severe debilitating pain of her left shoulder. She reports that any slight or subtle movements of the left shoulder which shoots a sharp pain that is 10 out of 10 into her left shoulder. Patient reports she has not slept well in days since this began. She can barely move her arm. She continues to keep it in neutral position just to get some relief of pain. Patient just completed a course of oral steroids due to an upper respiratory infection. Denies fevers chills. Patient denies numbness or tingling. Pain Assessment  Location of Pain: Shoulder  Location Modifiers: Left  Severity of Pain: 10  Quality of Pain: Sharp  Duration of Pain: Persistent  Frequency of Pain: Constant  Aggravating Factors:  Other (Comment), Straightening,

## 2023-10-24 ENCOUNTER — TELEPHONE (OUTPATIENT)
Dept: ORTHOPEDIC SURGERY | Age: 53
End: 2023-10-24

## 2023-10-24 ENCOUNTER — HOSPITAL ENCOUNTER (INPATIENT)
Age: 53
LOS: 6 days | Discharge: HOME | DRG: 556 | End: 2023-10-30
Attending: EMERGENCY MEDICINE | Admitting: STUDENT IN AN ORGANIZED HEALTH CARE EDUCATION/TRAINING PROGRAM
Payer: COMMERCIAL

## 2023-10-24 DIAGNOSIS — M25.512 ACUTE PAIN OF LEFT SHOULDER: Primary | ICD-10-CM

## 2023-10-24 PROBLEM — M25.519 SHOULDER PAIN, ACUTE: Status: ACTIVE | Noted: 2023-10-24

## 2023-10-24 LAB
ANION GAP SERPL CALCULATED.3IONS-SCNC: 10 MMOL/L (ref 3–16)
BASOPHILS # BLD: 0 K/UL (ref 0–0.2)
BASOPHILS NFR BLD: 0.1 %
BUN SERPL-MCNC: 14 MG/DL (ref 7–20)
CALCIUM SERPL-MCNC: 9.2 MG/DL (ref 8.3–10.6)
CHLORIDE SERPL-SCNC: 99 MMOL/L (ref 99–110)
CO2 SERPL-SCNC: 26 MMOL/L (ref 21–32)
CREAT SERPL-MCNC: 0.6 MG/DL (ref 0.6–1.1)
CRP SERPL-MCNC: 41.1 MG/L (ref 0–5.1)
DEPRECATED RDW RBC AUTO: 13.1 % (ref 12.4–15.4)
EOSINOPHIL # BLD: 0.1 K/UL (ref 0–0.6)
EOSINOPHIL NFR BLD: 0.4 %
ERYTHROCYTE [SEDIMENTATION RATE] IN BLOOD BY WESTERGREN METHOD: 22 MM/HR (ref 0–30)
GFR SERPLBLD CREATININE-BSD FMLA CKD-EPI: >60 ML/MIN/{1.73_M2}
GLUCOSE SERPL-MCNC: 99 MG/DL (ref 70–99)
HCT VFR BLD AUTO: 43.4 % (ref 36–48)
HGB BLD-MCNC: 14.8 G/DL (ref 12–16)
LYMPHOCYTES # BLD: 2.1 K/UL (ref 1–5.1)
LYMPHOCYTES NFR BLD: 16.8 %
MCH RBC QN AUTO: 31.5 PG (ref 26–34)
MCHC RBC AUTO-ENTMCNC: 34 G/DL (ref 31–36)
MCV RBC AUTO: 92.7 FL (ref 80–100)
MONOCYTES # BLD: 1.2 K/UL (ref 0–1.3)
MONOCYTES NFR BLD: 10.2 %
NEUTROPHILS # BLD: 8.9 K/UL (ref 1.7–7.7)
NEUTROPHILS NFR BLD: 72.5 %
PLATELET # BLD AUTO: 183 K/UL (ref 135–450)
PMV BLD AUTO: 9.1 FL (ref 5–10.5)
POTASSIUM SERPL-SCNC: 3.3 MMOL/L (ref 3.5–5.1)
RBC # BLD AUTO: 4.69 M/UL (ref 4–5.2)
SODIUM SERPL-SCNC: 135 MMOL/L (ref 136–145)
WBC # BLD AUTO: 12.2 K/UL (ref 4–11)

## 2023-10-24 PROCEDURE — 6360000002 HC RX W HCPCS: Performed by: STUDENT IN AN ORGANIZED HEALTH CARE EDUCATION/TRAINING PROGRAM

## 2023-10-24 PROCEDURE — 6360000002 HC RX W HCPCS: Performed by: NURSE PRACTITIONER

## 2023-10-24 PROCEDURE — 85652 RBC SED RATE AUTOMATED: CPT

## 2023-10-24 PROCEDURE — 96375 TX/PRO/DX INJ NEW DRUG ADDON: CPT

## 2023-10-24 PROCEDURE — 36415 COLL VENOUS BLD VENIPUNCTURE: CPT

## 2023-10-24 PROCEDURE — 85025 COMPLETE CBC W/AUTO DIFF WBC: CPT

## 2023-10-24 PROCEDURE — 6370000000 HC RX 637 (ALT 250 FOR IP): Performed by: STUDENT IN AN ORGANIZED HEALTH CARE EDUCATION/TRAINING PROGRAM

## 2023-10-24 PROCEDURE — 99285 EMERGENCY DEPT VISIT HI MDM: CPT

## 2023-10-24 PROCEDURE — 87040 BLOOD CULTURE FOR BACTERIA: CPT

## 2023-10-24 PROCEDURE — 1200000000 HC SEMI PRIVATE

## 2023-10-24 PROCEDURE — 89051 BODY FLUID CELL COUNT: CPT

## 2023-10-24 PROCEDURE — 2580000003 HC RX 258: Performed by: STUDENT IN AN ORGANIZED HEALTH CARE EDUCATION/TRAINING PROGRAM

## 2023-10-24 PROCEDURE — 83605 ASSAY OF LACTIC ACID: CPT

## 2023-10-24 PROCEDURE — 96374 THER/PROPH/DIAG INJ IV PUSH: CPT

## 2023-10-24 PROCEDURE — 80048 BASIC METABOLIC PNL TOTAL CA: CPT

## 2023-10-24 PROCEDURE — 86140 C-REACTIVE PROTEIN: CPT

## 2023-10-24 RX ORDER — ATORVASTATIN CALCIUM 20 MG/1
20 TABLET, FILM COATED ORAL DAILY
Status: DISCONTINUED | OUTPATIENT
Start: 2023-10-25 | End: 2023-10-30 | Stop reason: HOSPADM

## 2023-10-24 RX ORDER — ONDANSETRON 4 MG/1
4 TABLET, ORALLY DISINTEGRATING ORAL EVERY 8 HOURS PRN
Status: DISCONTINUED | OUTPATIENT
Start: 2023-10-24 | End: 2023-10-30 | Stop reason: HOSPADM

## 2023-10-24 RX ORDER — VALSARTAN 160 MG/1
160 TABLET ORAL DAILY
Status: DISCONTINUED | OUTPATIENT
Start: 2023-10-25 | End: 2023-10-30 | Stop reason: HOSPADM

## 2023-10-24 RX ORDER — SODIUM CHLORIDE 0.9 % (FLUSH) 0.9 %
5-40 SYRINGE (ML) INJECTION PRN
Status: DISCONTINUED | OUTPATIENT
Start: 2023-10-24 | End: 2023-10-30 | Stop reason: HOSPADM

## 2023-10-24 RX ORDER — SODIUM CHLORIDE 9 MG/ML
INJECTION, SOLUTION INTRAVENOUS PRN
Status: DISCONTINUED | OUTPATIENT
Start: 2023-10-24 | End: 2023-10-30 | Stop reason: HOSPADM

## 2023-10-24 RX ORDER — POLYETHYLENE GLYCOL 3350 17 G/17G
17 POWDER, FOR SOLUTION ORAL DAILY PRN
Status: DISCONTINUED | OUTPATIENT
Start: 2023-10-24 | End: 2023-10-30 | Stop reason: HOSPADM

## 2023-10-24 RX ORDER — ONDANSETRON 2 MG/ML
4 INJECTION INTRAMUSCULAR; INTRAVENOUS ONCE
Status: COMPLETED | OUTPATIENT
Start: 2023-10-24 | End: 2023-10-24

## 2023-10-24 RX ORDER — LIDOCAINE HYDROCHLORIDE 10 MG/ML
5 INJECTION, SOLUTION EPIDURAL; INFILTRATION; INTRACAUDAL; PERINEURAL ONCE
Status: DISCONTINUED | OUTPATIENT
Start: 2023-10-24 | End: 2023-10-27 | Stop reason: SDUPTHER

## 2023-10-24 RX ORDER — HYDROCHLOROTHIAZIDE 25 MG/1
25 TABLET ORAL DAILY
Status: DISCONTINUED | OUTPATIENT
Start: 2023-10-25 | End: 2023-10-30 | Stop reason: HOSPADM

## 2023-10-24 RX ORDER — HYDROMORPHONE HYDROCHLORIDE 1 MG/ML
1 INJECTION, SOLUTION INTRAMUSCULAR; INTRAVENOUS; SUBCUTANEOUS ONCE
Status: COMPLETED | OUTPATIENT
Start: 2023-10-24 | End: 2023-10-24

## 2023-10-24 RX ORDER — HYDROMORPHONE HYDROCHLORIDE 1 MG/ML
1 INJECTION, SOLUTION INTRAMUSCULAR; INTRAVENOUS; SUBCUTANEOUS EVERY 4 HOURS PRN
Status: DISCONTINUED | OUTPATIENT
Start: 2023-10-24 | End: 2023-10-30 | Stop reason: HOSPADM

## 2023-10-24 RX ORDER — PANTOPRAZOLE SODIUM 40 MG/1
40 TABLET, DELAYED RELEASE ORAL
Status: DISCONTINUED | OUTPATIENT
Start: 2023-10-25 | End: 2023-10-30 | Stop reason: HOSPADM

## 2023-10-24 RX ORDER — VALSARTAN AND HYDROCHLOROTHIAZIDE 160; 25 MG/1; MG/1
1 TABLET ORAL DAILY
Status: DISCONTINUED | OUTPATIENT
Start: 2023-10-25 | End: 2023-10-24

## 2023-10-24 RX ORDER — ONDANSETRON 2 MG/ML
4 INJECTION INTRAMUSCULAR; INTRAVENOUS EVERY 6 HOURS PRN
Status: DISCONTINUED | OUTPATIENT
Start: 2023-10-24 | End: 2023-10-30 | Stop reason: HOSPADM

## 2023-10-24 RX ORDER — SODIUM CHLORIDE, SODIUM LACTATE, POTASSIUM CHLORIDE, CALCIUM CHLORIDE 600; 310; 30; 20 MG/100ML; MG/100ML; MG/100ML; MG/100ML
INJECTION, SOLUTION INTRAVENOUS CONTINUOUS
Status: DISCONTINUED | OUTPATIENT
Start: 2023-10-24 | End: 2023-10-30 | Stop reason: HOSPADM

## 2023-10-24 RX ORDER — ACETAMINOPHEN 650 MG/1
650 SUPPOSITORY RECTAL EVERY 6 HOURS PRN
Status: DISCONTINUED | OUTPATIENT
Start: 2023-10-24 | End: 2023-10-30 | Stop reason: HOSPADM

## 2023-10-24 RX ORDER — TIZANIDINE 4 MG/1
4 TABLET ORAL 3 TIMES DAILY
Status: DISCONTINUED | OUTPATIENT
Start: 2023-10-24 | End: 2023-10-30 | Stop reason: HOSPADM

## 2023-10-24 RX ORDER — HYDROCODONE BITARTRATE AND ACETAMINOPHEN 5; 325 MG/1; MG/1
1 TABLET ORAL EVERY 6 HOURS PRN
Status: DISCONTINUED | OUTPATIENT
Start: 2023-10-24 | End: 2023-10-26

## 2023-10-24 RX ORDER — SODIUM CHLORIDE 0.9 % (FLUSH) 0.9 %
5-40 SYRINGE (ML) INJECTION EVERY 12 HOURS SCHEDULED
Status: DISCONTINUED | OUTPATIENT
Start: 2023-10-24 | End: 2023-10-30 | Stop reason: HOSPADM

## 2023-10-24 RX ORDER — HYDROMORPHONE HYDROCHLORIDE 1 MG/ML
1 INJECTION, SOLUTION INTRAMUSCULAR; INTRAVENOUS; SUBCUTANEOUS ONCE
Status: DISCONTINUED | OUTPATIENT
Start: 2023-10-24 | End: 2023-10-24

## 2023-10-24 RX ORDER — POTASSIUM CHLORIDE 7.45 MG/ML
10 INJECTION INTRAVENOUS PRN
Status: DISCONTINUED | OUTPATIENT
Start: 2023-10-24 | End: 2023-10-30 | Stop reason: HOSPADM

## 2023-10-24 RX ORDER — POTASSIUM CHLORIDE 20 MEQ/1
40 TABLET, EXTENDED RELEASE ORAL PRN
Status: DISCONTINUED | OUTPATIENT
Start: 2023-10-24 | End: 2023-10-30 | Stop reason: HOSPADM

## 2023-10-24 RX ORDER — MAGNESIUM SULFATE IN WATER 40 MG/ML
2000 INJECTION, SOLUTION INTRAVENOUS PRN
Status: DISCONTINUED | OUTPATIENT
Start: 2023-10-24 | End: 2023-10-30 | Stop reason: HOSPADM

## 2023-10-24 RX ORDER — ACETAMINOPHEN 325 MG/1
650 TABLET ORAL EVERY 6 HOURS PRN
Status: DISCONTINUED | OUTPATIENT
Start: 2023-10-24 | End: 2023-10-30 | Stop reason: HOSPADM

## 2023-10-24 RX ADMIN — ONDANSETRON 4 MG: 2 INJECTION INTRAMUSCULAR; INTRAVENOUS at 13:54

## 2023-10-24 RX ADMIN — HYDROMORPHONE HYDROCHLORIDE 1 MG: 1 INJECTION, SOLUTION INTRAMUSCULAR; INTRAVENOUS; SUBCUTANEOUS at 13:56

## 2023-10-24 RX ADMIN — TIZANIDINE 4 MG: 4 TABLET ORAL at 23:24

## 2023-10-24 RX ADMIN — SODIUM CHLORIDE, PRESERVATIVE FREE 10 ML: 5 INJECTION INTRAVENOUS at 23:24

## 2023-10-24 RX ADMIN — ONDANSETRON 4 MG: 2 INJECTION INTRAMUSCULAR; INTRAVENOUS at 23:26

## 2023-10-24 RX ADMIN — SODIUM CHLORIDE, POTASSIUM CHLORIDE, SODIUM LACTATE AND CALCIUM CHLORIDE: 600; 310; 30; 20 INJECTION, SOLUTION INTRAVENOUS at 23:31

## 2023-10-24 RX ADMIN — HYDROMORPHONE HYDROCHLORIDE 1 MG: 1 INJECTION, SOLUTION INTRAMUSCULAR; INTRAVENOUS; SUBCUTANEOUS at 23:23

## 2023-10-24 ASSESSMENT — PAIN DESCRIPTION - LOCATION
LOCATION: SHOULDER

## 2023-10-24 ASSESSMENT — PAIN SCALES - GENERAL
PAINLEVEL_OUTOF10: 10

## 2023-10-24 ASSESSMENT — PAIN DESCRIPTION - ONSET: ONSET: ON-GOING

## 2023-10-24 ASSESSMENT — PAIN DESCRIPTION - ORIENTATION
ORIENTATION: LEFT

## 2023-10-24 ASSESSMENT — ENCOUNTER SYMPTOMS: RESPIRATORY NEGATIVE: 1

## 2023-10-24 ASSESSMENT — PAIN DESCRIPTION - PAIN TYPE
TYPE: ACUTE PAIN
TYPE: ACUTE PAIN

## 2023-10-24 ASSESSMENT — PAIN DESCRIPTION - DESCRIPTORS
DESCRIPTORS: SHARP;SHOOTING
DESCRIPTORS: SHOOTING;SHARP
DESCRIPTORS: SPASM;SHOOTING;SHARP

## 2023-10-24 ASSESSMENT — PAIN DESCRIPTION - FREQUENCY
FREQUENCY: CONTINUOUS
FREQUENCY: CONTINUOUS

## 2023-10-24 ASSESSMENT — PAIN - FUNCTIONAL ASSESSMENT
PAIN_FUNCTIONAL_ASSESSMENT: 0-10
PAIN_FUNCTIONAL_ASSESSMENT: PREVENTS OR INTERFERES SOME ACTIVE ACTIVITIES AND ADLS

## 2023-10-24 NOTE — TELEPHONE ENCOUNTER
General Question     Subject: Raul  Patient and /or Facility Request: Dada Beavers  Contact Number: [phone]    THE PT'S  STATED THAT RICHARD NEEDS THE ORDER FOR THE MRI. ALSO, HE SAID THAT HIS WIFE IS IN SO MUCH PAIN WITH HER SHOULDER, THAT HE IS TAKING HER TO THE ER.

## 2023-10-24 NOTE — CARE COORDINATION
Pt is from home w/her spouse. Pt is indp at baseline. Pt is an active . Plan to DC home, needs still being assessed. SW following.   Electronically signed by ROMA Botello, GAGE on 10/24/2023 at 3:34 PM  [phone]

## 2023-10-24 NOTE — ED PROVIDER NOTES
ED Attending Attestation Note     Date of evaluation: 10/24/2023    This patient was seen by the MEME. I have seen and examined the patient, agree with the workup, evaluation, management and diagnosis. The care plan has been discussed. I have reviewed the ECG and concur with the MEME's interpretation. I was present for any procedures performed in the MEME's note and have made edits to the note where appropriate. My assessment reveals 46 y.o. female presenting for left shoulder pain. Was seen yesterday by her orthopedist for acute on chronic shoulder pain with concern for adhesive capsulitis, possible loose shoulder foreign body and rotator cuff impingement. Today she presents because the pain is not managed by the [facility] they prescribed her. She is nontoxic-appearing, has exquisite tenderness to light palpation or any range of motion of the left shoulder most consistent with adhesive capsulitis. No infectious symptoms and low suspicion for septic arthritis. Will obtain labs including inflammatory markers and symptomatically treat.        Jennifer Hernandez MD  10/24/23 1526

## 2023-10-25 ENCOUNTER — APPOINTMENT (OUTPATIENT)
Dept: GENERAL RADIOLOGY | Age: 53
DRG: 556 | End: 2023-10-25
Payer: COMMERCIAL

## 2023-10-25 LAB
ANION GAP SERPL CALCULATED.3IONS-SCNC: 13 MMOL/L (ref 3–16)
APPEARANCE FLUID: NORMAL
APTT BLD: 30.9 SEC (ref 22.7–35.9)
BASOPHILS # BLD: 0 K/UL (ref 0–0.2)
BASOPHILS NFR BLD: 0.2 %
BDY FLUID QUALITY: NORMAL
BUN SERPL-MCNC: 13 MG/DL (ref 7–20)
CALCIUM SERPL-MCNC: 9 MG/DL (ref 8.3–10.6)
CELL COUNT FLUID TYPE: NORMAL
CHLORIDE SERPL-SCNC: 97 MMOL/L (ref 99–110)
CO2 SERPL-SCNC: 24 MMOL/L (ref 21–32)
COLOR FLUID: YELLOW
CREAT SERPL-MCNC: 0.6 MG/DL (ref 0.6–1.1)
CRYSTALS FLD MICRO: NORMAL
DEPRECATED RDW RBC AUTO: 13.5 % (ref 12.4–15.4)
EOSINOPHIL # BLD: 0.1 K/UL (ref 0–0.6)
EOSINOPHIL NFR BLD: 0.7 %
GFR SERPLBLD CREATININE-BSD FMLA CKD-EPI: >60 ML/MIN/{1.73_M2}
GLUCOSE SERPL-MCNC: 101 MG/DL (ref 70–99)
HCT VFR BLD AUTO: 40.7 % (ref 36–48)
HGB BLD-MCNC: 13.8 G/DL (ref 12–16)
INR PPP: 1 (ref 0.84–1.16)
LACTATE BLDV-SCNC: 1.3 MMOL/L (ref 0.4–2)
LYMPHOCYTES # BLD: 1.6 K/UL (ref 1–5.1)
LYMPHOCYTES NFR BLD: 15 %
LYMPHOCYTES NFR FLD: 19 %
MACROPHAGES # FLD: 15 %
MAGNESIUM SERPL-MCNC: 1.9 MG/DL (ref 1.8–2.4)
MCH RBC QN AUTO: 32 PG (ref 26–34)
MCHC RBC AUTO-ENTMCNC: 34.1 G/DL (ref 31–36)
MCV RBC AUTO: 93.9 FL (ref 80–100)
MONOCYTES # BLD: 1.3 K/UL (ref 0–1.3)
MONOCYTES NFR BLD: 11.9 %
MONOCYTES NFR FLD: 32 %
NEUTROPHIL, FLUID: 34 %
NEUTROPHILS # BLD: 7.8 K/UL (ref 1.7–7.7)
NEUTROPHILS NFR BLD: 72.2 %
NRBC BLD MANUAL-RTO: 0 %
NUC CELL # FLD: 314 /CUMM
PLATELET # BLD AUTO: 154 K/UL (ref 135–450)
PMV BLD AUTO: 9 FL (ref 5–10.5)
POTASSIUM SERPL-SCNC: 3.3 MMOL/L (ref 3.5–5.1)
PROTHROMBIN TIME: 13.2 SEC (ref 11.5–14.8)
RBC # BLD AUTO: 4.33 M/UL (ref 4–5.2)
RBC FLUID: 2600 /CUMM
SODIUM SERPL-SCNC: 134 MMOL/L (ref 136–145)
SPECIMEN SOURCE FLD: NORMAL
TOTAL CELLS COUNTED FLD: 100
WBC # BLD AUTO: 10.9 K/UL (ref 4–11)

## 2023-10-25 PROCEDURE — 2500000003 HC RX 250 WO HCPCS: Performed by: ORTHOPAEDIC SURGERY

## 2023-10-25 PROCEDURE — 89060 EXAM SYNOVIAL FLUID CRYSTALS: CPT

## 2023-10-25 PROCEDURE — 6360000002 HC RX W HCPCS: Performed by: RADIOLOGY

## 2023-10-25 PROCEDURE — 6360000002 HC RX W HCPCS: Performed by: STUDENT IN AN ORGANIZED HEALTH CARE EDUCATION/TRAINING PROGRAM

## 2023-10-25 PROCEDURE — 87070 CULTURE OTHR SPECIMN AEROBIC: CPT

## 2023-10-25 PROCEDURE — 0R9K3ZZ DRAINAGE OF LEFT SHOULDER JOINT, PERCUTANEOUS APPROACH: ICD-10-PCS | Performed by: RADIOLOGY

## 2023-10-25 PROCEDURE — 6370000000 HC RX 637 (ALT 250 FOR IP): Performed by: STUDENT IN AN ORGANIZED HEALTH CARE EDUCATION/TRAINING PROGRAM

## 2023-10-25 PROCEDURE — 83735 ASSAY OF MAGNESIUM: CPT

## 2023-10-25 PROCEDURE — 85610 PROTHROMBIN TIME: CPT

## 2023-10-25 PROCEDURE — 1200000000 HC SEMI PRIVATE

## 2023-10-25 PROCEDURE — 85025 COMPLETE CBC W/AUTO DIFF WBC: CPT

## 2023-10-25 PROCEDURE — 2500000003 HC RX 250 WO HCPCS: Performed by: RADIOLOGY

## 2023-10-25 PROCEDURE — 85730 THROMBOPLASTIN TIME PARTIAL: CPT

## 2023-10-25 PROCEDURE — 6360000004 HC RX CONTRAST MEDICATION: Performed by: ORTHOPAEDIC SURGERY

## 2023-10-25 PROCEDURE — 20610 DRAIN/INJ JOINT/BURSA W/O US: CPT

## 2023-10-25 PROCEDURE — 2580000003 HC RX 258: Performed by: STUDENT IN AN ORGANIZED HEALTH CARE EDUCATION/TRAINING PROGRAM

## 2023-10-25 PROCEDURE — 87205 SMEAR GRAM STAIN: CPT

## 2023-10-25 PROCEDURE — 80048 BASIC METABOLIC PNL TOTAL CA: CPT

## 2023-10-25 PROCEDURE — 36415 COLL VENOUS BLD VENIPUNCTURE: CPT

## 2023-10-25 RX ORDER — LIDOCAINE HYDROCHLORIDE 10 MG/ML
INJECTION, SOLUTION EPIDURAL; INFILTRATION; INTRACAUDAL; PERINEURAL PRN
Status: COMPLETED | OUTPATIENT
Start: 2023-10-25 | End: 2023-10-25

## 2023-10-25 RX ORDER — LIDOCAINE HYDROCHLORIDE 10 MG/ML
5 INJECTION, SOLUTION EPIDURAL; INFILTRATION; INTRACAUDAL; PERINEURAL ONCE
Status: COMPLETED | OUTPATIENT
Start: 2023-10-25 | End: 2023-10-25

## 2023-10-25 RX ORDER — MIDAZOLAM HYDROCHLORIDE 1 MG/ML
INJECTION INTRAMUSCULAR; INTRAVENOUS PRN
Status: COMPLETED | OUTPATIENT
Start: 2023-10-25 | End: 2023-10-25

## 2023-10-25 RX ORDER — FENTANYL CITRATE 50 UG/ML
INJECTION, SOLUTION INTRAMUSCULAR; INTRAVENOUS PRN
Status: COMPLETED | OUTPATIENT
Start: 2023-10-25 | End: 2023-10-25

## 2023-10-25 RX ADMIN — IOPAMIDOL 10 ML: 612 INJECTION, SOLUTION INTRAVENOUS at 11:47

## 2023-10-25 RX ADMIN — MIDAZOLAM HYDROCHLORIDE 1 MG: 2 INJECTION, SOLUTION INTRAMUSCULAR; INTRAVENOUS at 11:36

## 2023-10-25 RX ADMIN — ONDANSETRON 4 MG: 2 INJECTION INTRAMUSCULAR; INTRAVENOUS at 04:59

## 2023-10-25 RX ADMIN — HYDROMORPHONE HYDROCHLORIDE 1 MG: 1 INJECTION, SOLUTION INTRAMUSCULAR; INTRAVENOUS; SUBCUTANEOUS at 23:25

## 2023-10-25 RX ADMIN — POLYETHYLENE GLYCOL 3350 17 G: 17 POWDER, FOR SOLUTION ORAL at 23:26

## 2023-10-25 RX ADMIN — SODIUM CHLORIDE, POTASSIUM CHLORIDE, SODIUM LACTATE AND CALCIUM CHLORIDE: 600; 310; 30; 20 INJECTION, SOLUTION INTRAVENOUS at 17:43

## 2023-10-25 RX ADMIN — HYDROMORPHONE HYDROCHLORIDE 1 MG: 1 INJECTION, SOLUTION INTRAMUSCULAR; INTRAVENOUS; SUBCUTANEOUS at 19:20

## 2023-10-25 RX ADMIN — SODIUM CHLORIDE, POTASSIUM CHLORIDE, SODIUM LACTATE AND CALCIUM CHLORIDE: 600; 310; 30; 20 INJECTION, SOLUTION INTRAVENOUS at 17:03

## 2023-10-25 RX ADMIN — SODIUM CHLORIDE: 9 INJECTION, SOLUTION INTRAVENOUS at 11:02

## 2023-10-25 RX ADMIN — LIDOCAINE HYDROCHLORIDE 10 ML: 10 INJECTION, SOLUTION EPIDURAL; INFILTRATION; INTRACAUDAL; PERINEURAL at 11:38

## 2023-10-25 RX ADMIN — LIDOCAINE HYDROCHLORIDE 5 ML: 10 INJECTION, SOLUTION EPIDURAL; INFILTRATION; INTRACAUDAL; PERINEURAL at 11:44

## 2023-10-25 RX ADMIN — LIDOCAINE HYDROCHLORIDE 5 ML: 10 INJECTION, SOLUTION EPIDURAL; INFILTRATION; INTRACAUDAL; PERINEURAL at 13:02

## 2023-10-25 RX ADMIN — ATORVASTATIN CALCIUM 20 MG: 20 TABLET, FILM COATED ORAL at 00:42

## 2023-10-25 RX ADMIN — ONDANSETRON 4 MG: 2 INJECTION INTRAMUSCULAR; INTRAVENOUS at 10:50

## 2023-10-25 RX ADMIN — ONDANSETRON 4 MG: 2 INJECTION INTRAMUSCULAR; INTRAVENOUS at 23:33

## 2023-10-25 RX ADMIN — HYDROMORPHONE HYDROCHLORIDE 1 MG: 1 INJECTION, SOLUTION INTRAMUSCULAR; INTRAVENOUS; SUBCUTANEOUS at 10:50

## 2023-10-25 RX ADMIN — HYDROMORPHONE HYDROCHLORIDE 1 MG: 1 INJECTION, SOLUTION INTRAMUSCULAR; INTRAVENOUS; SUBCUTANEOUS at 04:59

## 2023-10-25 RX ADMIN — FENTANYL CITRATE 50 MCG: 50 INJECTION, SOLUTION INTRAMUSCULAR; INTRAVENOUS at 11:35

## 2023-10-25 RX ADMIN — ATORVASTATIN CALCIUM 20 MG: 20 TABLET, FILM COATED ORAL at 21:07

## 2023-10-25 RX ADMIN — TIZANIDINE 4 MG: 4 TABLET ORAL at 21:07

## 2023-10-25 ASSESSMENT — PAIN DESCRIPTION - ORIENTATION
ORIENTATION: LEFT

## 2023-10-25 ASSESSMENT — PAIN DESCRIPTION - DESCRIPTORS
DESCRIPTORS: ACHING
DESCRIPTORS: ACHING;STABBING
DESCRIPTORS: SHOOTING;ACHING
DESCRIPTORS: ACHING
DESCRIPTORS: ACHING;SHARP;SHOOTING
DESCRIPTORS: SHARP;SHOOTING;ACHING

## 2023-10-25 ASSESSMENT — PAIN SCALES - GENERAL
PAINLEVEL_OUTOF10: 9
PAINLEVEL_OUTOF10: 8
PAINLEVEL_OUTOF10: 3
PAINLEVEL_OUTOF10: 8
PAINLEVEL_OUTOF10: 10
PAINLEVEL_OUTOF10: 9
PAINLEVEL_OUTOF10: 8
PAINLEVEL_OUTOF10: 7

## 2023-10-25 ASSESSMENT — PAIN - FUNCTIONAL ASSESSMENT
PAIN_FUNCTIONAL_ASSESSMENT: PREVENTS OR INTERFERES SOME ACTIVE ACTIVITIES AND ADLS
PAIN_FUNCTIONAL_ASSESSMENT: PREVENTS OR INTERFERES SOME ACTIVE ACTIVITIES AND ADLS
PAIN_FUNCTIONAL_ASSESSMENT: 0-10
PAIN_FUNCTIONAL_ASSESSMENT: PREVENTS OR INTERFERES SOME ACTIVE ACTIVITIES AND ADLS

## 2023-10-25 ASSESSMENT — PAIN DESCRIPTION - LOCATION
LOCATION: SHOULDER

## 2023-10-25 ASSESSMENT — PAIN DESCRIPTION - PAIN TYPE: TYPE: ACUTE PAIN

## 2023-10-25 NOTE — H&P
10/9/2023) 30 capsule 12    famotidine (PEPCID) 20 MG tablet Take 1 tablet by mouth 2 times daily (Patient not taking: Reported on 10/9/2023) 60 tablet 3    methylPREDNISolone (MEDROL, DEEPA,) 4 MG tablet Take as directed on package insert (Patient not taking: Reported on 10/9/2023) 1 kit 0    atorvastatin (LIPITOR) 20 MG tablet Take 1 tablet by mouth daily 90 tablet 1    valsartan-hydroCHLOROthiazide (DIOVAN-HCT) 160-25 MG per tablet Take 1 tablet by mouth daily 90 tablet 1    naratriptan (AMERGE) 2.5 MG tablet 2.5 mg at onset of headache, may repeat in 4 hours if needed (Patient not taking: Reported on 10/9/2023) 12 tablet 5    Multiple Vitamins-Minerals (HAIR SKIN AND NAILS FORMULA PO) Take by mouth daily TAKE THREE TIMES DAILY       Cholecalciferol (VITAMIN D) 50 MCG (2000 UT) CAPS capsule Take by mouth (Patient not taking: Reported on 10/9/2023)      loratadine (CLARITIN) 10 MG tablet Take 1 tablet by mouth daily       Allergies:    Allergies   Allergen Reactions    Latex     Bactrim [Sulfamethoxazole-Trimethoprim] Rash     Sedation : Moderate sedation planned  ASA 1 - Normal health patient

## 2023-10-25 NOTE — CARE COORDINATION
Case Management Assessment  Initial Evaluation    Date/Time of Evaluation: 10/25/2023 8:37 AM  Assessment Completed by: Sánchez Tran RN    If patient is discharged prior to next notation, then this note serves as note for discharge by case management. Patient Name: Fabiano Kauffman                   YOB: 1970  Diagnosis: Shoulder pain, left [M25.512]  Acute pain of left shoulder [M25.512]                   Date / Time: 10/24/2023 12:56 PM    Patient Admission Status: Inpatient   Readmission Risk (Low < 19, Mod (19-27), High > 27): Readmission Risk Score: 6.9    Current PCP: Elisabeth Matta MD  PCP verified by CM? Yes    Chart Reviewed: Yes      History Provided by: Patient  Patient Orientation: Alert and Oriented    Patient Cognition: Alert    Hospitalization in the last 30 days (Readmission):  No    If yes, Readmission Assessment in CM Navigator will be completed. Advance Directives:      Code Status: Full Code   Patient's Primary Decision Maker is:        Discharge Planning:    Patient lives with: Spouse/Significant Other, Children Type of Home: House  Primary Care Giver: Self  Patient Support Systems include: Spouse/Significant Other, Children   Current Financial resources: None  Current community resources: None  Current services prior to admission: C-pap            Current DME:              Type of Home Care services:  None    ADLS  Prior functional level: Independent in ADLs/IADLs  Current functional level: Independent in ADLs/IADLs    PT AM-PAC:   /24  OT AM-PAC:   /24    Family can provide assistance at DC: Yes  Would you like Case Management to discuss the discharge plan with any other family members/significant others, and if so, who?  No  Plans to Return to Present Housing: Yes  Other Identified Issues/Barriers to RETURNING to current housing: NA  Potential Assistance needed at discharge: N/A            Potential DME:    Patient expects to discharge to: [address] [place] [address]

## 2023-10-25 NOTE — SEDATION DOCUMENTATION
IMAGING SERVICES NURSING PROGRESS NOTE    Procedure:  Arthrocentesis  October 25, 2023  Ila Mata      Allergies: Allergies   Allergen Reactions    Latex     Bactrim [Sulfamethoxazole-Trimethoprim] Rash       Vitals:    10/25/23 1151   BP: (!) 146/74   Pulse: 98   Resp: 15   Temp:    SpO2: 95%       Recent lab work reviewed with MD: yes   Procedure explained to patient by MD: yes   Informed consent obtained:yes  Family with patient: In pt    Mental Status:  Normal  Readiness to learn:  Yes  Barriers to learning: No    Pain Assessment Pre-Procedure:  Pain Present:  yes  Pain Score:  8  Pain Quality/Description:  Georgina Knights, and Shooting    Time out Procedure Verification with:  [x] RN  [x] Physician  [x] Patient  [x] Other: CT Technologist  Procedure site marked, if applicable:  Yes    Note: Patient arrived A & O x 4, denies pain, breathing easily on room air, Spoke to Dr. Meredith Joseph prior to procedure. Procedural sedation:  Fentanyl:  50 mcg  Versed:    1 mg  Post Procedureal Note:  Patient tolerated procedure well. Breathing easily on room air. Report given to 6S RN. Patient transported in stable conditon to room 6328.     Pain Assessment Post-Procedure:  Pain Present:  yes  Pain Score:  4  Pain Quality/Description:  Aching, [place], and Shooting    Plan of Care Goals:  Safety measures met:  Yes  Patient understands explanation of procedure:  Yes    Time in:  1136  Time out:  170 Blanca Barnes RN.  R.N. 10/25/2023
No

## 2023-10-25 NOTE — PROGRESS NOTES
4 Eyes Skin Assessment     NAME:  Ila Mata  YOB: 1970  MEDICAL RECORD NUMBER:  [id number]    The patient is being assessed for  Admission    I agree that at least one RN has performed a thorough Head to Toe Skin Assessment on the patient. ALL assessment sites listed below have been assessed. Areas assessed by both nurses:    Head, Face, Ears, Shoulders, Back, Chest, Arms, Elbows, Hands, and Legs. Feet and Heels        Does the Patient have a Wound?  No noted wound(s)       Osmin Prevention initiated by RN: No  Wound Care Orders initiated by RN: No    Pressure Injury (Stage 3,4, Unstageable, DTI, NWPT, and Complex wounds) if present, place Wound referral order by RN under : No    New Ostomies, if present place, Ostomy referral order under : No     Nurse 1 eSignature: Electronically signed by Chris Preciado RN on 10/25/23 at 6:36 AM EDT    **SHARE this note so that the co-signing nurse can place an eSignature**    Nurse 2 eSignature: Electronically signed by Ayaz Javier RN on 10/25/23 at 6:37 AM EDT

## 2023-10-25 NOTE — PROGRESS NOTES
S: Seen and examined at bedside. She denies any change in symptoms with regards to the severity since she came into the hospital yesterday on 24 October. She is patiently awaiting her IR aspiration today of the left shoulder with conscious sedation as she still does not tolerate palpation the shoulder or movement of the shoulder. O:   Gen: Mildly anxious secondary to her discomfort  CV: RR  Resp: nonlabored   Msk: Left upper extremity-warmth to touch over the glenohumeral joint, severe pain to light touch over the left shoulder, unable to assess motor or sensory due to severe discomfort, slight erythema overlying the shoulder compared to contralateral limb    A&P: 47 yo female with acute on chronic atraumatic left shoulder pain with exquisite tenderness and inability to move the arm in the setting of elevated inflammatory markers. There is concern for possible acute septic arthritis versus severe adhesive capsulitis.     -We will obtain IR guided aspiration of the left shoulder under conscious sedation today (spoke with interventional radiology and they said this will be performed around noon). Please see original consult note on October 24 for specifics in terms of requested labs.     -Patient to remain n.p.o. in the event that her aspiration is concerning for infection in which case we will likely take back for washout this evening after clinic    -Unfortunately, we no longer have Synovasure kits in stock so we will not be able to send a sample of her aspiration for alpha defensin  testing    -We will follow-up after aspiration results are obtained

## 2023-10-25 NOTE — PROGRESS NOTES
Pt. Returned from radiology. Pt. Reoriented to room and vitals are stable. Pt. Has rated her pain a 6 on a scale of 1-10. She is currently on bedrest for one hour. Pt. Is calm and cooperative. Will continue to monitor. no depression/no suicidal ideation/no anxiety

## 2023-10-25 NOTE — CONSULTS
[address]  History and Physical  Shoulder Pain    Date:  10/24/2023    Name:  Elda Cherry  Address:  [address] [facility] [address]    :  1970      Age:   46 y.o.    SSN:  [id number]      Medical Record Number:  [id number]    Reason for Visit:    Shoulder Pain (Shoulder pain since Saturday, saw ortho and scheduled mri in 5 days. Pain worsening and pt unable to sleep)      HPI:   Elda Cherry is a 46 y.o. female who presented to the emergency room with acute pain in the left shoulder that is dramatically worse than the day prior when she was seen in clinic. Please see clinic note from 10/23 for more details. Patient reports that overnight her pain became acutely worse and she could not sleep. She reports having a recent sinus infection that took two courses of PO antibiotics to clear and she just finished this course in addition to a course of PO steroids. She denies any history of immunodeficiency or rheumatoid arthritis. She has had a history of sepsis following an infection in her legs from lymphedema years ago. Review of Systems:  A 14 point review of systems available in the scanned medical record as documented by the patient. The review is negative with the exception of those things mentioned in the History of Present Illness and Past Medical History. Past History:  Past Medical History:   Diagnosis Date    Hypercholesteremia     Hypertension     Lymphedema     Obstructive sleep apnea syndrome 2022     Past Surgical History:   Procedure Laterality Date    BLADDER SURGERY  2009    bladder suspension     SECTION      x2    ECTOPIC PREGNANCY SURGERY      LYMPH NODE DISSECTION Left 1979    removal of lymph node removal left groin    OTHER SURGICAL HISTORY      septum removal in uterus     No current facility-administered medications on file prior to encounter.      Current Outpatient Medications on File Prior to Encounter   Medication Sig Dispense Refill    HYDROcodone-acetaminophen (NORCO) 5-325 MG per tablet Take 1 tablet by mouth every 8 hours as needed for Pain for up to 7 days. Intended supply: 5 days.  Take lowest dose possible to manage pain Max Daily Amount: 3 tablets 20 tablet 0    tiZANidine (ZANAFLEX) 4 MG tablet Take 1 tablet by mouth 3 times daily 90 tablet 0    meloxicam (MOBIC) 15 MG tablet Take 1 tablet by mouth daily 90 tablet 1    omeprazole (PRILOSEC) 40 MG delayed release capsule Take 1 capsule by mouth daily 90 capsule 0    Semaglutide-Weight Management (WEGOVY) 2.4 MG/0.75ML SOAJ SC injection Inject 2.4 mg into the skin every 7 days for 28 days 3 mL 0    Semaglutide-Weight Management (WEGOVY) 1.7 MG/0.75ML SOAJ SC injection Inject 1.7 mg into the skin every 7 days (Patient not taking: Reported on 10/9/2023) 3 mL 0    Cetirizine HCl (ZYRTEC ALLERGY) 10 MG CAPS Take 10 mg by mouth daily (Patient not taking: Reported on 10/9/2023) 30 capsule 12    famotidine (PEPCID) 20 MG tablet Take 1 tablet by mouth 2 times daily (Patient not taking: Reported on 10/9/2023) 60 tablet 3    methylPREDNISolone (MEDROL, DEEPA,) 4 MG tablet Take as directed on package insert (Patient not taking: Reported on 10/9/2023) 1 kit 0    atorvastatin (LIPITOR) 20 MG tablet Take 1 tablet by mouth daily 90 tablet 1    valsartan-hydroCHLOROthiazide (DIOVAN-HCT) 160-25 MG per tablet Take 1 tablet by mouth daily 90 tablet 1    naratriptan (AMERGE) 2.5 MG tablet 2.5 mg at onset of headache, may repeat in 4 hours if needed (Patient not taking: Reported on 10/9/2023) 12 tablet 5    Multiple Vitamins-Minerals (HAIR SKIN AND NAILS FORMULA PO) Take by mouth daily TAKE THREE TIMES DAILY       Cholecalciferol (VITAMIN D) 50 MCG (2000 UT) CAPS capsule Take by mouth (Patient not taking: Reported on 10/9/2023)      loratadine (CLARITIN) 10 MG tablet Take 1 tablet by mouth daily       Social History     Socioeconomic History    Marital status:

## 2023-10-25 NOTE — PROGRESS NOTES
V2.0    [facility] Progress Note      Name:  Manuel Serrano /Age/Sex: 1970  (48 y.o. female)   MRN & CSN:  [id number] & [id number] Encounter Date/Time: 10/25/2023 3:03 PM EDT   Location:  [address] [place] [address] PCP: Gwendolyn Rubio MD     Attending:Disha Lombardo MD       Hospital Day: 2    Assessment and Recommendations   Manuel Serrano is a 46 y.o. female with pmh of left shoulder calcific tendinitis, left lower extremity lymphedema, hypertension, hyperlipidemia, YORDAN, who presents to the ED  who presents with Shoulder pain, left      Plan:     Acute left shoulder pain  History of calcific tendinopathy  Hypertension  Hyperlipidemia  Left lower extremity lymphedema  Hyponatremia  Hypokalemia  - Patient presented with severe left shoulder pain with associated swelling, tenderness to palpation. Denies systemic infectious symptoms. Differential diagnosis includes acute calcific tendinopathy versus septic arthritis. Work-up remarkable for elevated CRP [41.1] although ESR is within normal limits.  - Orthopedic surgery consulted, failed arthrocentesis at bedside, recommending IR arthrocentesis s/p arthrocentesis. Tolerated procedure well; restart diet  - Pain control with Tylenol, Norco, Dilaudid, tizanidine. Gentle IVF rehydration. Monitor BMP and replete electrolytes as necessary.  - Continue rest of chronic home medications    Diet ADULT DIET;  Regular   DVT Prophylaxis [] Lovenox, []  Heparin, [] SCDs, [] Ambulation,  [] Eliquis, [] Xarelto  [] Coumadin   Code Status Full Code   Disposition From: Home  Expected Disposition: Home  Estimated Date of Discharge: 1 to 2 days  Patient requires continued admission due to acute on chronic atraumatic left shoulder pain possible acute septic arthritis versus severe adhesive capsulitis   Surrogate Decision Maker/ POA Spouse     Personally reviewed Lab Studies and Imaging     Discussed management of the case with attending who recommended above plan of

## 2023-10-25 NOTE — BRIEF OP NOTE
Brief Postoperative Note    Pauline Daily  YOB: 1970  [id number]    Pre-operative Diagnosis: Left shoulder pain in need of joint aspiration. Post-operative Diagnosis: Same    Procedure: Fluoroscopic guided left shoulder aspiration.     Anesthesia: moderate    Surgeons/Assistants: Richy Krishnan    Estimated Blood Loss: Minimal    Complications: none    Specimens: were obtained      Richy Krishnan MD MD  10/25/2023

## 2023-10-25 NOTE — H&P
V2.0  History and Physical      Name:  Manuel Serrano /Age/Sex: 1970  (46 y.o. female)   MRN & CSN:  [id number] & [id number] Encounter Date/Time: 10/24/2023 11:01 PM EDT   Location:  [facility] PCP: Gwendolyn Rubio MD       Hospital Day: 1    Assessment and Plan:   Patient is a 59-year-old female with a past medical history of left shoulder calcific tendinitis, left lower extremity lymphedema, hypertension, hyperlipidemia, YORDAN, who presents to the ED with complaints of severe left shoulder pain    Hospital Problems             Last Modified POA    * (Principal) Shoulder pain, left 10/24/2023 Yes    Shoulder pain, acute 10/24/2023 Yes       Acute left shoulder pain  History of calcific tendinopathy  Hypertension  Hyperlipidemia  Left lower extremity lymphedema  Hyponatremia  Hypokalemia  - Patient presented with severe left shoulder pain with associated swelling, tenderness to palpation. Denies systemic infectious symptoms. Differential diagnosis includes acute calcific tendinopathy versus septic arthritis. Work-up remarkable for elevated CRP [41.1] although ESR is within normal limits.  - Orthopedic surgery consulted, failed arthrocentesis at bedside, recommending IR arthrocentesis tomorrow, order placed, n.p.o. at midnight. Will hold off on antibiotics pending arthrocentesis. - Pain control with Tylenol, Norco, Dilaudid, tizanidine. Gentle IVF rehydration. Monitor BMP and replete electrolytes as necessary.  - Continue rest of chronic home medications  Disposition:   Current Living situation: Home  Expected Disposition: Home  Estimated D/C: 3 days    Diet ADULT DIET;  Regular  Diet NPO   DVT Prophylaxis [] Lovenox, []  Heparin, [x] SCDs, [] Ambulation,  [] Eliquis, [] Xarelto, [] Coumadin   Code Status Full Code   Surrogate Decision Maker/ POA      Personally reviewed Lab Studies and Imaging     Discussed management of the case with ED provider recommended admission    EKG interpreted PRN  HYDROmorphone, 1 mg, Q4H PRN  sodium chloride flush, 5-40 mL, PRN  sodium chloride, , PRN  ondansetron, 4 mg, Q8H PRN   Or  ondansetron, 4 mg, Q6H PRN  polyethylene glycol, 17 g, Daily PRN  acetaminophen, 650 mg, Q6H PRN   Or  acetaminophen, 650 mg, Q6H PRN  potassium chloride, 40 mEq, PRN   Or  potassium alternative oral replacement, 40 mEq, PRN   Or  potassium chloride, 10 mEq, PRN  magnesium sulfate, 2,000 mg, PRN        Labs      CBC:   Recent Labs     10/24/23  1352   WBC 12.2*   HGB 14.8        BMP:    Recent Labs     10/24/23  1352   *   K 3.3*   CL 99   CO2 26   BUN 14   CREATININE 0.6   GLUCOSE 99     Hepatic: No results for input(s): \"AST\", \"ALT\", \"ALB\", \"BILITOT\", \"ALKPHOS\" in the last 72 hours. Lipids:   Lab Results   Component Value Date/Time    CHOL 192 01/13/2023 07:34 AM    HDL 56 01/13/2023 07:34 AM    TRIG 63 01/13/2023 07:34 AM     Hemoglobin A1C:   Lab Results   Component Value Date/Time    LABA1C 5.3 01/13/2023 07:34 AM     TSH:   Lab Results   Component Value Date/Time    TSH 2.46 03/06/2019 11:32 AM     Troponin: No results found for: \"TROPONINT\"  Lactic Acid: No results for input(s): \"LACTA\" in the last 72 hours. BNP: No results for input(s): \"PROBNP\" in the last 72 hours. UA:  Lab Results   Component Value Date/Time    COLORU Yellow 01/05/2021 01:05 PM    PHUR 7.0 01/05/2021 01:05 PM    CLARITYU Clear 01/05/2021 01:05 PM    SPECGRAV 1.010 01/05/2021 01:05 PM    LEUKOCYTESUR Negative 01/05/2021 01:05 PM    BILIRUBINUR Negative 01/05/2021 01:05 PM    BLOODU Trace 01/05/2021 01:05 PM    GLUCOSEU Negative 01/05/2021 01:05 PM    KETUA Negative 01/05/2021 01:05 PM     Urine Cultures:   Lab Results   Component Value Date/Time    LABURIN  10/12/2021 03:54 PM     <10,000 CFU/ml mixed skin/urogenital hema.  No further workup     Blood Cultures: No results found for: \"BC\"  No results found for: \"BLOODCULT2\"  Organism: No results found for: \"ORG\"    Imaging/Diagnostics Last 24 Hours

## 2023-10-26 LAB — POTASSIUM SERPL-SCNC: 3.9 MMOL/L (ref 3.5–5.1)

## 2023-10-26 PROCEDURE — 6370000000 HC RX 637 (ALT 250 FOR IP): Performed by: INTERNAL MEDICINE

## 2023-10-26 PROCEDURE — 84132 ASSAY OF SERUM POTASSIUM: CPT

## 2023-10-26 PROCEDURE — 6360000002 HC RX W HCPCS: Performed by: NURSE PRACTITIONER

## 2023-10-26 PROCEDURE — 6370000000 HC RX 637 (ALT 250 FOR IP): Performed by: NURSE PRACTITIONER

## 2023-10-26 PROCEDURE — 6370000000 HC RX 637 (ALT 250 FOR IP): Performed by: STUDENT IN AN ORGANIZED HEALTH CARE EDUCATION/TRAINING PROGRAM

## 2023-10-26 PROCEDURE — 1200000000 HC SEMI PRIVATE

## 2023-10-26 PROCEDURE — 36415 COLL VENOUS BLD VENIPUNCTURE: CPT

## 2023-10-26 PROCEDURE — 6370000000 HC RX 637 (ALT 250 FOR IP): Performed by: ORTHOPAEDIC SURGERY

## 2023-10-26 RX ORDER — PREDNISONE 20 MG/1
20 TABLET ORAL 2 TIMES DAILY
Status: DISCONTINUED | OUTPATIENT
Start: 2023-10-26 | End: 2023-10-30 | Stop reason: HOSPADM

## 2023-10-26 RX ORDER — OXYCODONE HYDROCHLORIDE 5 MG/1
5 TABLET ORAL EVERY 4 HOURS PRN
Status: DISCONTINUED | OUTPATIENT
Start: 2023-10-26 | End: 2023-10-30 | Stop reason: HOSPADM

## 2023-10-26 RX ORDER — KETOROLAC TROMETHAMINE 15 MG/ML
15 INJECTION, SOLUTION INTRAMUSCULAR; INTRAVENOUS EVERY 6 HOURS PRN
Status: DISCONTINUED | OUTPATIENT
Start: 2023-10-26 | End: 2023-10-30 | Stop reason: HOSPADM

## 2023-10-26 RX ORDER — PREDNISONE 5 MG/1
10 TABLET ORAL DAILY
Status: DISCONTINUED | OUTPATIENT
Start: 2023-11-05 | End: 2023-10-30 | Stop reason: HOSPADM

## 2023-10-26 RX ORDER — DEXAMETHASONE SODIUM PHOSPHATE 4 MG/ML
8 INJECTION, SOLUTION INTRA-ARTICULAR; INTRALESIONAL; INTRAMUSCULAR; INTRAVENOUS; SOFT TISSUE ONCE
Status: COMPLETED | OUTPATIENT
Start: 2023-10-26 | End: 2023-10-26

## 2023-10-26 RX ORDER — PREDNISONE 5 MG/1
10 TABLET ORAL 2 TIMES DAILY
Status: DISCONTINUED | OUTPATIENT
Start: 2023-10-31 | End: 2023-10-30 | Stop reason: HOSPADM

## 2023-10-26 RX ORDER — PREDNISONE 20 MG/1
20 TABLET ORAL 2 TIMES DAILY
Status: DISCONTINUED | OUTPATIENT
Start: 2023-10-26 | End: 2023-10-26 | Stop reason: CLARIF

## 2023-10-26 RX ADMIN — ATORVASTATIN CALCIUM 20 MG: 20 TABLET, FILM COATED ORAL at 20:57

## 2023-10-26 RX ADMIN — HYDROCHLOROTHIAZIDE 25 MG: 25 TABLET ORAL at 08:59

## 2023-10-26 RX ADMIN — POTASSIUM BICARBONATE 40 MEQ: 782 TABLET, EFFERVESCENT ORAL at 13:46

## 2023-10-26 RX ADMIN — OXYCODONE HYDROCHLORIDE 5 MG: 5 TABLET ORAL at 11:07

## 2023-10-26 RX ADMIN — HYDROCODONE BITARTRATE AND ACETAMINOPHEN 1 TABLET: 5; 325 TABLET ORAL at 02:07

## 2023-10-26 RX ADMIN — TIZANIDINE 4 MG: 4 TABLET ORAL at 08:59

## 2023-10-26 RX ADMIN — TIZANIDINE 4 MG: 4 TABLET ORAL at 13:49

## 2023-10-26 RX ADMIN — DEXAMETHASONE SODIUM PHOSPHATE 8 MG: 4 INJECTION, SOLUTION INTRAMUSCULAR; INTRAVENOUS at 12:33

## 2023-10-26 RX ADMIN — PANTOPRAZOLE SODIUM 40 MG: 40 TABLET, DELAYED RELEASE ORAL at 06:13

## 2023-10-26 RX ADMIN — PREDNISONE 20 MG: 20 TABLET ORAL at 13:49

## 2023-10-26 RX ADMIN — PREDNISONE 20 MG: 20 TABLET ORAL at 20:57

## 2023-10-26 RX ADMIN — VALSARTAN 160 MG: 160 TABLET, FILM COATED ORAL at 08:58

## 2023-10-26 RX ADMIN — OXYCODONE HYDROCHLORIDE 5 MG: 5 TABLET ORAL at 06:13

## 2023-10-26 ASSESSMENT — PAIN DESCRIPTION - ORIENTATION
ORIENTATION: LEFT
ORIENTATION: LEFT;LOWER
ORIENTATION: LEFT
ORIENTATION: LEFT;LOWER
ORIENTATION: LEFT

## 2023-10-26 ASSESSMENT — PAIN SCALES - GENERAL
PAINLEVEL_OUTOF10: 9
PAINLEVEL_OUTOF10: 0
PAINLEVEL_OUTOF10: 8
PAINLEVEL_OUTOF10: 0
PAINLEVEL_OUTOF10: 7
PAINLEVEL_OUTOF10: 8
PAINLEVEL_OUTOF10: 8
PAINLEVEL_OUTOF10: 7
PAINLEVEL_OUTOF10: 5
PAINLEVEL_OUTOF10: 8
PAINLEVEL_OUTOF10: 3

## 2023-10-26 ASSESSMENT — PAIN DESCRIPTION - DESCRIPTORS
DESCRIPTORS: ACHING;SPASM
DESCRIPTORS: ACHING;DISCOMFORT
DESCRIPTORS: SHOOTING
DESCRIPTORS: ACHING
DESCRIPTORS: ACHING;SPASM
DESCRIPTORS: ACHING;DISCOMFORT
DESCRIPTORS: SHOOTING
DESCRIPTORS: ACHING

## 2023-10-26 ASSESSMENT — PAIN DESCRIPTION - LOCATION
LOCATION: SHOULDER
LOCATION: SHOULDER
LOCATION: BACK;SHOULDER
LOCATION: SHOULDER
LOCATION: BACK;SHOULDER
LOCATION: SHOULDER
LOCATION: SHOULDER

## 2023-10-26 ASSESSMENT — PAIN - FUNCTIONAL ASSESSMENT
PAIN_FUNCTIONAL_ASSESSMENT: PREVENTS OR INTERFERES SOME ACTIVE ACTIVITIES AND ADLS

## 2023-10-26 ASSESSMENT — PAIN DESCRIPTION - FREQUENCY: FREQUENCY: CONTINUOUS

## 2023-10-26 NOTE — PROGRESS NOTES
VSS done this AM. Pt took morning shower. Took medications this AM whole. Currently resting in room with ice pack at bed side to use for shoulder pain. Bed in lowest position and call light at the bed side within patient reach. All needs met at this time.

## 2023-10-26 NOTE — PROGRESS NOTES
Patient has lost IV access. Multiple Nurses attempted to gain access and were unsuccessful. MD notified of of loss of IV access and gave the okay to keep IV out. Will continue to monitor.

## 2023-10-26 NOTE — PROGRESS NOTES
V2.0    [facility] Progress Note      Name:  Brielle Gore /Age/Sex: 1970  (48 y.o. female)   MRN & CSN:  [id number] & [id number] Encounter Date/Time: 10/26/2023 3:03 PM EDT   Location:  [address] [place] [address] PCP: Hugo Casas MD     Attending:Antonieta Lombardo MD       Hospital Day: 3    Assessment and Recommendations   Brielle Gore is a 46 y.o. female with pmh of left shoulder calcific tendinitis, left lower extremity lymphedema, hypertension, hyperlipidemia, YORDAN, who presents to the ED  who presents with Shoulder pain, left      Plan:     Acute left shoulder pain  History of calcific tendinopathy  Hypertension  Hyperlipidemia  Left lower extremity lymphedema  Hyponatremia  Hypokalemia  Chronic atraumatic left shoulder pain with requisite tenderness and very limited range of motion; s/p IR guided aspiration left shoulder with no growth. This should be monitored for 14 days to rule out C acnes per Ortho. No surgical intervention. Outpatient corticosteroid injection once symptoms tolerable. We can try IM Decadron and IM Toradol.;  - Pain control with Tylenol, Norco, Dilaudid, tizanidine. Gentle IVF rehydration. Monitor BMP and replete electrolytes as necessary. - Continue rest of chronic home medications    Diet ADULT DIET;  Regular   DVT Prophylaxis [] Lovenox, []  Heparin, [] SCDs, [] Ambulation,  [] Eliquis, [] Xarelto  [] Coumadin   Code Status Full Code   Disposition From: Home  Expected Disposition: Home  Estimated Date of Discharge: 1 to 2 days  Patient requires continued admission due to acute on chronic atraumatic left shoulder pain possible acute septic arthritis versus severe adhesive capsulitis   Surrogate Decision Maker/ POA Spouse     Personally reviewed Lab Studies and Imaging     Discussed management of the case with attending who recommended above plan of care        Subjective:     Chief Complaint: Complaining of severe shoulder pain; unable to tolerate any exam; very

## 2023-10-26 NOTE — PLAN OF CARE
Problem: Pain  Goal: Verbalizes/displays adequate comfort level or baseline comfort level  10/26/2023 1758 by Lolita Valerio RN  Outcome: Progressing  10/26/2023 0646 by Haylie Roca RN  Outcome: Progressing  Flowsheets (Taken 10/26/2023 2569)  Verbalizes/displays adequate comfort level or baseline comfort level:   Encourage patient to monitor pain and request assistance   Assess pain using appropriate pain scale   Administer analgesics based on type and severity of pain and evaluate response   Notify Licensed Independent Practitioner if interventions unsuccessful or patient reports new pain   Implement non-pharmacological measures as appropriate and evaluate response  10/26/2023 0645 by Haylie Roca RN  Outcome: Progressing     Problem: Safety - Adult  Goal: Free from fall injury  Outcome: Progressing   Patient gets up independently

## 2023-10-26 NOTE — PROGRESS NOTES
Update to Progress Note from 10/26/23    We will begin oral prednisone taper today over the course of 15 days. This should be continued at discharge. Si mg BID x 5 days, 10 mg BID x 5 days, 10 mg qd x 5 days. We would like to see her in clinic in 2 weeks.     Sapna Rosado, DO  Orthopaedic Sports Medicine Fellow

## 2023-10-26 NOTE — PROGRESS NOTES
S: Aspiration by interventional radiology was performed yesterday successful. Cell count from aspiration is not indicative of infection. Gram stain shows no organisms as well. Culture should be held for 14 days to rule out C acnes. Jennifer states that she is feeling slightly improved today compared to yesterday. She states she is able to tolerate light touch at this point. However, she is unable to move the shoulder without significant pain or palpate shoulder. O:   Gen: NAD  CV: RR  Resp: nonlabored   Msk: Left upper extremity-sensation tact light touch C5-T1, positive motor AIN/PIN and ulnar nerve distribution, unable to actively passively to shoulder secondary to severe pain, negative erythema surrounding the shoulder    A&P: 45 yo female with acute on chronic atraumatic left shoulder pain with exquisite tenderness and inability to move the arm. -IR guided aspiration left shoulder successful and not indicative of infection based off of cell count, gross appearance of fluid, negative Gram stain and no growth to date on cultures (should be held for 14 days to rule out C.  Acnes)     -No orthopedic surgical invention at this time    -May provide corticosteroid injection into the left shoulder once her symptoms improve to the point we are able to palpate the shoulder to obtain her landmarks for injection    -If patient continues to improve throughout the day feels stable to go home she should follow-up with Dr. Leander Ribera in 7-10 days     Ruben Pollock, [facility] Fellow

## 2023-10-26 NOTE — CARE COORDINATION
CM  following for  d/c planning:    Disposition From: Home  Expected Disposition: Home  Estimated Date of Discharge: 1 to 2 days  Patient requires continued admission due to acute on chronic atraumatic left shoulder pain possible acute septic arthritis versus severe adhesive capsulitis   Surrogate Decision Maker/ POA Spouse     Orthopedic  surgery following . No CM  needs  anticipated  at  d/c        Electronically signed by Sheila Spencer RN on 10/26/2023 at 4:56 PM       Sheila Spencer RN Case Manager  [facility]  [address] [place] [address].   [place] [address]  [phone]  Fax [phone]

## 2023-10-26 NOTE — PLAN OF CARE
Problem: Pain  Goal: Verbalizes/displays adequate comfort level or baseline comfort level  10/26/2023 0646 by Jl Greenfield RN  Outcome: Progressing  Flowsheets (Taken 10/26/2023 0313)  Verbalizes/displays adequate comfort level or baseline comfort level:   Encourage patient to monitor pain and request assistance   Assess pain using appropriate pain scale   Administer analgesics based on type and severity of pain and evaluate response   Notify Licensed Independent Practitioner if interventions unsuccessful or patient reports new pain   Implement non-pharmacological measures as appropriate and evaluate response    Outcome: Progressing

## 2023-10-27 LAB
ANION GAP SERPL CALCULATED.3IONS-SCNC: 17 MMOL/L (ref 3–16)
BASOPHILS # BLD: 0 K/UL (ref 0–0.2)
BASOPHILS NFR BLD: 0.3 %
BUN SERPL-MCNC: 16 MG/DL (ref 7–20)
CALCIUM SERPL-MCNC: 9.3 MG/DL (ref 8.3–10.6)
CHLORIDE SERPL-SCNC: 99 MMOL/L (ref 99–110)
CO2 SERPL-SCNC: 22 MMOL/L (ref 21–32)
CREAT SERPL-MCNC: 0.5 MG/DL (ref 0.6–1.1)
DEPRECATED RDW RBC AUTO: 12.7 % (ref 12.4–15.4)
EOSINOPHIL # BLD: 0 K/UL (ref 0–0.6)
EOSINOPHIL NFR BLD: 0.1 %
GFR SERPLBLD CREATININE-BSD FMLA CKD-EPI: >60 ML/MIN/{1.73_M2}
GLUCOSE SERPL-MCNC: 164 MG/DL (ref 70–99)
HCT VFR BLD AUTO: 39.1 % (ref 36–48)
HGB BLD-MCNC: 13.7 G/DL (ref 12–16)
LACTATE BLDV-SCNC: 1.7 MMOL/L (ref 0.4–2)
LYMPHOCYTES # BLD: 0.9 K/UL (ref 1–5.1)
LYMPHOCYTES NFR BLD: 6.3 %
MAGNESIUM SERPL-MCNC: 2 MG/DL (ref 1.8–2.4)
MCH RBC QN AUTO: 31.8 PG (ref 26–34)
MCHC RBC AUTO-ENTMCNC: 35 G/DL (ref 31–36)
MCV RBC AUTO: 90.7 FL (ref 80–100)
MONOCYTES # BLD: 0.8 K/UL (ref 0–1.3)
MONOCYTES NFR BLD: 5.8 %
NEUTROPHILS # BLD: 12.1 K/UL (ref 1.7–7.7)
NEUTROPHILS NFR BLD: 87.5 %
PLATELET # BLD AUTO: 210 K/UL (ref 135–450)
PMV BLD AUTO: 9.3 FL (ref 5–10.5)
POTASSIUM SERPL-SCNC: 3.3 MMOL/L (ref 3.5–5.1)
RBC # BLD AUTO: 4.31 M/UL (ref 4–5.2)
SODIUM SERPL-SCNC: 138 MMOL/L (ref 136–145)
WBC # BLD AUTO: 13.9 K/UL (ref 4–11)

## 2023-10-27 PROCEDURE — 6360000002 HC RX W HCPCS: Performed by: NURSE PRACTITIONER

## 2023-10-27 PROCEDURE — 83605 ASSAY OF LACTIC ACID: CPT

## 2023-10-27 PROCEDURE — 87641 MR-STAPH DNA AMP PROBE: CPT

## 2023-10-27 PROCEDURE — 6370000000 HC RX 637 (ALT 250 FOR IP): Performed by: ORTHOPAEDIC SURGERY

## 2023-10-27 PROCEDURE — 83735 ASSAY OF MAGNESIUM: CPT

## 2023-10-27 PROCEDURE — 85025 COMPLETE CBC W/AUTO DIFF WBC: CPT

## 2023-10-27 PROCEDURE — 80048 BASIC METABOLIC PNL TOTAL CA: CPT

## 2023-10-27 PROCEDURE — 2580000003 HC RX 258: Performed by: STUDENT IN AN ORGANIZED HEALTH CARE EDUCATION/TRAINING PROGRAM

## 2023-10-27 PROCEDURE — 99222 1ST HOSP IP/OBS MODERATE 55: CPT | Performed by: INTERNAL MEDICINE

## 2023-10-27 PROCEDURE — 2580000003 HC RX 258: Performed by: NURSE PRACTITIONER

## 2023-10-27 PROCEDURE — 1200000000 HC SEMI PRIVATE

## 2023-10-27 PROCEDURE — 6370000000 HC RX 637 (ALT 250 FOR IP): Performed by: STUDENT IN AN ORGANIZED HEALTH CARE EDUCATION/TRAINING PROGRAM

## 2023-10-27 PROCEDURE — 36415 COLL VENOUS BLD VENIPUNCTURE: CPT

## 2023-10-27 RX ORDER — SODIUM CHLORIDE 0.9 % (FLUSH) 0.9 %
5-40 SYRINGE (ML) INJECTION EVERY 12 HOURS SCHEDULED
Status: DISCONTINUED | OUTPATIENT
Start: 2023-10-27 | End: 2023-10-30 | Stop reason: HOSPADM

## 2023-10-27 RX ORDER — SODIUM CHLORIDE 9 MG/ML
25 INJECTION, SOLUTION INTRAVENOUS PRN
Status: DISCONTINUED | OUTPATIENT
Start: 2023-10-27 | End: 2023-10-30 | Stop reason: HOSPADM

## 2023-10-27 RX ORDER — SODIUM CHLORIDE 0.9 % (FLUSH) 0.9 %
5-40 SYRINGE (ML) INJECTION PRN
Status: DISCONTINUED | OUTPATIENT
Start: 2023-10-27 | End: 2023-10-30 | Stop reason: HOSPADM

## 2023-10-27 RX ORDER — LIDOCAINE HYDROCHLORIDE 10 MG/ML
5 INJECTION, SOLUTION EPIDURAL; INFILTRATION; INTRACAUDAL; PERINEURAL ONCE
Status: DISCONTINUED | OUTPATIENT
Start: 2023-10-27 | End: 2023-10-27

## 2023-10-27 RX ADMIN — POLYETHYLENE GLYCOL 3350 17 G: 17 POWDER, FOR SOLUTION ORAL at 09:02

## 2023-10-27 RX ADMIN — PIPERACILLIN AND TAZOBACTAM 4500 MG: 4; .5 INJECTION, POWDER, LYOPHILIZED, FOR SOLUTION INTRAVENOUS at 14:28

## 2023-10-27 RX ADMIN — ATORVASTATIN CALCIUM 20 MG: 20 TABLET, FILM COATED ORAL at 20:34

## 2023-10-27 RX ADMIN — PREDNISONE 20 MG: 20 TABLET ORAL at 09:02

## 2023-10-27 RX ADMIN — SODIUM CHLORIDE: 9 INJECTION, SOLUTION INTRAVENOUS at 14:27

## 2023-10-27 RX ADMIN — HYDROCHLOROTHIAZIDE 25 MG: 25 TABLET ORAL at 09:02

## 2023-10-27 RX ADMIN — KETOROLAC TROMETHAMINE 15 MG: 15 INJECTION, SOLUTION INTRAMUSCULAR; INTRAVENOUS at 12:00

## 2023-10-27 RX ADMIN — POTASSIUM CHLORIDE 40 MEQ: 1500 TABLET, EXTENDED RELEASE ORAL at 20:36

## 2023-10-27 RX ADMIN — ONDANSETRON 4 MG: 4 TABLET, ORALLY DISINTEGRATING ORAL at 10:51

## 2023-10-27 RX ADMIN — PREDNISONE 20 MG: 20 TABLET ORAL at 20:36

## 2023-10-27 RX ADMIN — TIZANIDINE 4 MG: 4 TABLET ORAL at 20:35

## 2023-10-27 RX ADMIN — VALSARTAN 160 MG: 160 TABLET, FILM COATED ORAL at 09:02

## 2023-10-27 RX ADMIN — SODIUM CHLORIDE, POTASSIUM CHLORIDE, SODIUM LACTATE AND CALCIUM CHLORIDE: 600; 310; 30; 20 INJECTION, SOLUTION INTRAVENOUS at 17:25

## 2023-10-27 RX ADMIN — VANCOMYCIN HYDROCHLORIDE 2000 MG: 10 INJECTION, POWDER, LYOPHILIZED, FOR SOLUTION INTRAVENOUS at 17:30

## 2023-10-27 RX ADMIN — PANTOPRAZOLE SODIUM 40 MG: 40 TABLET, DELAYED RELEASE ORAL at 05:11

## 2023-10-27 ASSESSMENT — PAIN DESCRIPTION - LOCATION: LOCATION: SHOULDER

## 2023-10-27 ASSESSMENT — PAIN - FUNCTIONAL ASSESSMENT: PAIN_FUNCTIONAL_ASSESSMENT: PREVENTS OR INTERFERES SOME ACTIVE ACTIVITIES AND ADLS

## 2023-10-27 ASSESSMENT — PAIN DESCRIPTION - ORIENTATION: ORIENTATION: LEFT

## 2023-10-27 ASSESSMENT — PAIN SCALES - GENERAL
PAINLEVEL_OUTOF10: 0
PAINLEVEL_OUTOF10: 8

## 2023-10-27 ASSESSMENT — PAIN DESCRIPTION - DESCRIPTORS: DESCRIPTORS: ACHING

## 2023-10-27 NOTE — CONSULTS
RESIDENT NOTE - reviewed / edited, Attending note at bottom   Infectious Diseases   Consult Note      Reason for Consult:  cellulitis   Requesting Provider:  PRIYANKA Lo CNP      Date of Admission: 10/24/2023    Subjective     CHIEF COMPLAINT: Shoulder Pain (Shoulder pain since Saturday, saw ortho and scheduled mri in 5 days. Pain worsening and pt unable to sleep)      HPI:    Mrs. Sarah Shell is a 46 y.o. white/non- female with a MHx significant for severe latex allergy, severe Bactrim allergy, chronic left shoulder calcific tendinitis, h/o severe sepsis 2/2 to LLE cellulitis (8/2018), chronic LLE lymphedema, HTN, HLD, who presented to the ED on 10/24/23 with a 1-day h/o acutely worsening severe pain to superficial touch of the left shoulder and overlying swelling. At that time, she states the pain was excruciating even with the slightest touch. Mrs. Macarena Nogueira reports 2 small erythematous, \"puffy\" regions on her left deltoid region starting around 1:00 am today (10/27), which started expanding after 11:00 am to include her entire left upper arm/shoulder and back with concurrent nausea and facial flushing. She notes orthopedics attempting a bedside aspiration on 10/24; however, it had to be aborted 2/2 severe pain. Subsequently, she was able to undergo IR-guided aspiration of the left shoulder joint on 10/25 with some improvement in her shoulder pain and can confirm that no latex products were used during the procedure as others present also had such allergy. She has had exposure to steroids in the past, including an injection in her right shoulder without any notable adverse events. She denies experiencing any sensation of her throat or tongue swelling, shortness of breath, chest tightness/pressure/pain. Her previous reactions to latex and Bactrim included difficulty breathing as well as hives.   She expresses concern for developing sepsis as she did in 2018 from E Blood Updated: 10/25/23 1715     Culture, Blood 2 No Growth to date. Any change in status will be called. Narrative:      ORDER#: [id number]                          ORDERED BY: Shelley Medina  SOURCE: Blood R hand                       COLLECTED:  10/24/23 16:19  ANTIBIOTICS AT TERRY.:                      RECEIVED :  10/24/23 23:50  If child <=2 yrs old please draw pediatric bottle. ~Blood Culture #2    Culture, Blood 1 [[id number]] Collected: 10/24/23 1619    Order Status: Completed Specimen: Blood Updated: 10/25/23 1715     Blood Culture, Routine No Growth to date. Any change in status will be called. Narrative:      ORDER#: [id number]                          ORDERED BY: Shelley Medina  SOURCE: Blood                              COLLECTED:  10/24/23 16:19  ANTIBIOTICS AT TERRY.:                      RECEIVED :  10/24/23 23:50  If child <=2 yrs old please draw pediatric bottle. ~Blood Culture 1              10/25/23:  Right shoulder aspirate? Component Ref Range & Units 10/25/23 1153   Cell Count Fluid Type  Shoulder  Right    Color, Fluid  Yellow    Appearance, Fluid  Cloudy    Clot Eval.  see below    Comment: No Clots Seen   Nucl Cell, Fluid /cumm 314    RBC, Fluid /cumm 2,600    Neutrophil Count, Fluid % 34    Lymphocytes, Body Fluid % 19    Monocyte Count, Fluid % 32    Macrophages % 15    nRBC, Fluid % 0    Number of Cells Counted Fluid  100        RADIOLOGY REVIEW:  All pertinent images / reports were reviewed as a part of this visit. Assessment & Plan   46 y.o. white/non- female with a MHx significant for chronic left shoulder calcific tendinitis, LLE lymphedema, HTN, HLD, who presented to the ED on 10/24/23 with severe left shoulder pain and swelling.                 Atopic reaction vs acute LUE cellulitis  Acute on chronic atraumatic L shoulder pain  Chronic L shoulder calcific tendinitis  Hx of severe latex and Bactrim allergy (dyspnea, hives)  The acute onset of the rash and rapid

## 2023-10-27 NOTE — CARE COORDINATION
CM  following for  d/c planning:    Disposition From: Home  Expected Disposition: Home  Estimated Date of Discharge: 1 to 2 days  Patient requires continued admission due to acute on chronic atraumatic left shoulder pain possible acute septic arthritis versus severe adhesive capsulitis   Surrogate Decision Maker/ POA Spouse     Orthopedic  surgery following . Getting  MRI of Shoulder. No CM  needs  anticipated  at  d/c        Electronically signed by Zackary Mccann RN on 10/27/2023 at 4:48 PM       Zackary Mccann RN Case Manager  [facility]  [address] [place] [address].   [facility] [address]  [phone]  Fax [phone]

## 2023-10-27 NOTE — CONSULTS
Clinical Pharmacy Progress Note    Vancomycin - Management by Pharmacy    Consult Date(s): 10/27/23  Consulting Provider(s): Deb Morley NP    Assessment / Plan  1)  Left upper extremity cellulitis - Vancomycin  Concurrent Antimicrobials: Zosyn  Day of Vanc Therapy / Ordered Duration: 1 of 7  Current Dosing Method: Bayesian-Guided AUC Dosing  Therapeutic Goal: AUC < 500 mg/L*hr  Current Dose / Plan: Will give 2000mg IV x1 loading dose, followed by 1500mg IV q12h. Regimen predicts an AUC = 545 with trough = 16.9 mcg/mL. (AUC is > 500, but not toxic - lower regimens have low probability of reaching therapeutic levels). Will plan to check random level in AM 10/29 (not yet ordered). Will continue to monitor clinical condition and make adjustments to regimen as appropriate. Please call with questions--  Thanks--  Carolina Sam, PharmD, [address] [place] [address], [facility]  [id number] ([facility])   10/27/2023 2:03 PM      Interval update:  Pt is s/p IR aspiration of left shoulder - no growth on culture. No surgical intervention per ortho. Vanc / Zosyn started for LUE cellulitis; ID consulted. Subjective/Objective:   Mai Armenta is a 46 y.o. female with a PMHx significant for HTN, HLD, YORDAN, and lymphedema who is admitted with left shoulder pain. Pharmacy is consulted to dose Vancomycin. Ht Readings from Last 1 Encounters:   10/24/23 1.803 m (5' 11\")     Wt Readings from Last 1 Encounters:   10/24/23 105.6 kg (232 lb 14.4 oz)     Current & Prior Antimicrobial Regimen(s):  Zosyn (10/27-current)  Vancomycin - Pharmacy to dose  2000mg IV x1 10/27  1500mg IV q12h (10/28-current)    Vancomycin Level(s) / Doses:    Date Time Dose Type of Level / Level Interpretation                 Note: Serum levels collected for AUC-based dosing may be high if collected in close proximity to the dose administered. This is not necessarily indicative of toxicity.     Cultures & Sensitivities:    Date Site Micro Susceptibility / Result

## 2023-10-27 NOTE — PROGRESS NOTES
Patient called out and stated she was feeling hot and nauseated. Her face was very flushed, but patient did not have a fever 97.8, also stated her heart feels like its racing- HR 96. Patient given an ice pack and placed on her forehead, also given prn zofran for nausea. Will continue to monitor on rounds.

## 2023-10-27 NOTE — PROGRESS NOTES
Patient seen and examined at bedside by Dr. Taryn Preciado. Patient improving symptomatically. There is some erythema about her shoulder. Despite arthrocentesis being negative, will rule out concomitant deep abscess (biceps sheath abscess, naima-conjoint tendon abscess) with an MRI of the L shoulder without contrast. Will place order. No need for NPO or additional labs at this time.      Arian Ramos MD

## 2023-10-27 NOTE — PROGRESS NOTES
V2.0    [facility] [place] [place] Progress Note      Name:  Ameena Ramírez /Age/Sex: 1970  (48 y.o. female)   MRN & CSN:  [id number] & [id number] Encounter Date/Time: 10/27/2023 3:03 PM EDT   Location:  [facility] PCP: Lupe Pradhan MD     Attending:Amada Lombardo MD       Hospital Day: 4    Assessment and Recommendations   Ameena Ramírez is a 46 y.o. female with pmh of left shoulder calcific tendinitis, left lower extremity lymphedema, hypertension, hyperlipidemia, YORDAN, who presents to the ED  who presents with Shoulder pain, left      Plan:     Acute left shoulder pain  History of calcific tendinopathy  Hypertension  Hyperlipidemia  Left lower extremity lymphedema  Hyponatremia  Hypokalemia  Chronic atraumatic left shoulder pain with requisite tenderness and very limited range of motion; s/p IR guided aspiration left shoulder with no growth. This should be monitored for 14 days to rule out C acnes per Ortho. No surgical intervention. Outpatient corticosteroid injection once symptoms tolerable. We can try IM Decadron and IM Toradol.;  - Pain control with Tylenol, Norco, Dilaudid, tizanidine. Gentle IVF rehydration. Monitor BMP and replete electrolytes as necessary. - Continue rest of chronic home medications    Left upper extremity cellulitis-discussed with Dr. Unique Ch attending; started on vancomycin and Zosyn ID consult for further management discussed with ID as well    Diet ADULT DIET;  Regular   DVT Prophylaxis [] Lovenox, []  Heparin, [] SCDs, [] Ambulation,  [] Eliquis, [] Xarelto  [] Coumadin   Code Status Full Code   Disposition From: Home  Expected Disposition: Home  Estimated Date of Discharge: 1 to 2 days  Patient requires continued admission due to acute on chronic atraumatic left shoulder pain possible acute septic arthritis versus severe adhesive capsulitis   Surrogate Decision Maker/ POA Spouse     Personally reviewed Lab Studies and Imaging     Discussed management of the case with chloride flush  5-40 mL IntraVENous 2 times per day    valsartan  160 mg Oral Daily    And    hydroCHLOROthiazide  25 mg Oral Daily      Infusions:    sodium chloride      sodium chloride Stopped (10/25/23 1130)    lactated ringers IV soln 75 mL/hr at 10/25/23 1743     PRN Meds: sodium chloride flush, 5-40 mL, PRN  sodium chloride, 25 mL, PRN  oxyCODONE, 5 mg, Q4H PRN  ketorolac, 15 mg, Q6H PRN  HYDROmorphone, 1 mg, Q4H PRN  sodium chloride flush, 5-40 mL, PRN  sodium chloride, , PRN  ondansetron, 4 mg, Q8H PRN   Or  ondansetron, 4 mg, Q6H PRN  polyethylene glycol, 17 g, Daily PRN  acetaminophen, 650 mg, Q6H PRN   Or  acetaminophen, 650 mg, Q6H PRN  potassium chloride, 40 mEq, PRN   Or  potassium alternative oral replacement, 40 mEq, PRN   Or  potassium chloride, 10 mEq, PRN  magnesium sulfate, 2,000 mg, PRN        Labs and Imaging   FL ARTHR/ASP/INJ MAJOR JT/BURSA LT WO US    Result Date: 10/25/2023  HISTORY: Left shoulder pain. Evaluate for septic arthritis. Fluoroscopic left shoulder arthrocentesis. Technique/procedure: The risks, benefits, and alternatives were discussed with the patient. Oral and written consent was obtained. The left shoulder was prepped and draped in the usual sterile fashion. Local anesthesia was provided with lidocaine. Moderate conscious sedation was provided with fentanyl and Versed. Vital signs were monitored by the radiology nurse. Total sedation time 23 minutes. Using fluoroscopic guidance, a 20-gauge spinal needle was advanced into the left shoulder joint space. Positioning was confirmed with injection of 2 mL of Omnipaque 300 contrast. Approximately 6 mL of yellow serous fluid was aspirated. 10 mL lidocaine was administered intra-articularly for attempted pain relief. No acute complication. Estimated blood loss less than 1 mL. 1. Successful aspiration of the left shoulder with 6 mL of yellow serous fluid obtained.     XR SHOULDER LEFT (MIN 2 VIEWS)    Result Date:

## 2023-10-28 ENCOUNTER — APPOINTMENT (OUTPATIENT)
Dept: MRI IMAGING | Age: 53
DRG: 556 | End: 2023-10-28
Payer: COMMERCIAL

## 2023-10-28 LAB
BACTERIA BLD CULT ORG #2: NORMAL
BACTERIA BLD CULT: NORMAL
MRSA DNA SPEC QL NAA+PROBE: NORMAL

## 2023-10-28 PROCEDURE — 6370000000 HC RX 637 (ALT 250 FOR IP): Performed by: ORTHOPAEDIC SURGERY

## 2023-10-28 PROCEDURE — 1200000000 HC SEMI PRIVATE

## 2023-10-28 PROCEDURE — 6370000000 HC RX 637 (ALT 250 FOR IP): Performed by: STUDENT IN AN ORGANIZED HEALTH CARE EDUCATION/TRAINING PROGRAM

## 2023-10-28 PROCEDURE — 6370000000 HC RX 637 (ALT 250 FOR IP): Performed by: NURSE PRACTITIONER

## 2023-10-28 PROCEDURE — 6370000000 HC RX 637 (ALT 250 FOR IP): Performed by: INTERNAL MEDICINE

## 2023-10-28 PROCEDURE — 2580000003 HC RX 258: Performed by: NURSE PRACTITIONER

## 2023-10-28 PROCEDURE — 6360000002 HC RX W HCPCS: Performed by: NURSE PRACTITIONER

## 2023-10-28 PROCEDURE — 73221 MRI JOINT UPR EXTREM W/O DYE: CPT

## 2023-10-28 RX ORDER — LORAZEPAM 1 MG/1
1 TABLET ORAL ONCE
Status: COMPLETED | OUTPATIENT
Start: 2023-10-28 | End: 2023-10-28

## 2023-10-28 RX ORDER — LORAZEPAM 2 MG/ML
2 INJECTION INTRAMUSCULAR ONCE
Status: DISCONTINUED | OUTPATIENT
Start: 2023-10-28 | End: 2023-10-30 | Stop reason: HOSPADM

## 2023-10-28 RX ORDER — LINEZOLID 600 MG/1
600 TABLET, FILM COATED ORAL EVERY 12 HOURS SCHEDULED
Status: DISCONTINUED | OUTPATIENT
Start: 2023-10-28 | End: 2023-10-30 | Stop reason: HOSPADM

## 2023-10-28 RX ADMIN — VALSARTAN 160 MG: 160 TABLET, FILM COATED ORAL at 09:32

## 2023-10-28 RX ADMIN — PANTOPRAZOLE SODIUM 40 MG: 40 TABLET, DELAYED RELEASE ORAL at 06:51

## 2023-10-28 RX ADMIN — LINEZOLID 600 MG: 600 TABLET, FILM COATED ORAL at 21:54

## 2023-10-28 RX ADMIN — TIZANIDINE 4 MG: 4 TABLET ORAL at 13:40

## 2023-10-28 RX ADMIN — TIZANIDINE 4 MG: 4 TABLET ORAL at 21:54

## 2023-10-28 RX ADMIN — SODIUM CHLORIDE, PRESERVATIVE FREE 10 ML: 5 INJECTION INTRAVENOUS at 09:30

## 2023-10-28 RX ADMIN — HYDROCHLOROTHIAZIDE 25 MG: 25 TABLET ORAL at 09:32

## 2023-10-28 RX ADMIN — VANCOMYCIN HYDROCHLORIDE 1500 MG: 10 INJECTION, POWDER, LYOPHILIZED, FOR SOLUTION INTRAVENOUS at 13:43

## 2023-10-28 RX ADMIN — ATORVASTATIN CALCIUM 20 MG: 20 TABLET, FILM COATED ORAL at 21:54

## 2023-10-28 RX ADMIN — TIZANIDINE 4 MG: 4 TABLET ORAL at 09:32

## 2023-10-28 RX ADMIN — VANCOMYCIN HYDROCHLORIDE 1500 MG: 10 INJECTION, POWDER, LYOPHILIZED, FOR SOLUTION INTRAVENOUS at 01:22

## 2023-10-28 RX ADMIN — PREDNISONE 20 MG: 20 TABLET ORAL at 21:54

## 2023-10-28 RX ADMIN — LORAZEPAM 1 MG: 1 TABLET ORAL at 20:04

## 2023-10-28 RX ADMIN — PREDNISONE 20 MG: 20 TABLET ORAL at 09:34

## 2023-10-28 ASSESSMENT — PAIN DESCRIPTION - FREQUENCY
FREQUENCY: INTERMITTENT
FREQUENCY: INTERMITTENT

## 2023-10-28 ASSESSMENT — PAIN SCALES - GENERAL
PAINLEVEL_OUTOF10: 1
PAINLEVEL_OUTOF10: 3
PAINLEVEL_OUTOF10: 1
PAINLEVEL_OUTOF10: 1
PAINLEVEL_OUTOF10: 3

## 2023-10-28 ASSESSMENT — PAIN DESCRIPTION - PAIN TYPE
TYPE: ACUTE PAIN
TYPE: ACUTE PAIN

## 2023-10-28 ASSESSMENT — PAIN DESCRIPTION - DESCRIPTORS
DESCRIPTORS: ACHING;SHOOTING
DESCRIPTORS: ACHING
DESCRIPTORS: ACHING
DESCRIPTORS: ACHING;SHOOTING

## 2023-10-28 ASSESSMENT — PAIN DESCRIPTION - LOCATION
LOCATION: SHOULDER
LOCATION: ARM;SHOULDER
LOCATION: SHOULDER
LOCATION: ARM;SHOULDER
LOCATION: SHOULDER

## 2023-10-28 ASSESSMENT — PAIN DESCRIPTION - ORIENTATION
ORIENTATION: LEFT

## 2023-10-28 ASSESSMENT — PAIN DESCRIPTION - ONSET
ONSET: GRADUAL
ONSET: GRADUAL

## 2023-10-28 NOTE — CONSULTS
Clinical Pharmacy Progress Note    Vancomycin has been discontinued - Pharmacy will sign off on dosing  If resumed, please re-consult Pharmacy     Please call with questions:  [phone] ([address] [place], [place] [address]set [place])    Osmar Tabor. Prema BARTHOLOMEW    10/28/2023 6:06 PM

## 2023-10-28 NOTE — PLAN OF CARE
Problem: Pain  Goal: Verbalizes/displays adequate comfort level or baseline comfort level  Outcome: Progressing  Flowsheets (Taken 10/27/2023 2352)  Verbalizes/displays adequate comfort level or baseline comfort level:   Encourage patient to monitor pain and request assistance   Assess pain using appropriate pain scale  Note: Patient denies pain at his time. VSS. Will continue to monitor comfort. Problem: Discharge Planning  Goal: Discharge to home or other facility with appropriate resources  Outcome: Progressing  Flowsheets (Taken 10/27/2023 2352)  Discharge to home or other facility with appropriate resources: Identify barriers to discharge with patient and caregiver     Problem: Safety - Adult  Goal: Free from fall injury  Outcome: Progressing  Note: Patient is ambulatory. Non skid socks on. Patient in bed with call light and personal belongings in reach.

## 2023-10-28 NOTE — PLAN OF CARE
Problem: Pain  Goal: Verbalizes/displays adequate comfort level or baseline comfort level  10/28/2023 1147 by Cm Badillo RN  Outcome: Progressing     Problem: Discharge Planning  Goal: Discharge to home or other facility with appropriate resources  10/28/2023 1147 by Cm Badillo RN  Outcome: Progressing     Problem: Safety - Adult  Goal: Free from fall injury  10/28/2023 1147 by Cm Badillo RN  Outcome: Progressing

## 2023-10-28 NOTE — PROGRESS NOTES
Clinical Pharmacy Progress Note    Vancomycin - Management by Pharmacy    Consult Date(s): 10/27/23  Consulting Provider(s): Sandra West NP    Assessment / Plan  Potential SSTI of LUE / Cellulitis - Vancomycin  Concurrent Antimicrobials: None at this time  Day of Vanc Therapy / Ordered Duration: Day #2 of 7  Current Dosing Method: Bayesian-Guided AUC Dosing  Therapeutic Goal: -600 mg/L*hr  Current Dose / Plan:   Pt's SCr remains stable at 0.5 mg/dL as of yesterday (10/27), no new SCr obtained today  Currently on maintenance regimen of vancomycin 1500 mg IV q12h at this time  Selected regimen predicts an AUC of 545 mg/L*hr with steady state trough of 16.9 mg/L  Will plan to continue current regimen  Will plan to obtain a random level tomorrow AM (10/29, ordered) to assess kinetics of current regimen  Will continue to monitor clinical condition and make adjustments to regimen as appropriate    Thanks for consulting pharmacy,    Eder Byrnes, PharmD, [facility]  Wireless: [id number]  10/28/2023 12:12 PM      Interval update:  ID consulted, suspects contact dermatitis over cellulitis given acute onset and rapid spread of rash in setting of severe latex allergy. No purulence noted, Zosyn discontinued per ID recs. MRI of L shoulder ordered, not yet completed. Patient remains hemodynamically stable, afebrile and on room air at this time. Subjective/Objective: Markos Kennedy is a 46 y.o. female with a PMHx significant for HTN, HLD, YORDAN, and lymphedema who is admitted with left shoulder pain. Pharmacy is consulted to dose Vancomycin.     Ht Readings from Last 1 Encounters:   10/24/23 1.803 m (5' 11\")     Wt Readings from Last 1 Encounters:   10/24/23 105.6 kg (232 lb 14.4 oz)     Current & Prior Antimicrobial Regimen(s):  Zosyn (10/27)  Vancomycin IV - Pharmacy to dose  2000 mg IV x1 10/27  1500 mg IV q12h (10/28-current)    Vancomycin Level(s) / Doses:  Date Time

## 2023-10-28 NOTE — PROGRESS NOTES
Pt requesting to take her weekly at home [facility]FORT SHEILA injection. NP messaged and approved.

## 2023-10-28 NOTE — PROGRESS NOTES
V2.0    [facility] [place] Progress Note      Name:  Karla Flanagan /Age/Sex: 1970  (48 y.o. female)   MRN & CSN:  [id number] & [id number] Encounter Date/Time: 10/28/2023 3:03 PM EDT   Location:  [facility]- PCP: Staci Shahid MD     Attending:Shabbir, Lajean Hurter, MD       Hospital Day: 5    Assessment and Recommendations   Karla Flanagan is a 46 y.o. female with pmh of left shoulder calcific tendinitis, left lower extremity lymphedema, hypertension, hyperlipidemia, YORDAN, who presents to the ED  who presents with Shoulder pain, left      Plan:     Acute left shoulder pain  History of calcific tendinopathy  Hypertension  Hyperlipidemia  Left lower extremity lymphedema  Hyponatremia  Hypokalemia  Chronic atraumatic left shoulder pain with requisite tenderness and very limited range of motion; s/p IR guided aspiration left shoulder with no growth. This should be monitored for 14 days to rule out C acnes per Ortho. No surgical intervention. Outpatient corticosteroid injection once symptoms tolerable. Patient was also seen by Dr. Karlie Posey at bedside; MRI of left shoulder without contrast is ordered and is pending. We can try IM Decadron and IM Toradol.;  - Pain control with Tylenol, Norco, Dilaudid, tizanidine. Gentle IVF rehydration. Monitor BMP and replete electrolytes as necessary. - Continue rest of chronic home medications    Left upper extremity redness per ID chances of cellulitis are less as there was sudden worsening of redness. They continued with vancomycin and discontinued Zosyn-times have improved  Diet ADULT DIET;  Regular   DVT Prophylaxis [] Lovenox, []  Heparin, [] SCDs, [] Ambulation,  [] Eliquis, [] Xarelto  [] Coumadin   Code Status Full Code   Disposition From: Home  Expected Disposition: Home  Estimated Date of Discharge: 1 to 2 days  Patient requires continued admission due to acute on chronic atraumatic left shoulder pain possible acute septic arthritis versus severe adhesive capsulitis Daily      Infusions:    sodium chloride      sodium chloride 25 mL/hr at 10/27/23 1427    lactated ringers IV soln 75 mL/hr at 10/27/23 1725     PRN Meds: sodium chloride flush, 5-40 mL, PRN  sodium chloride, 25 mL, PRN  oxyCODONE, 5 mg, Q4H PRN  ketorolac, 15 mg, Q6H PRN  HYDROmorphone, 1 mg, Q4H PRN  sodium chloride flush, 5-40 mL, PRN  sodium chloride, , PRN  ondansetron, 4 mg, Q8H PRN   Or  ondansetron, 4 mg, Q6H PRN  polyethylene glycol, 17 g, Daily PRN  acetaminophen, 650 mg, Q6H PRN   Or  acetaminophen, 650 mg, Q6H PRN  potassium chloride, 40 mEq, PRN   Or  potassium alternative oral replacement, 40 mEq, PRN   Or  potassium chloride, 10 mEq, PRN  magnesium sulfate, 2,000 mg, PRN        Labs and Imaging   FL ARTHR/ASP/INJ MAJOR JT/BURSA LT WO US    Result Date: 10/25/2023  HISTORY: Left shoulder pain. Evaluate for septic arthritis. Fluoroscopic left shoulder arthrocentesis. Technique/procedure: The risks, benefits, and alternatives were discussed with the patient. Oral and written consent was obtained. The left shoulder was prepped and draped in the usual sterile fashion. Local anesthesia was provided with lidocaine. Moderate conscious sedation was provided with fentanyl and Versed. Vital signs were monitored by the radiology nurse. Total sedation time 23 minutes. Using fluoroscopic guidance, a 20-gauge spinal needle was advanced into the left shoulder joint space. Positioning was confirmed with injection of 2 mL of Omnipaque 300 contrast. Approximately 6 mL of yellow serous fluid was aspirated. 10 mL lidocaine was administered intra-articularly for attempted pain relief. No acute complication. Estimated blood loss less than 1 mL. 1. Successful aspiration of the left shoulder with 6 mL of yellow serous fluid obtained. XR SHOULDER LEFT (MIN 2 VIEWS)    Result Date: 10/23/2023  Radiology exam is complete. No Radiologist dictation. Please follow up with ordering provider.        CBC:   Recent Labs

## 2023-10-29 LAB
ANION GAP SERPL CALCULATED.3IONS-SCNC: 12 MMOL/L (ref 3–16)
BUN SERPL-MCNC: 19 MG/DL (ref 7–20)
CALCIUM SERPL-MCNC: 9.1 MG/DL (ref 8.3–10.6)
CHLORIDE SERPL-SCNC: 102 MMOL/L (ref 99–110)
CO2 SERPL-SCNC: 24 MMOL/L (ref 21–32)
CREAT SERPL-MCNC: 0.7 MG/DL (ref 0.6–1.1)
GFR SERPLBLD CREATININE-BSD FMLA CKD-EPI: >60 ML/MIN/{1.73_M2}
GLUCOSE SERPL-MCNC: 107 MG/DL (ref 70–99)
POTASSIUM SERPL-SCNC: 4 MMOL/L (ref 3.5–5.1)
SODIUM SERPL-SCNC: 138 MMOL/L (ref 136–145)
VANCOMYCIN SERPL-MCNC: 5 UG/ML

## 2023-10-29 PROCEDURE — 80048 BASIC METABOLIC PNL TOTAL CA: CPT

## 2023-10-29 PROCEDURE — 6370000000 HC RX 637 (ALT 250 FOR IP): Performed by: STUDENT IN AN ORGANIZED HEALTH CARE EDUCATION/TRAINING PROGRAM

## 2023-10-29 PROCEDURE — 6370000000 HC RX 637 (ALT 250 FOR IP): Performed by: INTERNAL MEDICINE

## 2023-10-29 PROCEDURE — 6370000000 HC RX 637 (ALT 250 FOR IP): Performed by: ORTHOPAEDIC SURGERY

## 2023-10-29 PROCEDURE — 80202 ASSAY OF VANCOMYCIN: CPT

## 2023-10-29 PROCEDURE — 36415 COLL VENOUS BLD VENIPUNCTURE: CPT

## 2023-10-29 PROCEDURE — 1200000000 HC SEMI PRIVATE

## 2023-10-29 RX ADMIN — PREDNISONE 20 MG: 20 TABLET ORAL at 08:43

## 2023-10-29 RX ADMIN — TIZANIDINE 4 MG: 4 TABLET ORAL at 08:43

## 2023-10-29 RX ADMIN — PREDNISONE 20 MG: 20 TABLET ORAL at 20:24

## 2023-10-29 RX ADMIN — LINEZOLID 600 MG: 600 TABLET, FILM COATED ORAL at 08:43

## 2023-10-29 RX ADMIN — TIZANIDINE 4 MG: 4 TABLET ORAL at 15:14

## 2023-10-29 RX ADMIN — ATORVASTATIN CALCIUM 20 MG: 20 TABLET, FILM COATED ORAL at 20:25

## 2023-10-29 RX ADMIN — LINEZOLID 600 MG: 600 TABLET, FILM COATED ORAL at 20:24

## 2023-10-29 RX ADMIN — PANTOPRAZOLE SODIUM 40 MG: 40 TABLET, DELAYED RELEASE ORAL at 06:14

## 2023-10-29 RX ADMIN — VALSARTAN 160 MG: 160 TABLET, FILM COATED ORAL at 08:43

## 2023-10-29 RX ADMIN — HYDROCHLOROTHIAZIDE 25 MG: 25 TABLET ORAL at 08:43

## 2023-10-29 ASSESSMENT — PAIN SCALES - GENERAL
PAINLEVEL_OUTOF10: 2
PAINLEVEL_OUTOF10: 3
PAINLEVEL_OUTOF10: 2
PAINLEVEL_OUTOF10: 3
PAINLEVEL_OUTOF10: 3

## 2023-10-29 ASSESSMENT — PAIN DESCRIPTION - ORIENTATION
ORIENTATION: LEFT

## 2023-10-29 ASSESSMENT — PAIN DESCRIPTION - ONSET
ONSET: ON-GOING

## 2023-10-29 ASSESSMENT — PAIN DESCRIPTION - FREQUENCY
FREQUENCY: INTERMITTENT

## 2023-10-29 ASSESSMENT — PAIN DESCRIPTION - LOCATION
LOCATION: SHOULDER

## 2023-10-29 ASSESSMENT — PAIN DESCRIPTION - DESCRIPTORS
DESCRIPTORS: ACHING

## 2023-10-29 ASSESSMENT — PAIN - FUNCTIONAL ASSESSMENT
PAIN_FUNCTIONAL_ASSESSMENT: PREVENTS OR INTERFERES SOME ACTIVE ACTIVITIES AND ADLS
PAIN_FUNCTIONAL_ASSESSMENT: ACTIVITIES ARE NOT PREVENTED
PAIN_FUNCTIONAL_ASSESSMENT: NONE - DENIES PAIN
PAIN_FUNCTIONAL_ASSESSMENT: ACTIVITIES ARE NOT PREVENTED
PAIN_FUNCTIONAL_ASSESSMENT: PREVENTS OR INTERFERES SOME ACTIVE ACTIVITIES AND ADLS
PAIN_FUNCTIONAL_ASSESSMENT: ACTIVITIES ARE NOT PREVENTED

## 2023-10-29 ASSESSMENT — PAIN DESCRIPTION - PAIN TYPE
TYPE: ACUTE PAIN

## 2023-10-29 NOTE — PLAN OF CARE
Problem: Pain  Goal: Verbalizes/displays adequate comfort level or baseline comfort level  10/29/2023 0016 by Tal Horvath RN  Outcome: Progressing  Flowsheets (Taken 10/29/2023 0016)  Verbalizes/displays adequate comfort level or baseline comfort level:   Encourage patient to monitor pain and request assistance   Assess pain using appropriate pain scale   Administer analgesics based on type and severity of pain and evaluate response   Implement non-pharmacological measures as appropriate and evaluate response  Problem: Safety - Adult  Goal: Free from fall injury  10/29/2023 0016 by Tal Horvath RN  Outcome: Progressing  Flowsheets (Taken 10/29/2023 0016)  Free From Fall Injury: Based on caregiver fall risk screen, instruct family/caregiver to ask for assistance with transferring infant if caregiver noted to have fall risk factors    Problem: Discharge Planning  Goal: Discharge to home or other facility with appropriate resources  10/29/2023 0016 by Tal Horvath RN  Outcome: Progressing

## 2023-10-29 NOTE — PLAN OF CARE
Problem: Pain  Goal: Verbalizes/displays adequate comfort level or baseline comfort level  10/29/2023 1100 by Cordell Felty, RN  Outcome: Progressing     Problem: Discharge Planning  Goal: Discharge to home or other facility with appropriate resources  10/29/2023 1100 by Cordell Felty, RN  Outcome: Progressing     Problem: Safety - Adult  Goal: Free from fall injury  10/29/2023 1100 by Cordell Felty, RN  Outcome: Progressing

## 2023-10-29 NOTE — PROGRESS NOTES
4 Eyes Skin Assessment     NAME:  Ila Mata  YOB: 1970  MEDICAL RECORD NUMBER:  [id number]    The patient is being assessed for  Transfer to New Unit    I agree that at least one RN has performed a thorough Head to Toe Skin Assessment on the patient. ALL assessment sites listed below have been assessed. Areas assessed by both nurses:    Head, Face, Ears, Shoulders, Back, Chest, Arms, Elbows, Hands, Sacrum. Buttock, Coccyx, Ischium, Legs. Feet and Heels, and Under Medical Devices         Does the Patient have a Wound?  No noted wound(s)       Osmin Prevention initiated by RN: No  Wound Care Orders initiated by RN: No    Pressure Injury (Stage 3,4, Unstageable, DTI, NWPT, and Complex wounds) if present, place Wound referral order by RN under : No    New Ostomies, if present place, Ostomy referral order under : No     Nurse 1 eSignature: Electronically signed by Marisela Lugo RN on 10/29/23 at 6:34 PM EDT    **SHARE this note so that the co-signing nurse can place an eSignature**    Nurse 2 eSignature: Electronically signed by Joceline Thomas RN on 10/29/23 at 6:35 PM EDT

## 2023-10-29 NOTE — PROGRESS NOTES
Pt in bed watching tv with family. Bed in lowest position, brakes locked, and 2x side rails up. Call light in reach all needs addressed at this time.

## 2023-10-30 VITALS
SYSTOLIC BLOOD PRESSURE: 139 MMHG | WEIGHT: 233.5 LBS | HEIGHT: 71 IN | RESPIRATION RATE: 16 BRPM | HEART RATE: 83 BPM | BODY MASS INDEX: 32.69 KG/M2 | OXYGEN SATURATION: 96 % | DIASTOLIC BLOOD PRESSURE: 84 MMHG | TEMPERATURE: 97.7 F

## 2023-10-30 LAB
BACTERIA FLD AEROBE CULT: NORMAL
GRAM STN SPEC: NORMAL

## 2023-10-30 PROCEDURE — 6370000000 HC RX 637 (ALT 250 FOR IP): Performed by: INTERNAL MEDICINE

## 2023-10-30 PROCEDURE — 6370000000 HC RX 637 (ALT 250 FOR IP): Performed by: STUDENT IN AN ORGANIZED HEALTH CARE EDUCATION/TRAINING PROGRAM

## 2023-10-30 PROCEDURE — 6370000000 HC RX 637 (ALT 250 FOR IP): Performed by: ORTHOPAEDIC SURGERY

## 2023-10-30 RX ORDER — SEMAGLUTIDE 2.4 MG/.75ML
2.4 INJECTION, SOLUTION SUBCUTANEOUS
Qty: 3 ML | Refills: 2 | Status: SHIPPED | OUTPATIENT
Start: 2023-10-30

## 2023-10-30 RX ORDER — PREDNISONE 20 MG/1
20 TABLET ORAL 2 TIMES DAILY
Qty: 2 TABLET | Refills: 0 | Status: SHIPPED | OUTPATIENT
Start: 2023-10-30 | End: 2023-10-31

## 2023-10-30 RX ORDER — PREDNISONE 10 MG/1
10 TABLET ORAL DAILY
Qty: 5 TABLET | Refills: 0 | Status: SHIPPED | OUTPATIENT
Start: 2023-11-05 | End: 2023-11-10

## 2023-10-30 RX ORDER — PREDNISONE 10 MG/1
10 TABLET ORAL 2 TIMES DAILY
Qty: 10 TABLET | Refills: 0 | Status: SHIPPED | OUTPATIENT
Start: 2023-10-31 | End: 2023-11-05

## 2023-10-30 RX ORDER — SEMAGLUTIDE 1.7 MG/.75ML
1.7 INJECTION, SOLUTION SUBCUTANEOUS
Qty: 3 ML | Refills: 2 | OUTPATIENT
Start: 2023-10-30

## 2023-10-30 RX ADMIN — TIZANIDINE 4 MG: 4 TABLET ORAL at 10:07

## 2023-10-30 RX ADMIN — HYDROCHLOROTHIAZIDE 25 MG: 25 TABLET ORAL at 10:08

## 2023-10-30 RX ADMIN — VALSARTAN 160 MG: 160 TABLET, FILM COATED ORAL at 10:08

## 2023-10-30 RX ADMIN — LINEZOLID 600 MG: 600 TABLET, FILM COATED ORAL at 10:07

## 2023-10-30 RX ADMIN — ACETAMINOPHEN 650 MG: 325 TABLET ORAL at 10:06

## 2023-10-30 RX ADMIN — PREDNISONE 20 MG: 20 TABLET ORAL at 10:07

## 2023-10-30 ASSESSMENT — PAIN DESCRIPTION - LOCATION: LOCATION: SHOULDER

## 2023-10-30 ASSESSMENT — PAIN DESCRIPTION - ORIENTATION: ORIENTATION: LEFT

## 2023-10-30 ASSESSMENT — PAIN - FUNCTIONAL ASSESSMENT: PAIN_FUNCTIONAL_ASSESSMENT: ACTIVITIES ARE NOT PREVENTED

## 2023-10-30 ASSESSMENT — PAIN SCALES - GENERAL
PAINLEVEL_OUTOF10: 3
PAINLEVEL_OUTOF10: 3

## 2023-10-30 ASSESSMENT — PAIN DESCRIPTION - DESCRIPTORS: DESCRIPTORS: SHARP

## 2023-10-30 NOTE — PROGRESS NOTES
10/30/23 1053   Encounter Summary   Encounter Overview/Reason  Spiritual/Emotional Needs   Service Provided For: Patient and family together  (spouse at bedside)   Referral/Consult From: [address]; Children;Family members   Last Encounter  10/30/23  (visit w/pt&spouse, support, blessing, ke 10/30/23)   Complexity of Encounter Moderate   Begin Time 1030   End Time  1045   Total Time Calculated 15 min   Spiritual/Emotional needs   Type Spiritual Support   Rituals, Rites and Sacraments   Type Blessings   Assessment/Intervention/Outcome   Assessment Calm; Hopeful;Peaceful   Intervention Active listening;Explored/Affirmed feelings, thoughts, concerns;Sustaining Presence/Ministry of presence   Outcome Expressed feelings of Sultana, Peace and/or Love;Expressed Gratitude     Rev.  Esau Chinchilla

## 2023-10-30 NOTE — CARE COORDINATION
Case Management Assessment            Discharge Note                    Date / Time of Note: 10/30/2023 11:45 AM                  Discharge Note Completed by: ROMA Ventura    Patient Name: Ameena Ramírez   YOB: 1970  Diagnosis: Shoulder pain, left [M25.512]  Acute pain of left shoulder [M25.512]   Date / Time: 10/24/2023 12:56 PM    Current PCP: Lupe Pradhan MD  Clinic patient: Yes    Hospitalization in the last 30 days: No       Advance Directives:  Code Status: Full Code  [place] DNR form completed and on chart: No    Financial:  Payor: [place] / Plan: Maxine Morley PPO / Product Type: *No Product type* /      Pharmacy:    [facility] [id number] - [id number] [address] [address], [facility] [address] [facility] [place]  Phone: [phone] Fax: [phone]      Assistance purchasing medications?: Potential Assistance Purchasing Medications: No  Assistance provided by Case Management: None at this time    Does patient want to participate in local refill/ meds to beds program?: Yes    Meds To Beds General Rules:  1. Can ONLY be done Monday- Friday between 8:30am-5pm  2. Prescription(s) must be in pharmacy by 3pm to be filled same day  3. Copy of patient's insurance/ prescription drug card and patient face sheet must be sent along with the prescription(s)  4. Cost of Rx cannot be added to hospital bill. If financial assistance is needed, please contact unit  or ;  or  CANNOT provide pharmacy voucher for patients co-pays  5.  Patients can then  the prescription on their way out of the hospital at discharge, or pharmacy can deliver to the bedside if staff is available. (payment due at time of pick-up or delivery - cash, check, or card accepted)     Able to afford home medications/ co-pay costs: Yes    ADLS:  Current PT AM-PAC Score:   /24  Current OT AM-PAC Score:   /24      DISCHARGE Disposition: Home- No

## 2023-10-30 NOTE — TELEPHONE ENCOUNTER
Medication refill request for    Semaglutide-Weight Management (WEGOVY) 2.4 MG/0.75ML SOAJ SC injection [[id number]]     Order Details  Dose: 2.4 mg Route: SubCUTAneous Frequency: EVERY 7 DAYS   Dispense Quantity: 3 mL Refills: 0          Sig: Inject 2.4 mg into the skin every 7 days for 28 days         Start Date: 10/04/23 End Date: 11/01/23 after 4 doses   Written Date: 10/04/23 Expiration Date: 10/03/24   Providers    Authorizing Provider: Kaveh Ocampo MD NPI: [id number]   Ordering User:  Kaveh Ocampo MD            Last ov: 10/09/2023  Last labs: 10/29/2023  Next ov: N/A    Thank you

## 2023-11-01 ENCOUNTER — OFFICE VISIT (OUTPATIENT)
Dept: ORTHOPEDIC SURGERY | Age: 53
End: 2023-11-01

## 2023-11-01 DIAGNOSIS — M75.32 CALCIFIC TENDINITIS OF LEFT SHOULDER: Primary | ICD-10-CM

## 2023-11-01 DIAGNOSIS — G89.29 CHRONIC LEFT SHOULDER PAIN: ICD-10-CM

## 2023-11-01 DIAGNOSIS — M25.512 CHRONIC LEFT SHOULDER PAIN: ICD-10-CM

## 2023-11-01 RX ORDER — METHYLPREDNISOLONE ACETATE 40 MG/ML
80 INJECTION, SUSPENSION INTRA-ARTICULAR; INTRALESIONAL; INTRAMUSCULAR; SOFT TISSUE ONCE
Status: COMPLETED | OUTPATIENT
Start: 2023-11-01 | End: 2023-11-01

## 2023-11-01 RX ORDER — LIDOCAINE HYDROCHLORIDE 10 MG/ML
8 INJECTION, SOLUTION INFILTRATION; PERINEURAL ONCE
Status: COMPLETED | OUTPATIENT
Start: 2023-11-01 | End: 2023-11-01

## 2023-11-01 RX ADMIN — LIDOCAINE HYDROCHLORIDE 8 ML: 10 INJECTION, SOLUTION INFILTRATION; PERINEURAL at 16:37

## 2023-11-01 RX ADMIN — METHYLPREDNISOLONE ACETATE 80 MG: 40 INJECTION, SUSPENSION INTRA-ARTICULAR; INTRALESIONAL; INTRAMUSCULAR; SOFT TISSUE at 16:38

## 2023-11-01 NOTE — PROGRESS NOTES
[address]  History and Physical  Shoulder Pain    Date:  2023    Name:  Monica Costa  Address:  [address] [facility] [address]    :  1970      Age:   46 y.o.    SSN:  [id number]      Medical Record Number:  [id number]    Reason for Visit:    Shoulder Pain    HPI:   Monica Costa is a 46 y.o. female who presents to our office today for follow up of the left shoulder pain. Patient was previously seen last week due to severely aggravated left shoulder pain. She has failed multiple conservative measures thus far including formal physical therapy injections and oral anti-inflammatories. Her symptoms did become so symptoms dramatic in early last week, that she required an evaluation emergency department and eventual admission to the hospital.  At that time, patient was aspirated under ultrasound guidance for a possible septic joint, but the results has been negative to date. She did eventually obtain an MRI scan last Friday and is here today to review results of her MRI and for further evaluation. She does report that since her discharge from the hospital, she has had some improvement in regards to the symptoms of her shoulder. She has had improvement in both motion as well as pain but does have some limitations still in comparison to contralateral side. This is relatively chronic issue for the patient as she has been doing this on and off for the last 2 years or so. No fevers or chills. Has had some slight resolution of the overlying erythema of the shoulder girdle. Still does have difficulty with pain while sleeping at night. No notes on file    Review of Systems:  A 14 point review of systems available in the scanned medical record as documented by the patient and reviewed on 2023. The review is negative with the exception of those things mentioned in the History of Present Illness and Past Medical History.       Past Medical

## 2023-11-04 NOTE — PROGRESS NOTES
Physician Progress Note      PATIENTDiabdulkadir Serrano  CSN #:                  [id number]  :                       1970  ADMIT DATE:       10/24/2023 12:56 PM  [address] [place] [address] DATE:        10/30/2023 11:00 AM  RESPONDING  PROVIDER #:        Red Armstrong          QUERY TEXT:    Pt admitted with left shoulder pain. Pt noted to have hx of calcific   tendinitis. If possible, please document in progress notes and discharge   summary the relationship, if any, between left shoulder pain and calcific   tendinitis. The medical record reflects the following:  Risk Factors: 46 y.o. female with hx of HTN, LLE lymphedema, HLD, YORDAN,   Calcific tendinopathy  Clinical Indicators: Presented with left shoulder pain  Treatment: Pain management, ID consult, imaging, Fluoroscopic left shoulder   aspiration by IR  Options provided:  -- Left shoulder pain likely related to calcific tendinitis  -- Left shoulder pain unrelated to calcific tendinitis  -- Other - I will add my own diagnosis  -- Disagree - Not applicable / Not valid  -- Disagree - Clinically unable to determine / Unknown  -- Refer to Clinical Documentation Reviewer    PROVIDER RESPONSE TEXT:    This patient has *left shoulder likely due to calcific tendinitis.     Query created by: Shayla Maldonado on 2023 1:44 PM      Electronically signed by:  Red Armstrong 2023 10:34 AM

## 2023-11-05 DIAGNOSIS — I10 ESSENTIAL HYPERTENSION: ICD-10-CM

## 2023-11-06 LAB
BACTERIA FLD AEROBE CULT: NORMAL
GRAM STN SPEC: NORMAL

## 2023-11-06 RX ORDER — VALSARTAN AND HYDROCHLOROTHIAZIDE 160; 25 MG/1; MG/1
1 TABLET ORAL DAILY
Qty: 90 TABLET | Refills: 1 | Status: SHIPPED | OUTPATIENT
Start: 2023-11-06

## 2023-11-06 NOTE — TELEPHONE ENCOUNTER
Requested Prescriptions     Pending Prescriptions Disp Refills    valsartan-hydroCHLOROthiazide (DIOVAN-HCT) 160-25 MG per tablet 90 tablet 1     Sig: Take 1 tablet by mouth daily         Last OV: 10/9/23    Last labs: 10/27/23     F/u: None

## 2023-11-07 ENCOUNTER — HOSPITAL ENCOUNTER (OUTPATIENT)
Dept: PHYSICAL THERAPY | Age: 53
Setting detail: THERAPIES SERIES
Discharge: HOME | End: 2023-11-07
Payer: COMMERCIAL

## 2023-11-07 DIAGNOSIS — M25.612 SHOULDER STIFFNESS, LEFT: ICD-10-CM

## 2023-11-07 DIAGNOSIS — G89.29 CHRONIC LEFT SHOULDER PAIN: Primary | ICD-10-CM

## 2023-11-07 DIAGNOSIS — M25.512 CHRONIC LEFT SHOULDER PAIN: Primary | ICD-10-CM

## 2023-11-07 PROCEDURE — 97110 THERAPEUTIC EXERCISES: CPT

## 2023-11-07 PROCEDURE — 97161 PT EVAL LOW COMPLEX 20 MIN: CPT

## 2023-11-07 NOTE — PLAN OF CARE
Adjusted    Therapist goals for Patient:   Short Term Goals: To be achieved in: 4 weeks  Independent in HEP and progression per patient tolerance, in order to progress toward full function and prevent re-injury. Status: [] Progressing: [] Met: [] Not Met: [] Adjusted  Patient will have a decrease in pain to 2/10 to help  facilitate improvement in movement, function, and ADLs as indicated by functional deficits. Status: [] Progressing: [] Met: [] Not Met: [] Adjusted    Long Term Goals: To be achieved in: 12 weeks  Disability index score of 20% or less for the SPADI to assist with return to prior level of function. Status: [] Progressing: [] Met: [] Not Met: [] Adjusted  Improve L shoulder AROM to [employer] [facility] [place] to allow for proper joint functioning as indicated by patients functional deficits. Status: [] Progressing: [] Met: [] Not Met: [] Adjusted  Pt to improve strength to [employer] [facility] [place] of L rotator cuff, shoulder elevators, biceps, and triceps to allow for proper muscle and joint use in functional mobility, ADLs and prior level of function   Status: [] Progressing: [] Met: [] Not Met: [] Adjusted  Patient will return to Reaching activities, Bathing/Grooming, and Computer work without increased symptoms or restriction to work towards return to prior level of function. Status: [] Progressing: [] Met: [] Not Met: [] Adjusted    TREATMENT PLAN     Frequency/Duration: 1-2x/week for  12  weeks for the following treatment interventions:    Interventions:  [x] Therapeutic exercise including: strength training, ROM, including postural re-education. [x] NMR activation and proprioception, including postural re-education.     [x] Manual therapy as indicated to include: PROM, Gr I-IV mobilizations, STM, and Dry Needling/IASTM  [x] Modalities as needed that may include: Cryotherapy, Electrical Stimulation, and Thermal Agents  [x] Patient education on joint protection, postural re-education, activity modification, progression of

## 2023-11-09 ENCOUNTER — HOSPITAL ENCOUNTER (OUTPATIENT)
Dept: PHYSICAL THERAPY | Age: 53
Setting detail: THERAPIES SERIES
Discharge: HOME | End: 2023-11-09
Payer: COMMERCIAL

## 2023-11-09 PROCEDURE — 97110 THERAPEUTIC EXERCISES: CPT

## 2023-11-09 PROCEDURE — 97140 MANUAL THERAPY 1/> REGIONS: CPT

## 2023-11-09 NOTE — FLOWSHEET NOTE
[address] [place] [address] and Therapy [address] [place] [address]., [address] [facility], [address] [place] [address] office: [phone] fax: [phone]      Physical Therapy: TREATMENT/PROGRESS NOTE   Patient: Conor Lira (62 y.o. female)   Treatment Date: 2023   :  1970 MRN: [id number]   Visit #: 2   Insurance Allowable Auth Needed   [place]  60 PCY []Yes    [x]No    Insurance: Payor: Sharron Bosch / [facility]: [facility] / Product Type: *No Product type* /   Insurance ID: [id number] - ([address] [place] [address])  Secondary Insurance (if applicable):    Treatment Diagnosis:     ICD-10-CM    1. Chronic left shoulder pain  M25.512     G89.29       2. Shoulder stiffness, left  M25.612          Medical Diagnosis:    Calcific tendinitis of left shoulder [M75.32]   Referring Physician: Perico Herrera MD  PCP: Cristy Serrano MD                             Plan of care signed (Y/N): N    Date of Patient follow up with Physician:      Progress Report/POC: NO  POC update due: (10 visits /OR [address], whichever is less)  2023     Preferred Language for Healthcare:   [x]English       []other:    SUBJECTIVE EXAMINATION     Patient Report/Comments:  Pt notes she sees improvements with motion pain.       Test used Initial score  2023   Pain Summary VAS 0-3/10 0-3/10   Functional questionnaire SPADI 50% deficit    Other: GH Flexion passive 120 (table slide)              OBJECTIVE EXAMINATION     Observation:     Test measurements: see eval    Exercises/Interventions:     Therapeutic Ex (75728)  resistance Sets/time Reps Notes/Cues/Progressions   Elbow flexion 3x10 1#   ^   Scap squeeze 10x10\"      shrugs 3x10      Arm hang 10x10\"   LUE   Table slide 10x10\"   LUE   Care ER 10x10\"   Start  supine   Cane press 3x10   Start  supine                                Manual Intervention (72606)  TIME     PROM, oscillations and STM  10'  Start  STM to subscap, UT, and posterior cuff, gentle joint

## 2023-11-13 LAB
BACTERIA FLD AEROBE CULT: NORMAL
GRAM STN SPEC: NORMAL

## 2023-11-14 ENCOUNTER — HOSPITAL ENCOUNTER (OUTPATIENT)
Dept: PHYSICAL THERAPY | Age: 53
Setting detail: THERAPIES SERIES
Discharge: HOME | End: 2023-11-14
Payer: COMMERCIAL

## 2023-11-14 PROCEDURE — 97110 THERAPEUTIC EXERCISES: CPT

## 2023-11-14 PROCEDURE — G0283 ELEC STIM OTHER THAN WOUND: HCPCS

## 2023-11-14 PROCEDURE — 97140 MANUAL THERAPY 1/> REGIONS: CPT

## 2023-11-14 NOTE — FLOWSHEET NOTE
[address] [facility] and Therapy [address] [place] [address]., [address] [facility], [address] [place] [address] office: [phone] fax: [phone]      Physical Therapy: TREATMENT/PROGRESS NOTE   Patient: Fausto Camp (49 y.o. female)   Treatment Date: 2023   :  1970 MRN: [id number]   Visit #: 3   Insurance Allowable Auth Needed   [place]  60 PCY []Yes    [x]No    Insurance: Payor: [place] / Plan: [employer] PPO / Product Type: *No Product type* /   Insurance ID: [id number] - ([address] [place] [address])  Secondary Insurance (if applicable):    Treatment Diagnosis:     ICD-10-CM    1. Chronic left shoulder pain  M25.512     G89.29       2. Shoulder stiffness, left  M25.612          Medical Diagnosis:    Calcific tendinitis of left shoulder [M75.32]   Referring Physician: Matilde Matthews MD  PCP: Valentin Amaya MD                             Plan of care signed (Y/N): N    Date of Patient follow up with Physician:      Progress Report/POC: NO  POC update due: (10 visits /OR [address], whichever is less)  2023     Preferred Language for Healthcare:   [x]English       []other:    SUBJECTIVE EXAMINATION     Patient Report/Comments:  Pt notes that her arm is a little sore today.       Test used Initial score  2023   Pain Summary VAS 0-3/10 0-6/10   Functional questionnaire SPADI 50% deficit    Other: GH Flexion passive 120 (table slide)              OBJECTIVE EXAMINATION     Observation:     Test measurements: see eval    Exercises/Interventions:     Therapeutic Ex (04747)  resistance Sets/time Reps Notes/Cues/Progressions   Elbow flexion 3x10 1#   ^   Scap squeeze 10x10\"      shrugs 3x10      Arm hang 10x10\"   LUE   Table slide 10x10\"   LUE   Care ER 10x10\"   Start  supine   Cane press 3x10   Start  supine    IR/ER Isometric 2x10x5\"   Start  into wall   Flexion/extension Isometric 2x10x5\"   Start  into wall                        Manual Intervention (63257)  TIME

## 2023-11-16 ENCOUNTER — HOSPITAL ENCOUNTER (OUTPATIENT)
Dept: PHYSICAL THERAPY | Age: 53
Setting detail: THERAPIES SERIES
Discharge: HOME | End: 2023-11-16
Payer: COMMERCIAL

## 2023-11-16 ENCOUNTER — HOSPITAL ENCOUNTER (OUTPATIENT)
Dept: MAMMOGRAPHY | Age: 53
Discharge: HOME | End: 2023-11-16
Payer: COMMERCIAL

## 2023-11-16 VITALS — WEIGHT: 233 LBS | BODY MASS INDEX: 32.5 KG/M2

## 2023-11-16 DIAGNOSIS — Z12.31 VISIT FOR SCREENING MAMMOGRAM: ICD-10-CM

## 2023-11-16 PROCEDURE — 77063 BREAST TOMOSYNTHESIS BI: CPT

## 2023-11-16 PROCEDURE — 97110 THERAPEUTIC EXERCISES: CPT

## 2023-11-16 PROCEDURE — G0283 ELEC STIM OTHER THAN WOUND: HCPCS

## 2023-11-16 PROCEDURE — 97140 MANUAL THERAPY 1/> REGIONS: CPT

## 2023-11-16 NOTE — FLOWSHEET NOTE
identified by an ICD-10 code that has a direct and significant impact on the need for therapy. (Significantly impacts the rate of recovery and is associated with a primary condition.)     Treatment/Activity Tolerance:  [x] Patient tolerated treatment well [] Patient limited by fatique  [] Patient limited by pain  [] Patient limited by other medical complications  [] Other:     Return to Play: NA    Prognosis for POC: [x] Good [] Fair  [] Poor    Patient requires continued skilled intervention: [x] Yes  [] No      GOALS        Patient stated goal: Pt would like to return to swimming, working and being more active in general.   Status: [] Progressing: [] Met: [] Not Met: [] Adjusted     Therapist goals for Patient:   Short Term Goals: To be achieved in: 4 weeks  Independent in HEP and progression per patient tolerance, in order to progress toward full function and prevent re-injury. Status: [] Progressing: [] Met: [] Not Met: [] Adjusted  Patient will have a decrease in pain to 2/10 to help  facilitate improvement in movement, function, and ADLs as indicated by functional deficits. Status: [] Progressing: [] Met: [] Not Met: [] Adjusted     Long Term Goals: To be achieved in: 12 weeks  Disability index score of 20% or less for the SPADI to assist with return to prior level of function. Status: [] Progressing: [] Met: [] Not Met: [] Adjusted  Improve L shoulder AROM to [facility] [place] to allow for proper joint functioning as indicated by patients functional deficits.   Status: [] Progressing: [] Met: [] Not Met: [] Adjusted  Pt to improve strength to [facility] [place] of L rotator cuff, shoulder elevators, biceps, and triceps to allow for proper muscle and joint use in functional mobility, ADLs and prior level of function   Status: [] Progressing: [] Met: [] Not Met: [] Adjusted  Patient will return to Reaching activities, Bathing/Grooming, and Computer work without increased symptoms or restriction

## 2023-11-21 ENCOUNTER — HOSPITAL ENCOUNTER (OUTPATIENT)
Dept: PHYSICAL THERAPY | Age: 53
Setting detail: THERAPIES SERIES
Discharge: HOME | End: 2023-11-21
Payer: COMMERCIAL

## 2023-11-21 NOTE — FLOWSHEET NOTE
Physical Therapy  Cancellation/No-show Note  Patient Name:  Micki Velazco  :  1970   Date:  2023  Cancelled visits to date: 1  No-shows to date: 0    For today's appointment patient:  [x]  Cancelled  []  Rescheduled appointment  []  No-show     Reason given by patient:  []  Patient ill  []  Conflicting appointment  []  No transportation    []  Conflict with work  []  No reason given  []  Other:     Comments:      Electronically signed by:  Tammy Keith PT, PT

## 2023-11-28 ENCOUNTER — TELEPHONE (OUTPATIENT)
Age: 53
End: 2023-11-28

## 2023-11-28 ENCOUNTER — HOSPITAL ENCOUNTER (OUTPATIENT)
Dept: PHYSICAL THERAPY | Age: 53
Setting detail: THERAPIES SERIES
Discharge: HOME | End: 2023-11-28
Payer: COMMERCIAL

## 2023-11-28 ENCOUNTER — OFFICE VISIT (OUTPATIENT)
Dept: ORTHOPEDIC SURGERY | Age: 53
End: 2023-11-28
Payer: COMMERCIAL

## 2023-11-28 VITALS — BODY MASS INDEX: 32.62 KG/M2 | HEIGHT: 71 IN | WEIGHT: 233 LBS

## 2023-11-28 DIAGNOSIS — M75.32 CALCIFIC TENDINITIS OF LEFT SHOULDER: Primary | ICD-10-CM

## 2023-11-28 DIAGNOSIS — M75.02 ADHESIVE CAPSULITIS OF LEFT SHOULDER: ICD-10-CM

## 2023-11-28 PROCEDURE — 99213 OFFICE O/P EST LOW 20 MIN: CPT | Performed by: ORTHOPAEDIC SURGERY

## 2023-11-28 PROCEDURE — 97140 MANUAL THERAPY 1/> REGIONS: CPT

## 2023-11-28 PROCEDURE — 97110 THERAPEUTIC EXERCISES: CPT

## 2023-11-28 RX ORDER — SEMAGLUTIDE 2.4 MG/.75ML
2.4 INJECTION, SOLUTION SUBCUTANEOUS
Qty: 3 ML | Refills: 2 | Status: SHIPPED | OUTPATIENT
Start: 2023-11-28

## 2023-11-28 NOTE — TELEPHONE ENCOUNTER
Spoke with pharmacy who actually was able to push med through with discount applied. However, they cannot guarantee when it will be delivered due to national shortages. Called pt to inform, she said she will be shopping around to other [employer] as when she does this, she can always find it in stock at another store.

## 2023-11-28 NOTE — TELEPHONE ENCOUNTER
Requested Prescriptions     Pending Prescriptions Disp Refills    Semaglutide-Weight Management (WEGOVY) 2.4 MG/0.75ML SOAJ SC injection 3 mL 2     Sig: Inject 2.4 mg into the skin every 7 days         Last OV: 6/20/2023    Last labs: 10/29/2023     F/u: no

## 2023-11-28 NOTE — FLOWSHEET NOTE
lymphatic drainage, manual traction) for the purpose of modulating pain, promoting relaxation,  increasing ROM, reducing/eliminating soft tissue swelling/inflammation/restriction, improving soft tissue extensibility and allowing for proper ROM for normal function with self care, mobility, lifting and ambulation    TREATMENT PLAN   Plan: Cont POC- Continue emphasis/focus on exercise progression, modulating pain, promoting relaxation, and increasing ROM. Next visit plan to add new exercises     Electronically Signed by Griselda Chatterjee PT              Date: 11/28/2023     Note: If patient does not return for scheduled/recommended follow up visits, this note will serve as a discharge from care along with the most recent update on progress.

## 2023-11-28 NOTE — PROGRESS NOTES
Active Range of Motion: Forward Elevation 130 with slight hitch, Internal Rotation L4    Passive Range of Motion: External Rotation 40 vs 60    Strength:  External Rotation 4+/5, Internal Rotation 4+/5    Special Tests:  No Ariel muscle deformity. Neurovascular: Sensation to light touch is intact, no motor deficits, palpable radial pulses 2+      Radiology:     No new XR obtained at this time. Assessment :  Ms. Iban Brown is a pleasant, 46 y.o. patient with adhesive capsulitis as well as calcific tendonitis of the biceps. Impression:  Encounter Diagnoses   Name Primary? Calcific tendinitis of left shoulder Yes    Adhesive capsulitis of left shoulder        Office Procedures:  Orders Placed This Encounter   Procedures    [address] [facility]     Referral Priority:   Routine     Referral Type:   Eval and Treat     Referral Reason:   Specialty Services Required     Requested Specialty:   Physical Therapist     Number of Visits Requested:   1       Treatment Plan:  Iban Brown  is doing well in non-operative treatment. We will stay the course. It is too soon to consider a repeat injection. We offered to call in a 15 day course of oral steroids - she declined this today. If she changes her mind she can call our office and we can certainly call this in to her pharmacy. We recommend this patient continue in physical therapy. A new physical therapy letter was documented in [facility] today. She would like to go back to work. We will release her today to go back with no restrictions. We will see Ila back in 4-6 weeks and/or as needed. All questions were answered to patient's satisfaction and She was encouraged to call with any further questions or concerns. Iban Brown is in agreement with this plan.     11/28/2023  10:07 AM    Chris Mcmahon ATC  [facility]letic [address] [place] [address]    During this examination, I,

## 2023-11-30 ENCOUNTER — HOSPITAL ENCOUNTER (OUTPATIENT)
Dept: PHYSICAL THERAPY | Age: 53
Setting detail: THERAPIES SERIES
Discharge: HOME | End: 2023-11-30
Payer: COMMERCIAL

## 2023-11-30 PROCEDURE — 97140 MANUAL THERAPY 1/> REGIONS: CPT

## 2023-11-30 PROCEDURE — 97110 THERAPEUTIC EXERCISES: CPT

## 2023-11-30 NOTE — FLOWSHEET NOTE
[address] [facility] and Therapy [address] [place] [address]., [address] [facility], [address] [place] [address] office: [phone] fax: [phone]      Physical Therapy: TREATMENT/PROGRESS NOTE   Patient: Conor Lira (38 y.o. female)   Treatment Date: 2023   :  1970 MRN: [id number]   Visit #: 6   Insurance Allowable Auth Needed   [place]  60 PCY []Yes    [x]No    Insurance: Payor: [facility] / Plan: [facility] PPO / Product Type: *No Product type* /   Insurance ID: [id number] - ([address] [place] [address])  Secondary Insurance (if applicable):    Treatment Diagnosis:     ICD-10-CM    1. Chronic left shoulder pain  M25.512     G89.29       2. Shoulder stiffness, left  M25.612          Medical Diagnosis:    Calcific tendinitis of left shoulder [M75.32]   Referring Physician: Perico Herrera MD  PCP: Cristy Serrano MD                             Plan of care signed (Y/N): N    Date of Patient follow up with Physician:      Progress Report/POC: NO  POC update due: (10 visits /OR [address], whichever is less)  2023     [employer]    Preferred Language for Healthcare:   [x]English       []other:    SUBJECTIVE EXAMINATION     Patient Report/Comments:  Pt notes she continues to feel better. She notes she worked yesterday and was able to do okay but had to take breaks.       Test used Initial score  2023   Pain Summary VAS 0-3/10 0-4/10   Functional questionnaire SPADI 50% deficit    Other: GH Flexion passive 120 (table slide)              OBJECTIVE EXAMINATION     Observation:     Test measurements: see eval    Exercises/Interventions:     Therapeutic Ex (06537)  resistance Sets/time Reps Notes/Cues/Progressions   Elbow flexion 2x10 3#   ^   10x10\"      Pulleys 2x10   Start    Table slide 10x10\"    roll away   Cane ER 10x10\"   Start  supine   Cane press 3x10   ^ 1.5#   IR/ER Isometric 3x10x5\"    red latex free band step out                        Manual

## 2023-12-01 DIAGNOSIS — E78.00 PURE HYPERCHOLESTEROLEMIA: ICD-10-CM

## 2023-12-01 RX ORDER — ATORVASTATIN CALCIUM 20 MG/1
20 TABLET, FILM COATED ORAL DAILY
Qty: 90 TABLET | Refills: 1 | Status: SHIPPED | OUTPATIENT
Start: 2023-12-01

## 2023-12-01 NOTE — TELEPHONE ENCOUNTER
LAST VISIT 06/20/2023 SM, ACUTE VISIT 10/09/2023 MM, NEXT VISIT NONE.      Component      Latest Ref Rng 1/13/2023   Cholesterol, Total      0 - 199 mg/dL 192    Triglycerides      0 - 150 mg/dL 63    HDL Cholesterol      40 - 60 mg/dL 56    LDL Calculated      <100 mg/dL 123 (H)    VLDL Cholesterol Calculated      Not Established mg/dL 13

## 2023-12-05 ENCOUNTER — HOSPITAL ENCOUNTER (OUTPATIENT)
Dept: PHYSICAL THERAPY | Age: 53
Setting detail: THERAPIES SERIES
Discharge: HOME | End: 2023-12-05
Payer: COMMERCIAL

## 2023-12-05 ENCOUNTER — TELEPHONE (OUTPATIENT)
Dept: ORTHOPEDIC SURGERY | Age: 53
End: 2023-12-05

## 2023-12-05 PROCEDURE — 97140 MANUAL THERAPY 1/> REGIONS: CPT

## 2023-12-05 PROCEDURE — 97110 THERAPEUTIC EXERCISES: CPT

## 2023-12-05 NOTE — TELEPHONE ENCOUNTER
Prescription Refill     Medication Name:  STEROIDS     Pharmacy: [facility] [id number] - [place], [place] - [address] [facility] [address] - P [phone] - F [phone]    Patient Contact Number:  [phone]    Pt STATES DR STOCKOTN SAID TO CALL IF SHE THOUGHT STEROIDS ARE NEEDED. PLEASE CALL INTO THE PHARMACY FOR HER.

## 2023-12-05 NOTE — FLOWSHEET NOTE
and ambulation    TREATMENT PLAN   Plan: Cont POC- Continue emphasis/focus on exercise progression, modulating pain, promoting relaxation, and increasing ROM. Next visit plan to add new exercises     Electronically Signed by James Tracy PT              Date: 12/05/2023     Note: If patient does not return for scheduled/recommended follow up visits, this note will serve as a discharge from care along with the most recent update on progress.

## 2023-12-06 PROBLEM — G47.33 OBSTRUCTIVE SLEEP APNEA SYNDROME: Chronic | Status: ACTIVE | Noted: 2022-01-19

## 2023-12-06 RX ORDER — PREDNISONE 10 MG/1
TABLET ORAL
Qty: 35 TABLET | Refills: 0 | Status: SHIPPED | OUTPATIENT
Start: 2023-12-06 | End: 2023-12-06

## 2023-12-07 ENCOUNTER — HOSPITAL ENCOUNTER (OUTPATIENT)
Dept: PHYSICAL THERAPY | Age: 53
Setting detail: THERAPIES SERIES
Discharge: HOME | End: 2023-12-07
Payer: COMMERCIAL

## 2023-12-07 PROCEDURE — 97140 MANUAL THERAPY 1/> REGIONS: CPT

## 2023-12-07 PROCEDURE — 97110 THERAPEUTIC EXERCISES: CPT

## 2023-12-07 NOTE — FLOWSHEET NOTE
[address] [facility] [employer] and Therapy [address] [place] [address]., [address] [facility], [address] [place] [address] office: [phone] fax: [phone]      Physical Therapy: TREATMENT/PROGRESS NOTE   Patient: Micki Velazco (42 y.o. female)   Treatment Date: 2023   :  1970 MRN: [id number]   Visit #: 8   Insurance Allowable Auth Needed   [place]  60 PCY []Yes    [x]No    Insurance: Payor: Ana María Moore / Plan: [place] PPO / Product Type: *No Product type* /   Insurance ID: [id number] - ([address] [place] [address])  Secondary Insurance (if applicable):    Treatment Diagnosis:     ICD-10-CM    1. Chronic left shoulder pain  M25.512     G89.29       2. Shoulder stiffness, left  M25.612          Medical Diagnosis:    Calcific tendinitis of left shoulder [M75.32]   Referring Physician: Yannick Sanchez MD  PCP: Ann Marie Rangel MD                             Plan of care signed (Y/N): Y    Date of Patient follow up with Physician:      Progress Report/POC: NO  POC update due: (10 visits /OR [address], whichever is less)  2023     [employer]    Preferred Language for Healthcare:   [x]English       []other:    SUBJECTIVE EXAMINATION     Patient Report/Comments:  Pt notes that she feels a little worse today after working. She notes that she has started her steroid pack and hopes that will help.       Test used Initial score  2023   Pain Summary VAS 0-3/10 6/10   Functional questionnaire SPADI 50% deficit    Other: GH Flexion passive 120 (table slide)              OBJECTIVE EXAMINATION     Observation:     Test measurements: see eval    Exercises/Interventions:     Therapeutic Ex (79634)  resistance Sets/time Reps Notes/Cues/Progressions   Elbow flexion 2x10 3#   ^   Scap squeeze 10x10\"    R sidelying   Pulleys 3x10   Start    10x10\"    roll away   Cane ER 10x10\"   Start  seated   1x10   ^ 1.5#   3x10x5\"    red latex free band step out   Sidelying ER 3x10    1#

## 2023-12-12 ENCOUNTER — HOSPITAL ENCOUNTER (OUTPATIENT)
Dept: PHYSICAL THERAPY | Age: 53
Setting detail: THERAPIES SERIES
Discharge: HOME | End: 2023-12-12
Payer: COMMERCIAL

## 2023-12-12 PROCEDURE — 97110 THERAPEUTIC EXERCISES: CPT

## 2023-12-12 PROCEDURE — 97140 MANUAL THERAPY 1/> REGIONS: CPT

## 2023-12-14 ENCOUNTER — HOSPITAL ENCOUNTER (OUTPATIENT)
Dept: PHYSICAL THERAPY | Age: 53
Setting detail: THERAPIES SERIES
Discharge: HOME | End: 2023-12-14
Payer: COMMERCIAL

## 2023-12-14 PROCEDURE — 97140 MANUAL THERAPY 1/> REGIONS: CPT

## 2023-12-14 PROCEDURE — 97110 THERAPEUTIC EXERCISES: CPT

## 2023-12-14 NOTE — FLOWSHEET NOTE
[facility] [address] [facility] [address] [facility], [address] [place] [address] office: [phone] fax: [phone]    Physical Therapy: TREATMENT/PROGRESS NOTE   Patient: Yadira Arambula (79 y.o. female)   Treatment Date: 2023   :  1970 MRN: [id number]   Visit #: [facility] Needed   [place]  60 PCY []Yes    [x]No    Insurance: Payor: Trevor Vikki / Plan: [place] PPO / Product Type: *No Product type* /   Insurance ID: [id number] - ([place] BCBS)  Secondary Insurance (if applicable):    Treatment Diagnosis:     ICD-10-CM    1. Chronic left shoulder pain  M25.512     G89.29       2. Shoulder stiffness, left  M25.612          Medical Diagnosis:    Calcific tendinitis of left shoulder [M75.32]   Referring Physician: Samira Morales MD  PCP: Camryn Walters MD                             Plan of care signed (Y/N): Y    Date of Patient follow up with Physician:      Progress Report/POC: NO  POC update due: (10 visits /OR Theoplis Heft, whichever is less)  2024     LATEX ALLERGY    Preferred Language for Healthcare:   [x]English       []other:    SUBJECTIVE EXAMINATION     Patient Report/Comments:  Pt notes that she is having a really good day. She notes decreased pain and more motion.       Test used Initial score  2023   Pain Summary VAS 0-3/10 2-3/10   Functional questionnaire SPADI 50% deficit 48% deficit             OBJECTIVE EXAMINATION     Test measurements: 23    ROM/Strength: (Blank cells denote NT)       PROM L PROM R Notes AROM L AROM R Notes                        CERVICAL Cerv flexion            Cerv extension            Cerv SB                Cerv rotation                                       SHOULDER Flexion 148 - 12/14 180   115 165      Abduction               ER -0 60      C7 C7      ER -90  100            IR -0      Sacrum L1      IR -90  70                 MMT L MMT R Notes                     SHOULDER Flexion

## 2023-12-19 ENCOUNTER — APPOINTMENT (OUTPATIENT)
Dept: PHYSICAL THERAPY | Age: 53
End: 2023-12-19
Payer: COMMERCIAL

## 2023-12-26 ENCOUNTER — APPOINTMENT (OUTPATIENT)
Dept: PHYSICAL THERAPY | Age: 53
End: 2023-12-26
Payer: COMMERCIAL

## 2023-12-27 ENCOUNTER — OFFICE VISIT (OUTPATIENT)
Dept: ORTHOPEDIC SURGERY | Age: 53
End: 2023-12-27
Payer: COMMERCIAL

## 2023-12-27 VITALS — BODY MASS INDEX: 34.36 KG/M2 | HEIGHT: 70 IN | WEIGHT: 240 LBS

## 2023-12-27 DIAGNOSIS — M75.02 ADHESIVE CAPSULITIS OF LEFT SHOULDER: Primary | ICD-10-CM

## 2023-12-27 PROCEDURE — 99213 OFFICE O/P EST LOW 20 MIN: CPT | Performed by: ORTHOPAEDIC SURGERY

## 2023-12-27 NOTE — PROGRESS NOTES
conservatively. She is on the right track. Impression:  Encounter Diagnosis   Name Primary? Adhesive capsulitis of left shoulder Yes       Office Procedures:  Orders Placed This Encounter   Procedures    [address] [facility]     Referral Priority:   Routine     Referral Type:   Eval and Treat     Referral Reason:   Specialty Services Required     Requested Specialty:   Physical Therapist     Number of Visits Requested:   1       Plan:   Clemencia Litten presents today for follow-up with a diagnosis of left adhesive capsulitis being treated conservatively. 2 months ago she received an injection and has reported vast improvement since this. She states she is still not 100% same as she has a baseline pain and achieve full range of motion. She still has oral steroids at home but prefers not to take them. She would like to continue with physical therapy and see if she can get her range of motion and pain back with just this along. We agree with her and states that she is far better than when we first saw her. We will see her back in 4 weeks if she is still struggling and seeing a plateau in her progress we will readdress a second injection. Clemencia Litten will follow up in 4 weeks and/or as needed. She was in agreement with this plan and all questions were answered to the patient's satisfaction. She was encouraged to call with any questions. 12/27/2023  5:12 PM      Alicia Leyva MD  Orthopedic Surgery Sports Medicine Fellow    This dictation was performed with a verbal recognition program [facility]) and it was checked for errors. It is possible that there are still dictated errors within this office note. If so, please bring any errors to my attention for an addendum.   All efforts were made to ensure that this office note is accurate.   _____________  I was physically present and personally supervised the Orthopaedic Sports Medicine Fellow in the evaluation and

## 2023-12-28 ENCOUNTER — HOSPITAL ENCOUNTER (OUTPATIENT)
Dept: PHYSICAL THERAPY | Age: 53
Setting detail: THERAPIES SERIES
Discharge: HOME | End: 2023-12-28
Payer: COMMERCIAL

## 2023-12-28 PROCEDURE — 97140 MANUAL THERAPY 1/> REGIONS: CPT

## 2023-12-28 PROCEDURE — 97110 THERAPEUTIC EXERCISES: CPT

## 2023-12-28 NOTE — FLOWSHEET NOTE
Reaching activities, Bathing/Grooming, and Computer work without increased symptoms or restriction to work towards return to prior level of function. Status: [] Progressing: [] Met: [x] Not Met: [] Adjusted    Overall Progression Towards Functional goals/ Treatment Progress Update:  [] Patient is progressing as expected towards functional goals listed. [] Progression is slowed due to complexities/Impairments listed. [] Progression has been slowed due to co-morbidities. [x] Plan just implemented, too soon (<30days) to assess goals progression   [] Goals require adjustment due to lack of progress  [] Patient is not progressing as expected and requires additional follow up with physician  [] Other:     CHARGE CAPTURE     PT CHARGE GRID   CPT Code (TIMED) minutes # CPT Code (UNTIMED) #     [x] Therex (18461)  30' 2  [] EVAL:LOW (49142 - Typically 20 minutes face-to-face)     [] Neuromusc. Re-ed (37009)    [] Re-Eval (65167)     [x] Manual (63526) 10' 1  [] Estim Unattended (11530)     [] Ther. Act (42983)    [] Carolyne Gutierrez. Traction (90607)     [] Gait (97589)    [] Dry Needle 1-2 muscle (22206)     [] Aquatic Therex (45954)    [] Dry Needle 3+ muscle (18320)     [] Iontophoresis (47285)    [] VASO (34159)     [] Ultrasound (19889)    [] Group Therapy (12543)     [] Estim Attended (94188)    [] Other: Total Timed Code Tx Minutes 40' 3       Total Treatment Minutes 43'        Charge Justification:  (71487) THERAPEUTIC EXERCISE - Provided verbal/tactile cueing for activities related to strengthening, flexibility, endurance, ROM performed to prevent loss of range of motion, maintain or improve muscular strength or increase flexibility, following either an injury or surgery.    (63297) [facility]- Reviewed/Progressed HEP activities related to strengthening, flexibility, endurance, ROM performed to prevent loss of range of motion, maintain or improve muscular strength or increase

## 2024-01-02 ENCOUNTER — HOSPITAL ENCOUNTER (OUTPATIENT)
Dept: PHYSICAL THERAPY | Age: 54
Setting detail: THERAPIES SERIES
Discharge: HOME | End: 2024-01-02
Payer: COMMERCIAL

## 2024-01-02 PROCEDURE — 97110 THERAPEUTIC EXERCISES: CPT

## 2024-01-02 PROCEDURE — 97112 NEUROMUSCULAR REEDUCATION: CPT

## 2024-01-02 NOTE — FLOWSHEET NOTE
muscular strength or increase flexibility, following either an injury or surgery.  (77795) MANUAL THERAPY-  Manual therapy techniques, 1 or more regions, each 15 minutes (Mobilization/manipulation, manual lymphatic drainage, manual traction) for the purpose of modulating pain, promoting relaxation,  increasing ROM, reducing/eliminating soft tissue swelling/inflammation/restriction, improving soft tissue extensibility and allowing for proper ROM for normal function with self care, mobility, lifting and ambulation    TREATMENT PLAN   Plan: Cont POC- Continue emphasis/focus on exercise progression, improving proper muscle recruitment and activation/motor control patterns, modulating pain, promoting relaxation, and increasing ROM. Next visit plan to progress weights, progress reps, and add new exercises     Electronically Signed by Sung Riley, PT              Date: 01/02/2024     Note: If patient does not return for scheduled/recommended follow up visits, this note will serve as a discharge from care along with the most recent update on progress.

## 2024-01-04 ENCOUNTER — HOSPITAL ENCOUNTER (OUTPATIENT)
Dept: PHYSICAL THERAPY | Age: 54
Setting detail: THERAPIES SERIES
Discharge: HOME | End: 2024-01-04
Payer: COMMERCIAL

## 2024-01-04 PROCEDURE — 97112 NEUROMUSCULAR REEDUCATION: CPT

## 2024-01-04 PROCEDURE — 97110 THERAPEUTIC EXERCISES: CPT

## 2024-01-04 NOTE — FLOWSHEET NOTE
(Significantly impacts the rate of recovery and is associated with a primary condition.)     Treatment/Activity Tolerance:  [x] Patient tolerated treatment well [] Patient limited by fatique  [] Patient limited by pain  [] Patient limited by other medical complications  [x] Other: 1/4 Pt continues to respond well to progressions. Pt heavily educated on importance of rest breaks. Pt continues to show improvements with overall activity tolerance.     Return to Play: NA    Prognosis for POC: [x] Good [] Fair  [] Poor    Patient requires continued skilled intervention: [x] Yes  [] No      GOALS        Patient stated goal: Pt would like to return to swimming, working and being more active in general.   Status: [x] Progressing: [] Met: [x] Not Met: [] Adjusted     Therapist goals for Patient:   Short Term Goals: To be achieved in: 4 weeks  Independent in HEP and progression per patient tolerance, in order to progress toward full function and prevent re-injury.               Status: [x] Progressing: [x] Met: [] Not Met: [] Adjusted  Patient will have a decrease in pain to 2/10 to help  facilitate improvement in movement, function, and ADLs as indicated by functional deficits.              Status: [x] Progressing: [x] Met: [] Not Met: [] Adjusted     Long Term Goals: To be achieved in: 12 weeks  Disability index score of 20% or less for the SPADI to assist with return to prior level of function.                  Status: [] Progressing: [] Met: [x] Not Met: [] Adjusted  Improve L shoulder AROM to WFL to allow for proper joint functioning as indicated by patients functional deficits.  Status: [] Progressing: [] Met: [x] Not Met: [] Adjusted  Pt to improve strength to WFL of L rotator cuff, shoulder elevators, biceps, and triceps to allow for proper muscle and joint use in functional mobility, ADLs and prior level of function   Status: [] Progressing: [] Met: [x] Not Met: [] Adjusted  Patient will return to Reaching

## 2024-01-09 ENCOUNTER — HOSPITAL ENCOUNTER (OUTPATIENT)
Dept: PHYSICAL THERAPY | Age: 54
Setting detail: THERAPIES SERIES
Discharge: HOME | End: 2024-01-09
Payer: COMMERCIAL

## 2024-01-09 PROCEDURE — 97140 MANUAL THERAPY 1/> REGIONS: CPT

## 2024-01-09 PROCEDURE — 97112 NEUROMUSCULAR REEDUCATION: CPT

## 2024-01-09 PROCEDURE — 97110 THERAPEUTIC EXERCISES: CPT

## 2024-01-09 NOTE — FLOWSHEET NOTE
[facility]- [facility]patient [facility] Therapy [address], [place] [address] office: [phone] fax: [phone]    Physical Therapy: TREATMENT/PROGRESS NOTE   Patient: Ila Mata (53 y.o. female)   Treatment Date: 2024   :  1970 MRN: [id number]   Visit #: 16   Insurance Allowable Auth Needed   [place]  60 PCY []Yes    [x]No    Insurance: Payor: BCBS / Plan: BCBS - [place] PPO / Product Type: *No Product type* /   Insurance ID: [id number] - ([place] BCBS)  Secondary Insurance (if applicable):    Treatment Diagnosis:     ICD-10-CM    1. Chronic left shoulder pain  M25.512     G89.29       2. Shoulder stiffness, left  M25.612          Medical Diagnosis:    Calcific tendinitis of left shoulder [M75.32]   Referring Physician: Eduar Becker MD  PCP: Lesa Toribio MD                             Plan of care signed (Y/N): Y    Date of Patient follow up with Physician:      Progress Report/POC: NO  POC update due: (10 visits /OR AUTH LIMITS, whichever is less)  2024     LATEX ALLERGY    Preferred Language for Healthcare:   [x]English       []other:    SUBJECTIVE EXAMINATION     Patient Report/Comments:  Pt notes that her shoulder is a little sore today. She notes she's returned to working at full capacity.      Test used Initial score  2023     Pain Summary VAS 0-3/10 210 0-2/10   Functional questionnaire SPADI 50% deficit 48% deficit               OBJECTIVE EXAMINATION     Test measurements: 23    ROM/Strength: (Blank cells denote NT)       PROM L PROM R Notes AROM L AROM R Notes                        CERVICAL Cerv flexion            Cerv extension            Cerv SB                Cerv rotation                                       SHOULDER Flexion 148 - 12/14 180   115 165      Abduction               ER -0 60      C7 C7      ER -90  100            IR -0      Sacrum L1      IR -90  70                 MMT L MMT R Notes

## 2024-01-11 ENCOUNTER — HOSPITAL ENCOUNTER (OUTPATIENT)
Dept: PHYSICAL THERAPY | Age: 54
Setting detail: THERAPIES SERIES
Discharge: HOME | End: 2024-01-11
Payer: COMMERCIAL

## 2024-01-11 PROCEDURE — 97110 THERAPEUTIC EXERCISES: CPT

## 2024-01-11 NOTE — PLAN OF CARE
Adjusted    Overall Progression Towards Functional goals/ Treatment Progress Update:  [] Patient is progressing as expected towards functional goals listed.    [] Progression is slowed due to complexities/Impairments listed.  [x] Progression has been slowed due to co-morbidities.  [] Plan just implemented, too soon (<30days) to assess goals progression   [] Goals require adjustment due to lack of progress  [] Patient is not progressing as expected and requires additional follow up with physician  [] Other:     CHARGE CAPTURE     PT CHARGE GRID   CPT Code (TIMED) minutes # CPT Code (UNTIMED) #     [x] Therex (58045)  30' 2  [] EVAL:LOW (61251 - Typically 20 minutes face-to-face)     [] Neuromusc. Re-ed (43682)    [] Re-Eval (83806)     [] Manual (36610)    [] Estim Unattended (81828)     [] Ther. Act (24999)    [] Mech. Traction (10483)     [] Gait (54608)    [] Dry Needle 1-2 muscle (19422)     [] Aquatic Therex (04738)    [] Dry Needle 3+ muscle (20561)     [] Iontophoresis (59592)    [] VASO (06683)     [] Ultrasound (86620)    [] Group Therapy (81054)     [] Estim Attended (52837)    [] Other:    Total Timed Code Tx Minutes 30' 2       Total Treatment Minutes 40'        Charge Justification:  (14346) THERAPEUTIC EXERCISE - Provided verbal/tactile cueing for activities related to strengthening, flexibility, endurance, ROM performed to prevent loss of range of motion, maintain or improve muscular strength or increase flexibility, following either an injury or surgery.   (22254) HOME EXERCISE PROGRAM- Reviewed/Progressed HEP activities related to strengthening, flexibility, endurance, ROM performed to prevent loss of range of motion, maintain or improve muscular strength or increase flexibility, following either an injury or surgery.  (33136) MANUAL THERAPY-  Manual therapy techniques, 1 or more regions, each 15 minutes (Mobilization/manipulation, manual lymphatic drainage, manual traction) for the purpose of

## 2024-01-16 ENCOUNTER — HOSPITAL ENCOUNTER (OUTPATIENT)
Dept: PHYSICAL THERAPY | Age: 54
Setting detail: THERAPIES SERIES
Discharge: HOME | End: 2024-01-16
Payer: COMMERCIAL

## 2024-01-16 PROCEDURE — 97110 THERAPEUTIC EXERCISES: CPT

## 2024-01-16 PROCEDURE — 97112 NEUROMUSCULAR REEDUCATION: CPT

## 2024-01-16 NOTE — PLAN OF CARE
minutes (Mobilization/manipulation, manual lymphatic drainage, manual traction) for the purpose of modulating pain, promoting relaxation,  increasing ROM, reducing/eliminating soft tissue swelling/inflammation/restriction, improving soft tissue extensibility and allowing for proper ROM for normal function with self care, mobility, lifting and ambulation    TREATMENT PLAN   Plan: Cont POC- Continue emphasis/focus on exercise progression, improving proper muscle recruitment and activation/motor control patterns, and modulating pain. Next visit plan to progress weights, progress reps, and add new exercises     Electronically Signed by Sung Riley, ANGELLA              Date: 01/16/2024     Note: If patient does not return for scheduled/recommended follow up visits, this note will serve as a discharge from care along with the most recent update on progress.

## 2024-01-18 ENCOUNTER — APPOINTMENT (OUTPATIENT)
Dept: PHYSICAL THERAPY | Age: 54
End: 2024-01-18
Payer: COMMERCIAL

## 2024-01-19 RX ORDER — OMEPRAZOLE 40 MG/1
40 CAPSULE, DELAYED RELEASE ORAL DAILY
Qty: 90 CAPSULE | Refills: 0 | Status: SHIPPED | OUTPATIENT
Start: 2024-01-19

## 2024-01-19 NOTE — TELEPHONE ENCOUNTER
INCOMING FAX FROM [facility]    Requested Prescriptions     Pending Prescriptions Disp Refills    omeprazole (PRILOSEC) 40 MG delayed release capsule 90 capsule 0     Sig: Take 1 capsule by mouth daily         Last OV: 6/20/2023    Last labs: 10/27/2023     F/u: no

## 2024-01-23 ENCOUNTER — HOSPITAL ENCOUNTER (OUTPATIENT)
Dept: PHYSICAL THERAPY | Age: 54
Setting detail: THERAPIES SERIES
Discharge: HOME | End: 2024-01-23
Payer: COMMERCIAL

## 2024-01-23 PROCEDURE — 97140 MANUAL THERAPY 1/> REGIONS: CPT

## 2024-01-23 PROCEDURE — G0283 ELEC STIM OTHER THAN WOUND: HCPCS

## 2024-01-23 NOTE — FLOWSHEET NOTE
[facility]- Outpatient Rehabilitation and Therapy [address], [place] [address] office: [phone] fax: [phone]      Physical Therapy: TREATMENT/PROGRESS NOTE   Patient: Ila Mata (53 y.o. female)   Treatment Date: 2024   :  1970 MRN: [id number]   Visit #: 19   Insurance Allowable Auth Needed   [place]  60 PCY []Yes    [x]No    Insurance: Payor: BCBS / Plan: BCBS - [place] PPO / Product Type: *No Product type* /   Insurance ID: [id number] - ([place] BCBS)  Secondary Insurance (if applicable):    Treatment Diagnosis:     ICD-10-CM    1. Chronic left shoulder pain  M25.512     G89.29       2. Shoulder stiffness, left  M25.612          Medical Diagnosis:    Calcific tendinitis of left shoulder [M75.32]   Referring Physician: Eduar Becker MD  PCP: Lesa Toribio MD                             Plan of care signed (Y/N): Y    Date of Patient follow up with Physician:      Progress Report/POC: NO  POC update due: (10 visits /OR AUTH LIMITS, whichever is less)  24    LATEX ALLERGY    Preferred Language for Healthcare:   [x]English       []other:    SUBJECTIVE EXAMINATION     Patient Report/Comments:  Pt notes that she is a little more sore today. She notes she's been busier than normal at work.      Test used Initial score  2023     Pain Summary VAS 0-3/10 10 2/10 0/10   Functional questionnaire SPADI 50% deficit 48% deficit 20% deficit                OBJECTIVE EXAMINATION     Test measurements: 24    ROM/Strength: (Blank cells denote NT)       PROM L PROM R Notes AROM L AROM R Notes                           SHOULDER Flexion 150 180   145 165      Abduction               ER -0 60      C7 C7      ER -90  100            IR -0      L1 L1      IR -90  70                 MMT L MMT R Notes                     SHOULDER Flexion 4+ 5      Abduction          ER -0 4+ 4+      ER -90          IR -0 4+ 4+      IR -90

## 2024-01-23 NOTE — PROGRESS NOTES
Continuous Blood Gluc Sensor (FREESTYLE WALESKA 3 SENSOR) MISC  - per pt request.  Try Tirzepatide instead of Wegovy      2. Obstructive sleep apnea syndrome  Compliant with CPAP      3. Need for shingles vaccine  Addressed; declines for now      4. Pure hypercholesterolemia  Continue statin, diet and exercise. LDL goal < 130      5. Essential hypertension  At goal < 130/80  Continue current rx.       6. Well adult exam  Comprehensive Metabolic Panel    TSH with Reflex    Lipid Panel    CBC    Hemoglobin A1C             Plan:      Side effects of current medications reviewed and questions answered.    Follow up in 3 months or prn.

## 2024-01-24 ENCOUNTER — OFFICE VISIT (OUTPATIENT)
Dept: ORTHOPEDIC SURGERY | Age: 54
End: 2024-01-24
Payer: COMMERCIAL

## 2024-01-24 ENCOUNTER — OFFICE VISIT (OUTPATIENT)
Age: 54
End: 2024-01-24
Payer: COMMERCIAL

## 2024-01-24 VITALS
WEIGHT: 236.8 LBS | SYSTOLIC BLOOD PRESSURE: 122 MMHG | HEART RATE: 81 BPM | DIASTOLIC BLOOD PRESSURE: 78 MMHG | TEMPERATURE: 97.3 F | RESPIRATION RATE: 16 BRPM | OXYGEN SATURATION: 94 % | BODY MASS INDEX: 33.98 KG/M2

## 2024-01-24 VITALS — WEIGHT: 240 LBS | HEIGHT: 70 IN | BODY MASS INDEX: 34.36 KG/M2

## 2024-01-24 DIAGNOSIS — Z23 NEED FOR SHINGLES VACCINE: ICD-10-CM

## 2024-01-24 DIAGNOSIS — M75.22 BICEPS TENDINITIS OF LEFT UPPER EXTREMITY: ICD-10-CM

## 2024-01-24 DIAGNOSIS — E78.00 PURE HYPERCHOLESTEROLEMIA: Chronic | ICD-10-CM

## 2024-01-24 DIAGNOSIS — M25.512 CHRONIC LEFT SHOULDER PAIN: ICD-10-CM

## 2024-01-24 DIAGNOSIS — G47.33 OBSTRUCTIVE SLEEP APNEA SYNDROME: Chronic | ICD-10-CM

## 2024-01-24 DIAGNOSIS — M75.02 ADHESIVE CAPSULITIS OF LEFT SHOULDER: Primary | ICD-10-CM

## 2024-01-24 DIAGNOSIS — I10 ESSENTIAL HYPERTENSION: Chronic | ICD-10-CM

## 2024-01-24 DIAGNOSIS — Z00.00 WELL ADULT EXAM: ICD-10-CM

## 2024-01-24 DIAGNOSIS — E66.01 CLASS 2 SEVERE OBESITY DUE TO EXCESS CALORIES WITH SERIOUS COMORBIDITY AND BODY MASS INDEX (BMI) OF 35.0 TO 35.9 IN ADULT (HCC): Primary | Chronic | ICD-10-CM

## 2024-01-24 DIAGNOSIS — G89.29 CHRONIC LEFT SHOULDER PAIN: ICD-10-CM

## 2024-01-24 PROCEDURE — 3074F SYST BP LT 130 MM HG: CPT | Performed by: FAMILY MEDICINE

## 2024-01-24 PROCEDURE — 3078F DIAST BP <80 MM HG: CPT | Performed by: FAMILY MEDICINE

## 2024-01-24 PROCEDURE — 99214 OFFICE O/P EST MOD 30 MIN: CPT | Performed by: FAMILY MEDICINE

## 2024-01-24 PROCEDURE — 99214 OFFICE O/P EST MOD 30 MIN: CPT | Performed by: ORTHOPAEDIC SURGERY

## 2024-01-24 RX ORDER — BLOOD-GLUCOSE SENSOR
1 EACH MISCELLANEOUS
Qty: 2 EACH | Refills: 5 | Status: SHIPPED | OUTPATIENT
Start: 2024-01-24

## 2024-01-24 RX ORDER — MAGNESIUM GLUCONATE 27 MG(500)
500 TABLET ORAL 2 TIMES DAILY
COMMUNITY

## 2024-01-24 ASSESSMENT — PATIENT HEALTH QUESTIONNAIRE - PHQ9
SUM OF ALL RESPONSES TO PHQ9 QUESTIONS 1 & 2: 1
SUM OF ALL RESPONSES TO PHQ QUESTIONS 1-9: 1
1. LITTLE INTEREST OR PLEASURE IN DOING THINGS: 0
SUM OF ALL RESPONSES TO PHQ QUESTIONS 1-9: 1
SUM OF ALL RESPONSES TO PHQ QUESTIONS 1-9: 1
2. FEELING DOWN, DEPRESSED OR HOPELESS: 1
SUM OF ALL RESPONSES TO PHQ QUESTIONS 1-9: 1

## 2024-01-24 NOTE — PROGRESS NOTES
[facility]  History and Physical  Shoulder Pain    Date:  2024    Name:  Ila Mata  Address:  [address] [place] [address]    :  1970      Age:   53 y.o.    SSN:  [id number]      Medical Record Number:  [id number]    Reason for Visit:    Shoulder Pain (F/U LEFT SHOULDER)      HPI:   Ila Mata is a 53 y.o. female who presents to our office today for follow up of the left shoulder pain.  She has been undergoing formal physical therapy for quite some time.  She had been struggling fully with adhesive capsulitis and working on regaining her full function.  She does feel that she has made quite a bit of progress since her last visit.  She continues to have pain over the anterior aspect of the left shoulder.  She denies any new injury.      Pain Assessment  Location of Pain: Shoulder  Location Modifiers: Left  Severity of Pain: 2  Quality of Pain: Sharp  Frequency of Pain: Intermittent  Aggravating Factors: Other (Comment), Straightening, Stretching  Limiting Behavior: Some  Relieving Factors: Rest, Ice, Heat, Nsaids, Other (Comment)  Work-Related Injury: No  Are there other pain locations you wish to document?: No        Review of Systems:  A 14 point review of systems available in the scanned medical record as documented by the patient and reviewed on 2024.  The review is negative with the exception of those things mentioned in the History of Present Illness and Past Medical History.      Past Medical History:  Patient's medications, allergies, past medical, surgical, social and family histories were reviewed and updated as appropriate.    Allergies:  Allergies   Allergen Reactions    Latex     Bactrim [Sulfamethoxazole-Trimethoprim] Shortness Of Breath and Rash       Physical Exam:  There were no vitals filed for this visit.  General: Ila Mata is a healthy and well appearing 53 y.o. female who is sitting comfortably in our

## 2024-01-25 ENCOUNTER — APPOINTMENT (OUTPATIENT)
Dept: PHYSICAL THERAPY | Age: 54
End: 2024-01-25
Payer: COMMERCIAL

## 2024-01-29 ENCOUNTER — OFFICE VISIT (OUTPATIENT)
Dept: ORTHOPEDIC SURGERY | Age: 54
End: 2024-01-29
Payer: COMMERCIAL

## 2024-01-29 VITALS — WEIGHT: 236 LBS | HEIGHT: 70 IN | BODY MASS INDEX: 33.79 KG/M2

## 2024-01-29 DIAGNOSIS — M75.22 BICEPS TENDINITIS OF LEFT UPPER EXTREMITY: ICD-10-CM

## 2024-01-29 DIAGNOSIS — M75.02 ADHESIVE CAPSULITIS OF LEFT SHOULDER: Primary | ICD-10-CM

## 2024-01-29 PROCEDURE — 99202 OFFICE O/P NEW SF 15 MIN: CPT | Performed by: INTERNAL MEDICINE

## 2024-01-29 PROCEDURE — 20550 NJX 1 TENDON SHEATH/LIGAMENT: CPT | Performed by: INTERNAL MEDICINE

## 2024-01-29 RX ORDER — BETAMETHASONE SODIUM PHOSPHATE AND BETAMETHASONE ACETATE 3; 3 MG/ML; MG/ML
12 INJECTION, SUSPENSION INTRA-ARTICULAR; INTRALESIONAL; INTRAMUSCULAR; SOFT TISSUE ONCE
Status: COMPLETED | OUTPATIENT
Start: 2024-01-29 | End: 2024-01-29

## 2024-01-29 RX ORDER — BUPIVACAINE HYDROCHLORIDE 2.5 MG/ML
1 INJECTION, SOLUTION INFILTRATION; PERINEURAL ONCE
Status: COMPLETED | OUTPATIENT
Start: 2024-01-29 | End: 2024-01-29

## 2024-01-29 RX ORDER — LIDOCAINE HYDROCHLORIDE 10 MG/ML
3 INJECTION, SOLUTION INFILTRATION; PERINEURAL ONCE
Status: COMPLETED | OUTPATIENT
Start: 2024-01-29 | End: 2024-01-29

## 2024-01-29 RX ADMIN — BETAMETHASONE SODIUM PHOSPHATE AND BETAMETHASONE ACETATE 12 MG: 3; 3 INJECTION, SUSPENSION INTRA-ARTICULAR; INTRALESIONAL; INTRAMUSCULAR; SOFT TISSUE at 10:58

## 2024-01-29 RX ADMIN — LIDOCAINE HYDROCHLORIDE 3 ML: 10 INJECTION, SOLUTION INFILTRATION; PERINEURAL at 10:59

## 2024-01-29 RX ADMIN — BUPIVACAINE HYDROCHLORIDE 2.5 MG: 2.5 INJECTION, SOLUTION INFILTRATION; PERINEURAL at 10:59

## 2024-01-29 NOTE — PROGRESS NOTES
linear transducer was utilized and placed in short axis over the proximal biceps tendon and the biceps tendon was noted to have a normal sonographic appearance and the bicipital groove and position in the medial aspect of the groove.    Topical anesthetic was utilized after sterile preparation    25-gauge needle was advanced using longitudinal technique subcutaneously and intramuscularly.  Approximately 2 cc 1% lidocaine was injected.  The needle tip was then advanced within the lateral aspect bicipital groove and deep to the transverse ligament.  The tip was advanced just superficial to the superolateral aspect of the biceps tendon and the tendon sheath was visualized to hydrodissect.  A mixture containing 1 cc Celestone and 1 cc 0.25% Marcaine was injected successfully.    Patient tolerated this with minimal discomfort    No appreciable immediate anesthetic response      Other Outside Imaging and Testing Personally Reviewed:    MRI left shoulder    IMPRESSION:     1. Abnormal increased muscular signal most pronounced in the infraspinatus  muscle with faint adjacent signal abnormality in the terries minor muscle and in  the supraspinatus muscle. Additional faint edema in the deltoid muscle. Findings  could reflect Parsonage-Parr syndrome in the proper clinical setting. Clinical  correlation recommended.  2. Focal low signal abnormality adjacent to the bicipital groove and insertion  of the subscapularis which could reflect a small hematoma from recent  intervention or may represent calcific tendinitis in the proper clinical  setting..           Specimen Collected: 10/29/23 08:19 EDT Last Resulted: 10/29/23 08:42 EDT               Assessment   Impression: .    Encounter Diagnoses   Name Primary?    Biceps tendinitis of left upper extremity     Adhesive capsulitis of left shoulder Yes              Plan:     Postinjection protocol clinical follow-up with Dr. AMI Sarmiento as scheduled      The nature of the finding,

## 2024-01-30 ENCOUNTER — HOSPITAL ENCOUNTER (OUTPATIENT)
Dept: PHYSICAL THERAPY | Age: 54
Setting detail: THERAPIES SERIES
Discharge: HOME | End: 2024-01-30
Payer: COMMERCIAL

## 2024-01-30 PROCEDURE — 97112 NEUROMUSCULAR REEDUCATION: CPT

## 2024-01-30 PROCEDURE — 97110 THERAPEUTIC EXERCISES: CPT

## 2024-01-30 NOTE — FLOWSHEET NOTE
outstanding complexities including chronicity of symptoms.     Medical Necessity Documentation:  I certify that this patient meets the below criteria necessary for medical necessity for care and/or justification of therapy services:  The patient has a musculoskeletal condition(s) with a corresponding ICD-10 code that is of complexity and severity that require skilled therapeutic intervention. This has a direct and significant impact on the need for therapy and significantly impacts the rate of recovery.   The patient has a complexity identified by an ICD-10 code that has a direct and significant impact on the need for therapy.  (Significantly impacts the rate of recovery and is associated with a primary condition.)     Treatment/Activity Tolerance:  [x] Patient tolerated treatment well [] Patient limited by fatique  [] Patient limited by pain  [] Patient limited by other medical complications  [x] Other: 1/30 Pt demonstrates improved tolerance to PRE in today's session.     Return to Play: NA    Prognosis for POC: [x] Good [] Fair  [] Poor    Patient requires continued skilled intervention: [x] Yes  [] No      GOALS        Patient stated goal: Pt would like to return to swimming, working and being more active in general.   Status: [x] Progressing: [] Met: [x] Not Met: [] Adjusted     Therapist goals for Patient:   Short Term Goals: To be achieved in: 4 weeks  Independent in HEP and progression per patient tolerance, in order to progress toward full function and prevent re-injury.               Status: [] Progressing: [x] Met: [] Not Met: [] Adjusted  Patient will have a decrease in pain to 2/10 to help  facilitate improvement in movement, function, and ADLs as indicated by functional deficits.              Status: [] Progressing: [x] Met: [] Not Met: [] Adjusted     Long Term Goals: To be achieved in: 12 weeks  Disability index score of 20% or less for the SPADI to assist with return to prior level of function.

## 2024-01-31 DIAGNOSIS — I10 ESSENTIAL HYPERTENSION: ICD-10-CM

## 2024-01-31 DIAGNOSIS — M75.81 TENDINITIS OF RIGHT ROTATOR CUFF: ICD-10-CM

## 2024-02-01 RX ORDER — VALSARTAN AND HYDROCHLOROTHIAZIDE 160; 25 MG/1; MG/1
1 TABLET ORAL DAILY
Qty: 90 TABLET | Refills: 1 | Status: SHIPPED | OUTPATIENT
Start: 2024-02-01

## 2024-02-01 RX ORDER — OMEPRAZOLE 40 MG/1
40 CAPSULE, DELAYED RELEASE ORAL DAILY
Qty: 90 CAPSULE | Refills: 0 | OUTPATIENT
Start: 2024-02-01

## 2024-02-01 NOTE — TELEPHONE ENCOUNTER
Requested Prescriptions     Pending Prescriptions Disp Refills    valsartan-hydroCHLOROthiazide (DIOVAN-HCT) 160-25 MG per tablet 90 tablet 1     Sig: Take 1 tablet by mouth daily         Last OV: 1/24/2024    Last labs: 10/29/2023     F/u: 1/31/2024

## 2024-02-08 ENCOUNTER — HOSPITAL ENCOUNTER (OUTPATIENT)
Dept: PHYSICAL THERAPY | Age: 54
Setting detail: THERAPIES SERIES
Discharge: HOME | End: 2024-02-08
Payer: COMMERCIAL

## 2024-02-08 PROCEDURE — 97110 THERAPEUTIC EXERCISES: CPT

## 2024-02-08 PROCEDURE — 97112 NEUROMUSCULAR REEDUCATION: CPT

## 2024-02-08 NOTE — FLOWSHEET NOTE
[facility] [address], [place] [address] office: [phone] fax: [phone]      Physical Therapy: TREATMENT/PROGRESS NOTE   Patient: Ila Mata (53 y.o. female)   Treatment Date: 2024   :  1970 MRN: [id number]   Visit #: 21   Insurance Allowable Auth Needed   [place]  60 PCY []Yes    [x]No    Insurance: Payor: BCBS / Plan: BCBS - [place] PPO / Product Type: *No Product type* /   Insurance ID: [id number] - ([place] [employer])  Secondary Insurance (if applicable):    Treatment Diagnosis:     ICD-10-CM    1. Chronic left shoulder pain  M25.512     G89.29       2. Shoulder stiffness, left  M25.612          Medical Diagnosis:    Calcific tendinitis of left shoulder [M75.32]   Referring Physician: Eduar Becker MD  PCP: Lesa Toribio MD                             Plan of care signed (Y/N): Y    Date of Patient follow up with Physician:      Progress Report/POC: NO  POC update due: (10 visits /OR AUTH LIMITS, whichever is less)  24    [facility]AT[facility] ALLERGY    Preferred Language for Healthcare:   [x]English       []other:    SUBJECTIVE EXAMINATION     Patient Report/Comments:  Pt notes that she feels great. She notes that at most, her pain has been a 1/10. She notes that she's been working out with no issues.      Test used Initial score  2023     Pain Summary VAS 0-310 2/10 2/10 0/10   Functional questionnaire SPADI 50% deficit 48% deficit 20% deficit                OBJECTIVE EXAMINATION     Test measurements: 24    ROM/Strength: (Blank cells denote NT)       PROM L PROM R Notes AROM L AROM R Notes                           SHOULDER Flexion 150 180   145 165      Abduction               ER -0 60      C7 C7      ER -90  100            IR -0      L1 L1      IR -90  70                 MMT L MMT R Notes                     SHOULDER Flexion 4+ 5      Abduction          ER -0 4+ 4+      ER -90

## 2024-02-15 ENCOUNTER — APPOINTMENT (OUTPATIENT)
Dept: PHYSICAL THERAPY | Age: 54
End: 2024-02-15
Payer: COMMERCIAL

## 2024-02-22 ENCOUNTER — HOSPITAL ENCOUNTER (OUTPATIENT)
Dept: PHYSICAL THERAPY | Age: 54
Setting detail: THERAPIES SERIES
Discharge: HOME | End: 2024-02-22
Payer: COMMERCIAL

## 2024-02-22 PROCEDURE — 97530 THERAPEUTIC ACTIVITIES: CPT

## 2024-02-22 NOTE — DISCHARGE SUMMARY
Term Goals: To be achieved in: 4 weeks  Independent in HEP and progression per patient tolerance, in order to progress toward full function and prevent re-injury.               Status: [] Progressing: [x] Met: [] Not Met: [] Adjusted  Patient will have a decrease in pain to 2/10 to help  facilitate improvement in movement, function, and ADLs as indicated by functional deficits.              Status: [] Progressing: [x] Met: [] Not Met: [] Adjusted     Long Term Goals: To be achieved in: 12 weeks  Disability index score of 20% or less for the SPADI to assist with return to prior level of function.                  Status: [] Progressing: [x] Met: [] Not Met: [] Adjusted  Improve L shoulder AROM to WFL to allow for proper joint functioning as indicated by patients functional deficits.  Status: [] Progressing: [x] Met: [] Not Met: [] Adjusted  Pt to improve strength to WFL of L rotator cuff, shoulder elevators, biceps, and triceps to allow for proper muscle and joint use in functional mobility, ADLs and prior level of function   Status: [] Progressing: [x] Met: [] Not Met: [] Adjusted  Patient will return to Reaching activities, Bathing/Grooming, and Computer work without increased symptoms or restriction to work towards return to prior level of function.                         Status: [] Progressing: [x] Met: [] Not Met: [] Adjusted    Overall Progression Towards Functional goals/ Treatment Progress Update:  [] Patient is progressing as expected towards functional goals listed.    [] Progression is slowed due to complexities/Impairments listed.  [] Progression has been slowed due to co-morbidities.  [] Plan just implemented, too soon (<30days) to assess goals progression   [] Goals require adjustment due to lack of progress  [] Patient is not progressing as expected and requires additional follow up with physician  [] Other:     CHARGE CAPTURE     PT CHARGE GRID   CPT Code (TIMED) minutes # CPT Code (UNTIMED) #     []

## 2024-02-23 RX ORDER — MELOXICAM 15 MG/1
15 TABLET ORAL DAILY
Qty: 90 TABLET | Refills: 1 | OUTPATIENT
Start: 2024-02-23

## 2024-02-23 RX ORDER — MELOXICAM 7.5 MG/1
7.5 TABLET ORAL DAILY
Qty: 30 TABLET | Refills: 3 | Status: SHIPPED | OUTPATIENT
Start: 2024-02-23

## 2024-02-28 ENCOUNTER — OFFICE VISIT (OUTPATIENT)
Dept: ORTHOPEDIC SURGERY | Age: 54
End: 2024-02-28
Payer: COMMERCIAL

## 2024-02-28 VITALS — HEIGHT: 70 IN | WEIGHT: 236 LBS | BODY MASS INDEX: 33.79 KG/M2

## 2024-02-28 DIAGNOSIS — M75.22 BICEPS TENDINITIS OF LEFT UPPER EXTREMITY: Primary | ICD-10-CM

## 2024-02-28 PROCEDURE — 99213 OFFICE O/P EST LOW 20 MIN: CPT | Performed by: ORTHOPAEDIC SURGERY

## 2024-02-28 NOTE — PROGRESS NOTES
described by Sushma Nagar, PA-C in my presence, and it is both accurate and complete.    Rafael Sarmiento MD, PhD  2/28/2024

## 2024-03-19 ENCOUNTER — PATIENT MESSAGE (OUTPATIENT)
Age: 54
End: 2024-03-19

## 2024-03-19 DIAGNOSIS — M75.81 TENDINITIS OF RIGHT ROTATOR CUFF: ICD-10-CM

## 2024-03-19 RX ORDER — MELOXICAM 15 MG/1
15 TABLET ORAL DAILY
Qty: 90 TABLET | Refills: 1 | Status: SHIPPED | OUTPATIENT
Start: 2024-03-19

## 2024-03-19 NOTE — TELEPHONE ENCOUNTER
Requested Prescriptions     Pending Prescriptions Disp Refills    meloxicam (MOBIC) 15 MG tablet 90 tablet 1     Sig: Take 1 tablet by mouth daily         Last OV: 1/24/2024    Last labs: 10/27/2023     F/u: Visit date not found

## 2024-03-19 NOTE — TELEPHONE ENCOUNTER
From: Ila Mata  To: Dr. Lesa Toribio  Sent: 3/19/2024 1:26 PM EDT  Subject: Med refill    Good afternoon,  I’m trying to get a refill for meloxicam 15 mg for my arthristis but somehow didn’t see it in my meds list .  Thank you   Ila Mata

## 2024-03-20 DIAGNOSIS — M75.81 TENDINITIS OF RIGHT ROTATOR CUFF: ICD-10-CM

## 2024-03-20 RX ORDER — MELOXICAM 15 MG/1
15 TABLET ORAL DAILY
Qty: 90 TABLET | Refills: 1 | Status: CANCELLED | OUTPATIENT
Start: 2024-03-20

## 2024-03-20 NOTE — TELEPHONE ENCOUNTER
E-Prescribing Status: Receipt confirmed by pharmacy (3/19/2024  2:30 PM EDT)   [facility] [id number] - [place] - [address]  - P [phone] - F [phone]  [address]  , [place] [address]  Phone: [phone]  Fax: [phone]

## 2024-04-05 ENCOUNTER — PATIENT MESSAGE (OUTPATIENT)
Age: 54
End: 2024-04-05

## 2024-04-05 DIAGNOSIS — E66.01 CLASS 2 SEVERE OBESITY DUE TO EXCESS CALORIES WITH SERIOUS COMORBIDITY AND BODY MASS INDEX (BMI) OF 35.0 TO 35.9 IN ADULT (HCC): Chronic | ICD-10-CM

## 2024-04-05 NOTE — TELEPHONE ENCOUNTER
From: Ila Mata  To: Dr. Lesa Toribio  Sent: 4/5/2024 2:17 PM EDT  Subject: Zepbound     Good afternoon, like discussed previously it’s becoming very difficult to find Zepbound 15 mg in the pharmacy.i did some research and found something interesting.  I was thinking giving this a shot.  It’s called [employer] direct and they order directly from [employer].  We just need to send a scrip to [facility]. Here join is a screen shot of the how to .    I think it’s worth the shot. Maybe I can even get 3 months at a time. My insurance doesn’t cover it so I will use a coupon.    Thanks   Ila

## 2024-04-23 ENCOUNTER — TELEPHONE (OUTPATIENT)
Age: 54
End: 2024-04-23

## 2024-04-23 NOTE — TELEPHONE ENCOUNTER
Patient called stating the pharmacy needs PA for Tirzepatide-Weight Management 15 MG/0.5ML SOAJ    PA started via CMM: Key: [id number]   Outcome:  This request cannot be processed due to the medication is not covered by the plan.     [facility] on [address] [place] [address] is where patient had rx transferred because they had in stock.     Has been using co-pay cards previously. Spoke with the pharmacy [facility] and she is unable to continue to use co-pay cards because in order to use them her insurance must cover some part of the cost.

## 2024-04-26 RX ORDER — OMEPRAZOLE 40 MG/1
40 CAPSULE, DELAYED RELEASE ORAL DAILY
Qty: 90 CAPSULE | Refills: 0 | Status: SHIPPED | OUTPATIENT
Start: 2024-04-26

## 2024-04-26 NOTE — TELEPHONE ENCOUNTER
Requested Prescriptions     Pending Prescriptions Disp Refills    omeprazole (PRILOSEC) 40 MG delayed release capsule 90 capsule 0     Sig: Take 1 capsule by mouth daily         Last OV: 1/24/2024    Last labs: 10/27/2023     F/u: Visit date not found

## 2024-05-23 DIAGNOSIS — E66.01 CLASS 2 SEVERE OBESITY DUE TO EXCESS CALORIES WITH SERIOUS COMORBIDITY AND BODY MASS INDEX (BMI) OF 35.0 TO 35.9 IN ADULT (HCC): Chronic | ICD-10-CM

## 2024-05-23 NOTE — TELEPHONE ENCOUNTER
Requested Prescriptions     Pending Prescriptions Disp Refills    Tirzepatide-Weight Management 15 MG/0.5ML SOAJ 6 mL 1     Sig: Inject 15 mg into the skin once a week         Last OV: 1/24/2024    Last labs: 10/27/2023     F/u: Visit date not found

## 2024-05-31 LAB
CHOLESTEROL, TOTAL: 168 MG/DL
CHOLESTEROL/HDL RATIO: 4.2
HDLC SERPL-MCNC: 40 MG/DL (ref 35–70)
LDL CHOLESTEROL CALCULATED: 45 MG/DL (ref 0–160)
NONHDLC SERPL-MCNC: NORMAL MG/DL
TRIGL SERPL-MCNC: 163 MG/DL
VLDLC SERPL CALC-MCNC: NORMAL MG/DL

## 2024-06-04 DIAGNOSIS — E78.00 PURE HYPERCHOLESTEROLEMIA: ICD-10-CM

## 2024-06-04 RX ORDER — ATORVASTATIN CALCIUM 20 MG/1
20 TABLET, FILM COATED ORAL DAILY
Qty: 90 TABLET | Refills: 1 | Status: SHIPPED | OUTPATIENT
Start: 2024-06-04

## 2024-06-04 NOTE — TELEPHONE ENCOUNTER
Requested Prescriptions     Pending Prescriptions Disp Refills    atorvastatin (LIPITOR) 20 MG tablet 90 tablet 1     Sig: Take 1 tablet by mouth daily         Last OV: 1/24/2024    Last labs: 10/29/2023     F/u: Visit date not found

## 2024-06-05 DIAGNOSIS — Z00.00 WELL ADULT EXAM: ICD-10-CM

## 2024-06-05 LAB
ALBUMIN SERPL-MCNC: 4.3 G/DL (ref 3.4–5)
ALBUMIN/GLOB SERPL: 1.7 {RATIO} (ref 1.1–2.2)
ALP SERPL-CCNC: 53 U/L (ref 40–129)
ALT SERPL-CCNC: 16 U/L (ref 10–40)
ANION GAP SERPL CALCULATED.3IONS-SCNC: 10 MMOL/L (ref 3–16)
AST SERPL-CCNC: 20 U/L (ref 15–37)
BILIRUB SERPL-MCNC: 0.4 MG/DL (ref 0–1)
BUN SERPL-MCNC: 18 MG/DL (ref 7–20)
CALCIUM SERPL-MCNC: 9.7 MG/DL (ref 8.3–10.6)
CHLORIDE SERPL-SCNC: 101 MMOL/L (ref 99–110)
CHOLEST SERPL-MCNC: 166 MG/DL (ref 0–199)
CO2 SERPL-SCNC: 25 MMOL/L (ref 21–32)
CREAT SERPL-MCNC: 0.6 MG/DL (ref 0.6–1.1)
DEPRECATED RDW RBC AUTO: 13.6 % (ref 12.4–15.4)
GFR SERPLBLD CREATININE-BSD FMLA CKD-EPI: >90 ML/MIN/{1.73_M2}
GLUCOSE SERPL-MCNC: 106 MG/DL (ref 70–99)
HCT VFR BLD AUTO: 43.1 % (ref 36–48)
HDLC SERPL-MCNC: 52 MG/DL (ref 40–60)
HGB BLD-MCNC: 15 G/DL (ref 12–16)
LDLC SERPL CALC-MCNC: 91 MG/DL
MCH RBC QN AUTO: 32.2 PG (ref 26–34)
MCHC RBC AUTO-ENTMCNC: 34.9 G/DL (ref 31–36)
MCV RBC AUTO: 92.3 FL (ref 80–100)
PLATELET # BLD AUTO: 233 K/UL (ref 135–450)
PLATELET BLD QL SMEAR: NORMAL
PMV BLD AUTO: 10.1 FL (ref 5–10.5)
POTASSIUM SERPL-SCNC: 4.6 MMOL/L (ref 3.5–5.1)
PROT SERPL-MCNC: 6.9 G/DL (ref 6.4–8.2)
RBC # BLD AUTO: 4.67 M/UL (ref 4–5.2)
SODIUM SERPL-SCNC: 136 MMOL/L (ref 136–145)
TRIGL SERPL-MCNC: 114 MG/DL (ref 0–150)
TSH SERPL DL<=0.005 MIU/L-ACNC: 1.87 UIU/ML (ref 0.27–4.2)
VLDLC SERPL CALC-MCNC: 23 MG/DL
WBC # BLD AUTO: 9.7 K/UL (ref 4–11)

## 2024-06-06 LAB
EST. AVERAGE GLUCOSE BLD GHB EST-MCNC: 91.1 MG/DL
HBA1C MFR BLD: 4.8 %

## 2024-06-25 ENCOUNTER — PATIENT MESSAGE (OUTPATIENT)
Age: 54
End: 2024-06-25

## 2024-06-25 ENCOUNTER — OFFICE VISIT (OUTPATIENT)
Age: 54
End: 2024-06-25
Payer: COMMERCIAL

## 2024-06-25 VITALS
RESPIRATION RATE: 16 BRPM | OXYGEN SATURATION: 97 % | TEMPERATURE: 97.2 F | DIASTOLIC BLOOD PRESSURE: 66 MMHG | SYSTOLIC BLOOD PRESSURE: 118 MMHG | WEIGHT: 210 LBS | BODY MASS INDEX: 30.13 KG/M2 | HEART RATE: 78 BPM

## 2024-06-25 DIAGNOSIS — K21.9 GASTROESOPHAGEAL REFLUX DISEASE, UNSPECIFIED WHETHER ESOPHAGITIS PRESENT: Chronic | ICD-10-CM

## 2024-06-25 DIAGNOSIS — J01.00 ACUTE NON-RECURRENT MAXILLARY SINUSITIS: Primary | ICD-10-CM

## 2024-06-25 PROCEDURE — 3074F SYST BP LT 130 MM HG: CPT | Performed by: FAMILY MEDICINE

## 2024-06-25 PROCEDURE — 3078F DIAST BP <80 MM HG: CPT | Performed by: FAMILY MEDICINE

## 2024-06-25 PROCEDURE — 99213 OFFICE O/P EST LOW 20 MIN: CPT | Performed by: FAMILY MEDICINE

## 2024-06-25 RX ORDER — MONTELUKAST SODIUM 10 MG/1
10 TABLET ORAL DAILY
Qty: 90 TABLET | Refills: 3 | Status: SHIPPED | OUTPATIENT
Start: 2024-06-25

## 2024-06-25 RX ORDER — AMOXICILLIN AND CLAVULANATE POTASSIUM 875; 125 MG/1; MG/1
1 TABLET, FILM COATED ORAL 2 TIMES DAILY
Qty: 14 TABLET | Refills: 0 | Status: SHIPPED | OUTPATIENT
Start: 2024-06-25 | End: 2024-07-02

## 2024-06-25 RX ORDER — OMEPRAZOLE 40 MG/1
40 CAPSULE, DELAYED RELEASE ORAL DAILY
Qty: 90 CAPSULE | Refills: 0 | Status: SHIPPED | OUTPATIENT
Start: 2024-06-25

## 2024-06-25 RX ORDER — FLUCONAZOLE 150 MG/1
150 TABLET ORAL ONCE
Qty: 1 TABLET | Refills: 0 | Status: SHIPPED | OUTPATIENT
Start: 2024-06-25 | End: 2024-06-25

## 2024-06-25 SDOH — ECONOMIC STABILITY: FOOD INSECURITY: WITHIN THE PAST 12 MONTHS, YOU WORRIED THAT YOUR FOOD WOULD RUN OUT BEFORE YOU GOT MONEY TO BUY MORE.: NEVER TRUE

## 2024-06-25 SDOH — ECONOMIC STABILITY: INCOME INSECURITY: HOW HARD IS IT FOR YOU TO PAY FOR THE VERY BASICS LIKE FOOD, HOUSING, MEDICAL CARE, AND HEATING?: NOT HARD AT ALL

## 2024-06-25 SDOH — ECONOMIC STABILITY: FOOD INSECURITY: WITHIN THE PAST 12 MONTHS, THE FOOD YOU BOUGHT JUST DIDN'T LAST AND YOU DIDN'T HAVE MONEY TO GET MORE.: NEVER TRUE

## 2024-06-25 NOTE — PROGRESS NOTES
Subjective:      Patient ID: Ila Mata 53 y.o. female. is here for evaluation for URI      HPI    8-9 days nasal congestion, ears full.  Last night woke up with severe pain in the left ear. Hearing is blunted.  No discharge from the hear.  + ear is tender to touch.  + popping and pressure.   No nasal discharge.  + left cheek and facial pain.  Has not felt feverish.  Denies cough, vertigo.  Some nausea with Zepbound.  Stable.   Rx: none    Allergies:  over-the-counter anti-histamine are no longer working.  Singulair helped in the past.     Outpatient Medications Marked as Taking for the 6/25/24 encounter (Office Visit) with Lesa Toribio MD   Medication Sig Dispense Refill    atorvastatin (LIPITOR) 20 MG tablet Take 1 tablet by mouth daily 90 tablet 1    Tirzepatide-Weight Management 15 MG/0.5ML SOAJ Inject 15 mg into the skin once a week 6 mL 1    omeprazole (PRILOSEC) 40 MG delayed release capsule Take 1 capsule by mouth daily 90 capsule 0    meloxicam (MOBIC) 15 MG tablet Take 1 tablet by mouth daily 90 tablet 1    valsartan-hydroCHLOROthiazide (DIOVAN-HCT) 160-25 MG per tablet Take 1 tablet by mouth daily (Patient taking differently: Take 0.5 tablets by mouth daily) 90 tablet 1    magnesium gluconate (MAGONATE) 500 MG tablet Take 1 tablet by mouth 2 times daily      Continuous Blood Gluc Sensor (FREESTYLE WALESKA 3 SENSOR) MISC 1 each by Does not apply route every 14 days 2 each 5    vitamin B-12 (CYANOCOBALAMIN) 100 MCG tablet Take 0.5 tablets by mouth daily      loratadine (CLARITIN) 10 MG tablet Take 1 tablet by mouth daily          Allergies   Allergen Reactions    Latex     Bactrim [Sulfamethoxazole-Trimethoprim] Shortness Of Breath and Rash       Patient Active Problem List   Diagnosis    Lymphedema of left lower extremity    FH: hemochromatosis    Essential hypertension    Pure hypercholesterolemia    Gastroesophageal reflux disease    Vitamin D deficiency    Hypertensive left ventricular

## 2024-06-25 NOTE — TELEPHONE ENCOUNTER
From: Ila Mata  To: Dr. Lesa Toribio  Sent: 6/25/2024 10:25 AM EDT  Subject: Singular prescription    Good morning,I was just at the pharmacy to  my new scripts and I noticed that the singular wasn't in there.Do we know if its just my pharmacy who has not filled it yet or was it simply forgotten and not sent?  Thank you,   Ila

## 2024-07-07 DIAGNOSIS — E66.01 CLASS 2 SEVERE OBESITY DUE TO EXCESS CALORIES WITH SERIOUS COMORBIDITY AND BODY MASS INDEX (BMI) OF 35.0 TO 35.9 IN ADULT (HCC): Chronic | ICD-10-CM

## 2024-07-08 NOTE — TELEPHONE ENCOUNTER
Requested Prescriptions     Pending Prescriptions Disp Refills    Tirzepatide-Weight Management 15 MG/0.5ML SOAJ 6 mL 1     Sig: Inject 15 mg into the skin once a week         Last OV: 6/25/2024    Last labs: 6/5/2024     F/u: Visit date not found

## 2024-07-30 ENCOUNTER — OFFICE VISIT (OUTPATIENT)
Age: 54
End: 2024-07-30
Payer: COMMERCIAL

## 2024-07-30 VITALS
DIASTOLIC BLOOD PRESSURE: 82 MMHG | BODY MASS INDEX: 29.78 KG/M2 | OXYGEN SATURATION: 99 % | RESPIRATION RATE: 16 BRPM | TEMPERATURE: 98.9 F | WEIGHT: 208 LBS | SYSTOLIC BLOOD PRESSURE: 128 MMHG | HEART RATE: 81 BPM | HEIGHT: 70 IN

## 2024-07-30 DIAGNOSIS — F32.9 REACTIVE DEPRESSION (SITUATIONAL): Primary | ICD-10-CM

## 2024-07-30 PROCEDURE — 99214 OFFICE O/P EST MOD 30 MIN: CPT | Performed by: FAMILY MEDICINE

## 2024-07-30 PROCEDURE — 3074F SYST BP LT 130 MM HG: CPT | Performed by: FAMILY MEDICINE

## 2024-07-30 PROCEDURE — 3079F DIAST BP 80-89 MM HG: CPT | Performed by: FAMILY MEDICINE

## 2024-07-30 RX ORDER — BUPROPION HYDROCHLORIDE 150 MG/1
150 TABLET ORAL EVERY MORNING
Qty: 90 TABLET | Refills: 1 | Status: SHIPPED | OUTPATIENT
Start: 2024-07-30 | End: 2024-08-02

## 2024-07-30 NOTE — PROGRESS NOTES
Subjective:      Patient ID: Ila Mata 53 y.o. female.is here for evaluation for depression       HPI    For the past 6 months is overwhelmed.  Gradually getting worse.  Family stress.  Not getting restful sleep.  Appetite is fine.  Low energy and low motivation.  No cleaning and exercising as she usually does.  No substance use.  Not suicidal.   is a good support.  Saw Dr. Rodriguez a few years ago.  Not much anxiety; only a problem when a passenger in a car.   Was on Wellbutrin when younger and for weight loss recently.  Wellbutrin 150 mg worked well, she did not tolerate the higher dose.   Wellbutrin helped her ADD as well.  Does not want to take stimulant.      Lab Results   Component Value Date     06/05/2024    K 4.6 06/05/2024     06/05/2024    CO2 25 06/05/2024    BUN 18 06/05/2024    CREATININE 0.6 06/05/2024    GLUCOSE 106 (H) 06/05/2024    CALCIUM 9.7 06/05/2024    BILITOT 0.4 06/05/2024    ALKPHOS 53 06/05/2024    AST 20 06/05/2024    ALT 16 06/05/2024    LABGLOM >90 06/05/2024    GFRAA >60 12/22/2021    AGRATIO 1.7 06/05/2024    GLOB 2.2 05/14/2021       Lab Results   Component Value Date    WBC 9.7 06/05/2024    HGB 15.0 06/05/2024    HCT 43.1 06/05/2024    MCV 92.3 06/05/2024     06/05/2024     TSH   Date Value Ref Range Status   06/05/2024 1.87 0.27 - 4.20 uIU/mL Final   01/13/2023 1.75 0.27 - 4.20 uIU/mL Final   12/22/2021 2.23 0.27 - 4.20 uIU/mL Final        Outpatient Medications Marked as Taking for the 7/30/24 encounter (Office Visit) with Lesa Toribio MD   Medication Sig Dispense Refill    Tirzepatide-Weight Management 15 MG/0.5ML SOAJ Inject 15 mg into the skin once a week 6 mL 0    omeprazole (PRILOSEC) 40 MG delayed release capsule Take 1 capsule by mouth daily 90 capsule 0    atorvastatin (LIPITOR) 20 MG tablet Take 1 tablet by mouth daily 90 tablet 1    meloxicam (MOBIC) 15 MG tablet Take 1 tablet by mouth daily 90 tablet 1

## 2024-08-02 ENCOUNTER — PATIENT MESSAGE (OUTPATIENT)
Age: 54
End: 2024-08-02

## 2024-08-02 DIAGNOSIS — F32.9 REACTIVE DEPRESSION (SITUATIONAL): ICD-10-CM

## 2024-08-02 RX ORDER — BUPROPION HYDROCHLORIDE 150 MG/1
150 TABLET ORAL EVERY MORNING
Qty: 10 TABLET | Refills: 0 | Status: SHIPPED | OUTPATIENT
Start: 2024-08-02

## 2024-08-02 NOTE — TELEPHONE ENCOUNTER
From: Ila Mata  To: Dr. Lesa Toribio  Sent: 8/2/2024 3:39 PM EDT  Subject: Forgot my medicine     Good afternoon , I left for vacation and realized I forgot to bring my Wellbutrin. Any chance to get a script for 8 pills ?   I will be in [place] tomorrow.   I could pick it up there.   Thanks   Ila     [facility] pharmacy   [address] [place], [place],  [phone]

## 2024-08-26 ENCOUNTER — OFFICE VISIT (OUTPATIENT)
Age: 54
End: 2024-08-26
Payer: COMMERCIAL

## 2024-08-26 DIAGNOSIS — F43.23 ADJUSTMENT DISORDER WITH MIXED ANXIETY AND DEPRESSED MOOD: Primary | ICD-10-CM

## 2024-08-26 PROCEDURE — 90791 PSYCH DIAGNOSTIC EVALUATION: CPT | Performed by: PSYCHOLOGIST

## 2024-08-26 ASSESSMENT — PROMIS GLOBAL HEALTH SCALE
IN THE PAST 7 DAYS, HOW WOULD YOU RATE YOUR FATIGUE ON AVERAGE [ON A SCALE FROM 1 (NONE) TO 5 (VERY SEVERE)]?: MODERATE
IN GENERAL, PLEASE RATE HOW WELL YOU CARRY OUT YOUR USUAL SOCIAL ACTIVITIES (INCLUDES ACTIVITIES AT HOME, AT WORK, AND IN YOUR COMMUNITY, AND RESPONSIBILITIES AS A PARENT, CHILD, SPOUSE, EMPLOYEE, FRIEND, ETC) [ON A SCALE OF 1 (POOR) TO 5 (EXCELLENT)]?: GOOD
IN GENERAL, HOW WOULD YOU RATE YOUR PHYSICAL HEALTH [ON A SCALE OF 1 (POOR) TO 5 (EXCELLENT)]?: VERY GOOD
IN GENERAL, WOULD YOU SAY YOUR HEALTH IS...[ON A SCALE OF 1 (POOR) TO 5 (EXCELLENT)]: VERY GOOD
IN THE PAST 7 DAYS, HOW WOULD YOU RATE YOUR PAIN ON AVERAGE [ON A SCALE FROM 0 (NO PAIN) TO 10 (WORST IMAGINABLE PAIN)]?: 5
TO WHAT EXTENT ARE YOU ABLE TO CARRY OUT YOUR EVERYDAY PHYSICAL ACTIVITIES SUCH AS WALKING, CLIMBING STAIRS, CARRYING GROCERIES, OR MOVING A CHAIR [ON A SCALE OF 1 (NOT AT ALL) TO 5 (COMPLETELY)]?: COMPLETELY
IN GENERAL, HOW WOULD YOU RATE YOUR SATISFACTION WITH YOUR SOCIAL ACTIVITIES AND RELATIONSHIPS [ON A SCALE OF 1 (POOR) TO 5 (EXCELLENT)]?: GOOD
IN GENERAL, WOULD YOU SAY YOUR QUALITY OF LIFE IS...[ON A SCALE OF 1 (POOR) TO 5 (EXCELLENT)]: VERY GOOD
SUM OF RESPONSES TO QUESTIONS 2, 4, 5, & 10: 12
IN GENERAL, HOW WOULD YOU RATE YOUR MENTAL HEALTH, INCLUDING YOUR MOOD AND YOUR ABILITY TO THINK [ON A SCALE OF 1 (POOR) TO 5 (EXCELLENT)]?: GOOD
IN THE PAST 7 DAYS, HOW OFTEN HAVE YOU BEEN BOTHERED BY EMOTIONAL PROBLEMS, SUCH AS FEELING ANXIOUS, DEPRESSED, OR IRRITABLE [ON A SCALE FROM 1 (NEVER) TO 5 (ALWAYS)]?: OFTEN
SUM OF RESPONSES TO QUESTIONS 3, 6, 7, & 8: 17

## 2024-08-26 NOTE — PROGRESS NOTES
Behavioral Health Consultation   Cleo Ash, PhD  Clinical Psychologist  8/26/2024      Time spent with Patient: 38 minutes  1:56-2:34  This is patient's 1st  [facility] appointment.    Reason for Consult:   Chief Complaint   Patient presents with    Depression    Anxiety       Referring Provider: Lesa Toribio MD    Pt provided informed consent for the behavioral health program. Discussed with patient model of service to include the limits of confidentiality (i.e. abuse reporting, suicide intervention, etc.) and short-term intervention focused approach. Pt indicated understanding.    Subjective  LIFE CONTEXT   1. Family  With whom do you live? Spouse Olav and 2 daughters in college   or partner? Yes Length of relationship? 23  Children?  Yes 2 girls - 19 and 21 - both at   Type of relationships with the people you live with? Good  Supportive relationships? Spouse    2. Social  Friends? Yes Quality? Good Frequency of contact? Usually daily, now twice a week  Other connection with community? no    3. Work/School  Work/go to school? Employed part time - [employer] - [employer]  How many years in this job? 6 How are you doing? Good  How do you support yourself financially? work    4. Recreation  For fun? Relaxation? Music, stays busy  How often? frequent    5. Self-Care  Exercise on a regular basis for health? Frequent  What type of exercise and how often? walking  daily 45 minutes a day  Sleep ok? up frequently at night , CPAP helps` Eat ok? Good  Use tobacco (what and how often)?  denies  Use caffeine (what and how often)?  Denies  Use alcohol (what and how often)? social drinker once a week - 1-2 beers  Use drugs (what and how often)? Never used  Problems in the past with tobacco, alcohol or drugs?  No  Medications: Take as prescribed? compliant all of the time    Current Medications:  Current Outpatient Medications   Medication Sig Dispense Refill    buPROPion (WELLBUTRIN XL) 150

## 2024-08-29 DIAGNOSIS — E66.01 CLASS 2 SEVERE OBESITY DUE TO EXCESS CALORIES WITH SERIOUS COMORBIDITY AND BODY MASS INDEX (BMI) OF 35.0 TO 35.9 IN ADULT (HCC): Chronic | ICD-10-CM

## 2024-08-30 NOTE — TELEPHONE ENCOUNTER
Requested Prescriptions     Pending Prescriptions Disp Refills    Tirzepatide-Weight Management 15 MG/0.5ML SOAJ 6 mL 0     Sig: Inject 15 mg into the skin once a week        Last Visit: 7/30/24    Last Labs: 6/5/24    Next Visit: 9/13/24

## 2024-09-03 DIAGNOSIS — F32.9 REACTIVE DEPRESSION (SITUATIONAL): ICD-10-CM

## 2024-09-03 RX ORDER — BUPROPION HYDROCHLORIDE 300 MG/1
300 TABLET ORAL EVERY MORNING
Qty: 90 TABLET | Refills: 1 | Status: SHIPPED | OUTPATIENT
Start: 2024-09-03
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
## 2024-09-10 ENCOUNTER — TELEPHONE (OUTPATIENT)
Age: 54
End: 2024-09-10

## 2024-10-01 DIAGNOSIS — K21.9 GASTROESOPHAGEAL REFLUX DISEASE, UNSPECIFIED WHETHER ESOPHAGITIS PRESENT: Chronic | ICD-10-CM

## 2024-10-01 RX ORDER — OMEPRAZOLE 40 MG/1
40 CAPSULE, DELAYED RELEASE ORAL DAILY
Qty: 90 CAPSULE | Refills: 0 | Status: SHIPPED | OUTPATIENT
Start: 2024-10-01

## 2024-10-01 NOTE — TELEPHONE ENCOUNTER
Requested Prescriptions     Pending Prescriptions Disp Refills    omeprazole (PRILOSEC) 40 MG delayed release capsule 90 capsule 0     Sig: Take 1 capsule by mouth daily         Last OV: 7/30/2024    Last labs: 6/5/24     F/u: Visit date not found

## 2024-10-03 ENCOUNTER — TELEPHONE (OUTPATIENT)
Age: 54
End: 2024-10-03

## 2024-10-03 DIAGNOSIS — F32.9 REACTIVE DEPRESSION (SITUATIONAL): Primary | ICD-10-CM

## 2024-10-03 NOTE — TELEPHONE ENCOUNTER
Patient called in-    Received her [facility] results  Missed Dr Toribio phone call   Would like to know will  be changing antidepressant medication   Please give patient a call back

## 2024-10-04 RX ORDER — DESVENLAFAXINE 50 MG/1
50 TABLET, FILM COATED, EXTENDED RELEASE ORAL DAILY
Qty: 30 TABLET | Refills: 2 | Status: SHIPPED | OUTPATIENT
Start: 2024-10-04

## 2024-10-04 NOTE — TELEPHONE ENCOUNTER
Discussed [facility].  Wellbutrin helps a little but higher dose is making her jittery and anxious.  Still has some depressed mood.  Stop Wellbutrin and start Pristiq. Side effects of current medications reviewed and questions answered.    Follow up in 2 weeks or prn.

## 2024-10-10 DIAGNOSIS — M75.81 TENDINITIS OF RIGHT ROTATOR CUFF: ICD-10-CM

## 2024-10-10 NOTE — TELEPHONE ENCOUNTER
Requested Prescriptions     Pending Prescriptions Disp Refills    meloxicam (MOBIC) 15 MG tablet 90 tablet 1     Sig: Take 1 tablet by mouth daily      Last Visit: 7/30/24    Last Labs: 6/5/24    Next Visit: None

## 2024-10-11 RX ORDER — MELOXICAM 15 MG/1
15 TABLET ORAL DAILY
Qty: 90 TABLET | Refills: 1 | Status: SHIPPED | OUTPATIENT
Start: 2024-10-11

## 2024-10-25 DIAGNOSIS — I10 ESSENTIAL HYPERTENSION: ICD-10-CM

## 2024-10-25 RX ORDER — VALSARTAN AND HYDROCHLOROTHIAZIDE 160; 25 MG/1; MG/1
0.5 TABLET ORAL DAILY
Qty: 90 TABLET | Refills: 1 | Status: SHIPPED | OUTPATIENT
Start: 2024-10-25

## 2024-10-25 NOTE — TELEPHONE ENCOUNTER
Requested Prescriptions     Pending Prescriptions Disp Refills    valsartan-hydroCHLOROthiazide (DIOVAN-HCT) 160-25 MG per tablet 90 tablet 1     Sig: Take 1 tablet by mouth daily         Last OV: 7/30/2024    Last labs: 6/5/2024     F/u: Visit date not found

## 2024-11-12 DIAGNOSIS — E66.01 CLASS 2 SEVERE OBESITY DUE TO EXCESS CALORIES WITH SERIOUS COMORBIDITY AND BODY MASS INDEX (BMI) OF 35.0 TO 35.9 IN ADULT: Chronic | ICD-10-CM

## 2024-11-12 DIAGNOSIS — E66.812 CLASS 2 SEVERE OBESITY DUE TO EXCESS CALORIES WITH SERIOUS COMORBIDITY AND BODY MASS INDEX (BMI) OF 35.0 TO 35.9 IN ADULT: Chronic | ICD-10-CM

## 2024-11-12 NOTE — TELEPHONE ENCOUNTER
Requested Prescriptions     Pending Prescriptions Disp Refills    tirzepatide-weight management (ZEPBOUND) 15 MG/0.5ML SOAJ subCUTAneous auto-injector pen 6 mL 2     Sig: Inject 15 mg into the skin once a week         Last OV: 7/30/2024    Last labs: 6/5/2024     F/u: Visit date not found

## 2024-12-05 DIAGNOSIS — F32.9 REACTIVE DEPRESSION (SITUATIONAL): ICD-10-CM

## 2024-12-05 RX ORDER — DESVENLAFAXINE 50 MG/1
50 TABLET, FILM COATED, EXTENDED RELEASE ORAL DAILY
Qty: 90 TABLET | Refills: 3 | Status: SHIPPED | OUTPATIENT
Start: 2024-12-05

## 2024-12-05 NOTE — TELEPHONE ENCOUNTER
Requested Prescriptions     Pending Prescriptions Disp Refills    desvenlafaxine succinate (PRISTIQ) 50 MG TB24 extended release tablet 30 tablet 2     Sig: Take 1 tablet by mouth daily      Left VM for pt to schedule Follow-up appt    Last Visit: 7/30/24    Last Labs: 6/5/24    Next Visit: None

## 2024-12-18 PROBLEM — E66.01 CLASS 2 SEVERE OBESITY DUE TO EXCESS CALORIES WITH SERIOUS COMORBIDITY AND BODY MASS INDEX (BMI) OF 35.0 TO 35.9 IN ADULT: Chronic | Status: RESOLVED | Noted: 2021-07-22 | Resolved: 2024-12-18

## 2024-12-18 PROBLEM — E66.812 CLASS 2 SEVERE OBESITY DUE TO EXCESS CALORIES WITH SERIOUS COMORBIDITY AND BODY MASS INDEX (BMI) OF 35.0 TO 35.9 IN ADULT: Chronic | Status: RESOLVED | Noted: 2021-07-22 | Resolved: 2024-12-18

## 2024-12-24 ENCOUNTER — PATIENT MESSAGE (OUTPATIENT)
Age: 54
End: 2024-12-24

## 2024-12-24 DIAGNOSIS — G47.33 OBSTRUCTIVE SLEEP APNEA SYNDROME: Primary | Chronic | ICD-10-CM

## 2024-12-24 DIAGNOSIS — E66.811 CLASS 1 OBESITY DUE TO EXCESS CALORIES WITH SERIOUS COMORBIDITY AND BODY MASS INDEX (BMI) OF 30.0 TO 30.9 IN ADULT: ICD-10-CM

## 2024-12-24 DIAGNOSIS — E66.09 CLASS 1 OBESITY DUE TO EXCESS CALORIES WITH SERIOUS COMORBIDITY AND BODY MASS INDEX (BMI) OF 30.0 TO 30.9 IN ADULT: ICD-10-CM

## 2024-12-24 RX ORDER — TIRZEPATIDE 2.5 MG/.5ML
2.5 INJECTION, SOLUTION SUBCUTANEOUS WEEKLY
Qty: 2 ML | Refills: 0 | Status: SHIPPED | OUTPATIENT
Start: 2024-12-24

## 2024-12-30 DIAGNOSIS — F32.9 REACTIVE DEPRESSION (SITUATIONAL): ICD-10-CM

## 2024-12-30 RX ORDER — DESVENLAFAXINE 50 MG/1
50 TABLET, FILM COATED, EXTENDED RELEASE ORAL DAILY
Qty: 90 TABLET | Refills: 3 | Status: SHIPPED | OUTPATIENT
Start: 2024-12-30

## 2024-12-30 NOTE — TELEPHONE ENCOUNTER
Requested Prescriptions     Pending Prescriptions Disp Refills    desvenlafaxine succinate (PRISTIQ) 50 MG TB24 extended release tablet 90 tablet 3     Sig: Take 1 tablet by mouth daily         Last OV: 7/30/2024    Last labs: 6/5/2024     F/u: Visit date not found

## 2025-01-10 ENCOUNTER — PATIENT MESSAGE (OUTPATIENT)
Age: 55
End: 2025-01-10

## 2025-01-10 DIAGNOSIS — F32.9 REACTIVE DEPRESSION (SITUATIONAL): ICD-10-CM

## 2025-01-10 RX ORDER — DESVENLAFAXINE 100 MG/1
100 TABLET, EXTENDED RELEASE ORAL DAILY
Qty: 90 TABLET | Refills: 1 | Status: SHIPPED | OUTPATIENT
Start: 2025-01-10

## 2025-01-13 ENCOUNTER — OFFICE VISIT (OUTPATIENT)
Age: 55
End: 2025-01-13
Payer: COMMERCIAL

## 2025-01-13 ENCOUNTER — PATIENT MESSAGE (OUTPATIENT)
Age: 55
End: 2025-01-13

## 2025-01-13 VITALS
OXYGEN SATURATION: 96 % | WEIGHT: 213.4 LBS | RESPIRATION RATE: 16 BRPM | BODY MASS INDEX: 30.62 KG/M2 | DIASTOLIC BLOOD PRESSURE: 80 MMHG | TEMPERATURE: 97.4 F | SYSTOLIC BLOOD PRESSURE: 130 MMHG | HEART RATE: 73 BPM

## 2025-01-13 DIAGNOSIS — M79.605 PAIN OF LEFT LOWER EXTREMITY: ICD-10-CM

## 2025-01-13 DIAGNOSIS — E66.811 CLASS 1 OBESITY DUE TO EXCESS CALORIES WITH SERIOUS COMORBIDITY AND BODY MASS INDEX (BMI) OF 30.0 TO 30.9 IN ADULT: ICD-10-CM

## 2025-01-13 DIAGNOSIS — R41.840 ATTENTION OR CONCENTRATION DEFICIT: Primary | ICD-10-CM

## 2025-01-13 DIAGNOSIS — I89.0 LYMPHEDEMA OF LEFT LOWER EXTREMITY: ICD-10-CM

## 2025-01-13 DIAGNOSIS — I89.0 LYMPHEDEMA OF LEFT LOWER EXTREMITY: Primary | ICD-10-CM

## 2025-01-13 DIAGNOSIS — R41.840 ATTENTION OR CONCENTRATION DEFICIT: ICD-10-CM

## 2025-01-13 DIAGNOSIS — E66.09 CLASS 1 OBESITY DUE TO EXCESS CALORIES WITH SERIOUS COMORBIDITY AND BODY MASS INDEX (BMI) OF 30.0 TO 30.9 IN ADULT: ICD-10-CM

## 2025-01-13 LAB
ANION GAP SERPL CALCULATED.3IONS-SCNC: 11 MMOL/L (ref 3–16)
BASOPHILS # BLD: 0 K/UL (ref 0–0.2)
BASOPHILS NFR BLD: 0.5 %
BUN SERPL-MCNC: 24 MG/DL (ref 7–20)
CALCIUM SERPL-MCNC: 9.6 MG/DL (ref 8.3–10.6)
CHLORIDE SERPL-SCNC: 102 MMOL/L (ref 99–110)
CO2 SERPL-SCNC: 26 MMOL/L (ref 21–32)
CREAT SERPL-MCNC: 0.7 MG/DL (ref 0.6–1.1)
D-DIMER QUANTITATIVE: 0.39 UG/ML FEU (ref 0–0.6)
DEPRECATED RDW RBC AUTO: 13.2 % (ref 12.4–15.4)
EOSINOPHIL # BLD: 0.2 K/UL (ref 0–0.6)
EOSINOPHIL NFR BLD: 2.2 %
GFR SERPLBLD CREATININE-BSD FMLA CKD-EPI: >90 ML/MIN/{1.73_M2}
GLUCOSE SERPL-MCNC: 82 MG/DL (ref 70–99)
HCT VFR BLD AUTO: 45.6 % (ref 36–48)
HGB BLD-MCNC: 15.2 G/DL (ref 12–16)
LYMPHOCYTES # BLD: 2.5 K/UL (ref 1–5.1)
LYMPHOCYTES NFR BLD: 27 %
MCH RBC QN AUTO: 31.4 PG (ref 26–34)
MCHC RBC AUTO-ENTMCNC: 33.4 G/DL (ref 31–36)
MCV RBC AUTO: 93.8 FL (ref 80–100)
MONOCYTES # BLD: 0.7 K/UL (ref 0–1.3)
MONOCYTES NFR BLD: 7.4 %
NEUTROPHILS # BLD: 5.9 K/UL (ref 1.7–7.7)
NEUTROPHILS NFR BLD: 62.9 %
PLATELET # BLD AUTO: 210 K/UL (ref 135–450)
PMV BLD AUTO: 9.4 FL (ref 5–10.5)
POTASSIUM SERPL-SCNC: 4.6 MMOL/L (ref 3.5–5.1)
RBC # BLD AUTO: 4.86 M/UL (ref 4–5.2)
SODIUM SERPL-SCNC: 139 MMOL/L (ref 136–145)
WBC # BLD AUTO: 9.3 K/UL (ref 4–11)

## 2025-01-13 PROCEDURE — 99214 OFFICE O/P EST MOD 30 MIN: CPT | Performed by: FAMILY MEDICINE

## 2025-01-13 PROCEDURE — 3079F DIAST BP 80-89 MM HG: CPT | Performed by: FAMILY MEDICINE

## 2025-01-13 PROCEDURE — 3075F SYST BP GE 130 - 139MM HG: CPT | Performed by: FAMILY MEDICINE

## 2025-01-13 RX ORDER — TRAMADOL HYDROCHLORIDE 50 MG/1
50 TABLET ORAL EVERY 6 HOURS PRN
Qty: 28 TABLET | Refills: 0 | Status: SHIPPED | OUTPATIENT
Start: 2025-01-13 | End: 2025-01-20

## 2025-01-13 RX ORDER — DEXTROAMPHETAMINE SACCHARATE, AMPHETAMINE ASPARTATE MONOHYDRATE, DEXTROAMPHETAMINE SULFATE AND AMPHETAMINE SULFATE 2.5; 2.5; 2.5; 2.5 MG/1; MG/1; MG/1; MG/1
10 CAPSULE, EXTENDED RELEASE ORAL DAILY
Qty: 30 CAPSULE | Refills: 0 | Status: SHIPPED | OUTPATIENT
Start: 2025-01-13 | End: 2025-02-12

## 2025-01-13 RX ORDER — DEXTROAMPHETAMINE SACCHARATE, AMPHETAMINE ASPARTATE, DEXTROAMPHETAMINE SULFATE AND AMPHETAMINE SULFATE 1.25; 1.25; 1.25; 1.25 MG/1; MG/1; MG/1; MG/1
5-10 TABLET ORAL DAILY
Qty: 60 TABLET | Refills: 0 | Status: SHIPPED | OUTPATIENT
Start: 2025-01-13 | End: 2025-02-12

## 2025-01-13 RX ORDER — LISDEXAMFETAMINE DIMESYLATE 20 MG/1
20 CAPSULE ORAL DAILY
Qty: 30 CAPSULE | Refills: 0 | Status: SHIPPED | OUTPATIENT
Start: 2025-01-13 | End: 2025-01-13

## 2025-01-13 SDOH — ECONOMIC STABILITY: FOOD INSECURITY: WITHIN THE PAST 12 MONTHS, THE FOOD YOU BOUGHT JUST DIDN'T LAST AND YOU DIDN'T HAVE MONEY TO GET MORE.: NEVER TRUE

## 2025-01-13 SDOH — ECONOMIC STABILITY: FOOD INSECURITY: WITHIN THE PAST 12 MONTHS, YOU WORRIED THAT YOUR FOOD WOULD RUN OUT BEFORE YOU GOT MONEY TO BUY MORE.: NEVER TRUE

## 2025-01-13 ASSESSMENT — PATIENT HEALTH QUESTIONNAIRE - PHQ9
5. POOR APPETITE OR OVEREATING: SEVERAL DAYS
10. IF YOU CHECKED OFF ANY PROBLEMS, HOW DIFFICULT HAVE THESE PROBLEMS MADE IT FOR YOU TO DO YOUR WORK, TAKE CARE OF THINGS AT HOME, OR GET ALONG WITH OTHER PEOPLE: SOMEWHAT DIFFICULT
3. TROUBLE FALLING OR STAYING ASLEEP: SEVERAL DAYS
1. LITTLE INTEREST OR PLEASURE IN DOING THINGS: MORE THAN HALF THE DAYS
SUM OF ALL RESPONSES TO PHQ QUESTIONS 1-9: 12
SUM OF ALL RESPONSES TO PHQ QUESTIONS 1-9: 12
4. FEELING TIRED OR HAVING LITTLE ENERGY: MORE THAN HALF THE DAYS
SUM OF ALL RESPONSES TO PHQ9 QUESTIONS 1 & 2: 4
9. THOUGHTS THAT YOU WOULD BE BETTER OFF DEAD, OR OF HURTING YOURSELF: NOT AT ALL
8. MOVING OR SPEAKING SO SLOWLY THAT OTHER PEOPLE COULD HAVE NOTICED. OR THE OPPOSITE, BEING SO FIGETY OR RESTLESS THAT YOU HAVE BEEN MOVING AROUND A LOT MORE THAN USUAL: MORE THAN HALF THE DAYS
6. FEELING BAD ABOUT YOURSELF - OR THAT YOU ARE A FAILURE OR HAVE LET YOURSELF OR YOUR FAMILY DOWN: NOT AT ALL
SUM OF ALL RESPONSES TO PHQ QUESTIONS 1-9: 12
7. TROUBLE CONCENTRATING ON THINGS, SUCH AS READING THE NEWSPAPER OR WATCHING TELEVISION: MORE THAN HALF THE DAYS
2. FEELING DOWN, DEPRESSED OR HOPELESS: MORE THAN HALF THE DAYS
SUM OF ALL RESPONSES TO PHQ QUESTIONS 1-9: 12

## 2025-01-13 NOTE — PROGRESS NOTES
Subjective:      Patient ID: Ila Mata 54 y.o. female. is here for evaluation for lymphedema.      HPI    Woke up with acute pain and increase in swelling in the left leg.  Larger than it has ever been.  She is not eating a high Na diet. She denies prolonged immobility/travel, chest pain or dyspnea.  The leg feels hard to the touch.  The pain is stabbing and constant.  She notes some chills.  Has not checked her temp.  Is using her support hose.   FH not sure if anyone had DVT or PE.    Rx; Tylenol is not helping.  Pain wakes her up.     Depression: was doing very well on the Pristiq 50mg.  She felt it was the best med she has been on.  Over the past month or so has been more depressed.  Low energy. Does have seasonal worsening in the past.  Was having trouble sleeping; better since her her pressure was increased on her CPAP.  Still has some trouble staying asleep.  Her 's snoring sometimes wakes her.  Not suicidal. Appetite is up a bit. Pristiq dose was increased 2 -3 days ago.  Is not seeing a therapist any longer.     Wonders if she has ADD.  Was always distracted, looking out the window, drawing in elementary school.   Has trouble staying on task, trouble finishing projects.  Has developed strategies such as charts to stay on task.  Wonders if medication would help.  Wellbutrin   FH: daughter, bro and mom have ADD    Outpatient Medications Marked as Taking for the 1/13/25 encounter (Office Visit) with Lesa Toribio MD   Medication Sig Dispense Refill    desvenlafaxine succinate (PRISTIQ) 100 MG TB24 extended release tablet Take 1 tablet by mouth daily 90 tablet 1    tirzepatide-weight management (ZEPBOUND) 2.5 MG/0.5ML SOAJ subCUTAneous auto-injector pen Inject 2.5 mLs into the skin once a week (Patient taking differently: Inject 15 mLs into the skin once a week) 2 mL 0    Multiple Vitamins-Minerals (HAIR SKIN AND NAILS FORMULA) TABS Take by mouth      valsartan-hydroCHLOROthiazide

## 2025-01-15 ENCOUNTER — TELEPHONE (OUTPATIENT)
Dept: ADMINISTRATIVE | Age: 55
End: 2025-01-15

## 2025-01-15 NOTE — TELEPHONE ENCOUNTER
Please do PA for tirzepatide-weight management (ZEPBOUND) 15 MG/0.5ML SOAJ subCUTAneous auto-injector pen.     Thank you.

## 2025-01-17 NOTE — TELEPHONE ENCOUNTER
Submitted PA for tirzepatide-weight management (ZEPBOUND) 15 MG/0.5ML SOAJ subCUTAneous auto-injector pen   Via [facility] FAX FORM TO [facility] STATUS: PENDING.    Follow up done daily; if no decision with in three days we will refax.  If another three days goes by with no decision will call the insurance for status.

## 2025-01-22 NOTE — TELEPHONE ENCOUNTER
The medication was DENIED; DENIAL letter is uploaded to MEDIA.    Generic Denial: Drug is not covered by the plan ( Plan Exclusion)     Other; please see Denial Letter.       Note :        If this requires a response please respond to the pool ( P MHCX PSC MEDICATION PRE-AUTH).      Thank you please advise patient.

## 2025-02-10 ENCOUNTER — PATIENT MESSAGE (OUTPATIENT)
Age: 55
End: 2025-02-10

## 2025-02-10 DIAGNOSIS — R41.840 ATTENTION OR CONCENTRATION DEFICIT: Primary | ICD-10-CM

## 2025-02-10 RX ORDER — DEXTROAMPHETAMINE SACCHARATE, AMPHETAMINE ASPARTATE, DEXTROAMPHETAMINE SULFATE AND AMPHETAMINE SULFATE 2.5; 2.5; 2.5; 2.5 MG/1; MG/1; MG/1; MG/1
10 TABLET ORAL DAILY
Qty: 30 TABLET | Refills: 0 | Status: SHIPPED | OUTPATIENT
Start: 2025-02-10 | End: 2025-03-12

## 2025-02-10 RX ORDER — DEXTROAMPHETAMINE SACCHARATE, AMPHETAMINE ASPARTATE MONOHYDRATE, DEXTROAMPHETAMINE SULFATE AND AMPHETAMINE SULFATE 3.75; 3.75; 3.75; 3.75 MG/1; MG/1; MG/1; MG/1
15 CAPSULE, EXTENDED RELEASE ORAL DAILY
Qty: 30 CAPSULE | Refills: 0 | Status: SHIPPED | OUTPATIENT
Start: 2025-02-10 | End: 2025-03-12

## 2025-03-09 DIAGNOSIS — K21.9 GASTROESOPHAGEAL REFLUX DISEASE, UNSPECIFIED WHETHER ESOPHAGITIS PRESENT: Chronic | ICD-10-CM

## 2025-03-10 RX ORDER — OMEPRAZOLE 40 MG/1
40 CAPSULE, DELAYED RELEASE ORAL DAILY
Qty: 90 CAPSULE | Refills: 1 | Status: SHIPPED | OUTPATIENT
Start: 2025-03-10

## 2025-03-10 NOTE — TELEPHONE ENCOUNTER
Requested Prescriptions     Pending Prescriptions Disp Refills    omeprazole (PRILOSEC) 40 MG delayed release capsule 90 capsule 0     Sig: Take 1 capsule by mouth daily         Last OV: 1/13/2025    Last labs: 1/13/2025     F/u: Visit date not found

## 2025-03-24 ENCOUNTER — PATIENT MESSAGE (OUTPATIENT)
Age: 55
End: 2025-03-24

## 2025-03-24 DIAGNOSIS — R41.840 ATTENTION OR CONCENTRATION DEFICIT: Primary | ICD-10-CM

## 2025-03-24 RX ORDER — DEXTROAMPHETAMINE SACCHARATE, AMPHETAMINE ASPARTATE MONOHYDRATE, DEXTROAMPHETAMINE SULFATE AND AMPHETAMINE SULFATE 6.25; 6.25; 6.25; 6.25 MG/1; MG/1; MG/1; MG/1
25 CAPSULE, EXTENDED RELEASE ORAL DAILY
Qty: 30 CAPSULE | Refills: 0 | Status: SHIPPED | OUTPATIENT
Start: 2025-03-24 | End: 2025-04-23

## 2025-03-24 NOTE — TELEPHONE ENCOUNTER
Requested Prescriptions     Pending Prescriptions Disp Refills    amphetamine-dextroamphetamine (ADDERALL XR) 25 MG extended release capsule 30 capsule 0     Sig: Take 1 capsule by mouth daily for 30 days. Max Daily Amount: 25 mg         Last OV: 1/13/2025    Last labs: 1/13/2025     F/u: Visit date not found

## 2025-04-19 DIAGNOSIS — M75.81 TENDINITIS OF RIGHT ROTATOR CUFF: ICD-10-CM

## 2025-04-19 DIAGNOSIS — R41.840 ATTENTION OR CONCENTRATION DEFICIT: ICD-10-CM

## 2025-04-19 RX ORDER — DEXTROAMPHETAMINE SACCHARATE, AMPHETAMINE ASPARTATE MONOHYDRATE, DEXTROAMPHETAMINE SULFATE AND AMPHETAMINE SULFATE 6.25; 6.25; 6.25; 6.25 MG/1; MG/1; MG/1; MG/1
25 CAPSULE, EXTENDED RELEASE ORAL DAILY
Qty: 30 CAPSULE | Refills: 0 | Status: CANCELLED | OUTPATIENT
Start: 2025-04-19 | End: 2025-05-19

## 2025-04-21 DIAGNOSIS — R41.840 ATTENTION OR CONCENTRATION DEFICIT: ICD-10-CM

## 2025-04-21 RX ORDER — DEXTROAMPHETAMINE SACCHARATE, AMPHETAMINE ASPARTATE MONOHYDRATE, DEXTROAMPHETAMINE SULFATE AND AMPHETAMINE SULFATE 6.25; 6.25; 6.25; 6.25 MG/1; MG/1; MG/1; MG/1
25 CAPSULE, EXTENDED RELEASE ORAL DAILY
Qty: 30 CAPSULE | Refills: 0 | Status: CANCELLED | OUTPATIENT
Start: 2025-04-21 | End: 2025-05-21

## 2025-04-21 RX ORDER — MELOXICAM 15 MG/1
15 TABLET ORAL DAILY
Qty: 90 TABLET | Refills: 1 | Status: SHIPPED | OUTPATIENT
Start: 2025-04-21

## 2025-04-21 NOTE — TELEPHONE ENCOUNTER
Requested Prescriptions     Pending Prescriptions Disp Refills    meloxicam (MOBIC) 15 MG tablet 90 tablet 1     Sig: Take 1 tablet by mouth daily      Last Visit: 1/13/25    Last Labs: 1/13/25    Next Visit: none

## 2025-04-21 NOTE — TELEPHONE ENCOUNTER
Requested Prescriptions     Pending Prescriptions Disp Refills    amphetamine-dextroamphetamine (ADDERALL XR) 25 MG extended release capsule 30 capsule 0     Sig: Take 1 capsule by mouth daily for 30 days. Max Daily Amount: 25 mg      Last Visit: 1/13/25    Last Labs: 1/13/25    Next Visit: None    Dosage was increased to 25 MG last month, OK to refill?

## 2025-04-23 PROBLEM — D12.6 TUBULAR ADENOMA OF COLON: Status: ACTIVE | Noted: 2025-04-23

## 2025-04-23 PROBLEM — D12.6 TUBULAR ADENOMA OF COLON: Status: ACTIVE | Noted: 2020-02-26

## 2025-04-28 ENCOUNTER — OFFICE VISIT (OUTPATIENT)
Age: 55
End: 2025-04-28
Payer: COMMERCIAL

## 2025-04-28 VITALS
WEIGHT: 220 LBS | OXYGEN SATURATION: 97 % | DIASTOLIC BLOOD PRESSURE: 70 MMHG | TEMPERATURE: 98.8 F | SYSTOLIC BLOOD PRESSURE: 126 MMHG | BODY MASS INDEX: 31.57 KG/M2 | RESPIRATION RATE: 16 BRPM | HEART RATE: 72 BPM

## 2025-04-28 DIAGNOSIS — D12.6 TUBULAR ADENOMA OF COLON: ICD-10-CM

## 2025-04-28 DIAGNOSIS — Z12.31 ENCOUNTER FOR SCREENING MAMMOGRAM FOR MALIGNANT NEOPLASM OF BREAST: ICD-10-CM

## 2025-04-28 DIAGNOSIS — R41.840 ATTENTION OR CONCENTRATION DEFICIT: Primary | ICD-10-CM

## 2025-04-28 DIAGNOSIS — Z23 NEED FOR SHINGLES VACCINE: ICD-10-CM

## 2025-04-28 DIAGNOSIS — I10 ESSENTIAL HYPERTENSION: Chronic | ICD-10-CM

## 2025-04-28 DIAGNOSIS — Z23 NEED FOR HEPATITIS B VACCINATION: ICD-10-CM

## 2025-04-28 DIAGNOSIS — Z79.899 HIGH RISK MEDICATION USE: ICD-10-CM

## 2025-04-28 PROCEDURE — 99214 OFFICE O/P EST MOD 30 MIN: CPT | Performed by: FAMILY MEDICINE

## 2025-04-28 PROCEDURE — 3074F SYST BP LT 130 MM HG: CPT | Performed by: FAMILY MEDICINE

## 2025-04-28 PROCEDURE — 90472 IMMUNIZATION ADMIN EACH ADD: CPT | Performed by: FAMILY MEDICINE

## 2025-04-28 PROCEDURE — 90471 IMMUNIZATION ADMIN: CPT | Performed by: FAMILY MEDICINE

## 2025-04-28 PROCEDURE — 90750 HZV VACC RECOMBINANT IM: CPT | Performed by: FAMILY MEDICINE

## 2025-04-28 PROCEDURE — 90746 HEPB VACCINE 3 DOSE ADULT IM: CPT | Performed by: FAMILY MEDICINE

## 2025-04-28 PROCEDURE — 3078F DIAST BP <80 MM HG: CPT | Performed by: FAMILY MEDICINE

## 2025-04-28 PROCEDURE — 80305 DRUG TEST PRSMV DIR OPT OBS: CPT | Performed by: FAMILY MEDICINE

## 2025-04-28 RX ORDER — DEXTROAMPHETAMINE SACCHARATE, AMPHETAMINE ASPARTATE MONOHYDRATE, DEXTROAMPHETAMINE SULFATE AND AMPHETAMINE SULFATE 6.25; 6.25; 6.25; 6.25 MG/1; MG/1; MG/1; MG/1
25 CAPSULE, EXTENDED RELEASE ORAL DAILY
Qty: 30 CAPSULE | Refills: 0 | Status: SHIPPED | OUTPATIENT
Start: 2025-05-28 | End: 2025-06-27

## 2025-04-28 RX ORDER — DEXTROAMPHETAMINE SACCHARATE, AMPHETAMINE ASPARTATE MONOHYDRATE, DEXTROAMPHETAMINE SULFATE AND AMPHETAMINE SULFATE 6.25; 6.25; 6.25; 6.25 MG/1; MG/1; MG/1; MG/1
25 CAPSULE, EXTENDED RELEASE ORAL DAILY
Qty: 30 CAPSULE | Refills: 0 | Status: SHIPPED | OUTPATIENT
Start: 2025-06-27 | End: 2025-07-27

## 2025-04-28 RX ORDER — VALSARTAN 80 MG/1
80 TABLET ORAL DAILY
Qty: 90 TABLET | Refills: 1 | Status: SHIPPED | OUTPATIENT
Start: 2025-04-28

## 2025-04-28 RX ORDER — DEXTROAMPHETAMINE SACCHARATE, AMPHETAMINE ASPARTATE MONOHYDRATE, DEXTROAMPHETAMINE SULFATE AND AMPHETAMINE SULFATE 6.25; 6.25; 6.25; 6.25 MG/1; MG/1; MG/1; MG/1
25 CAPSULE, EXTENDED RELEASE ORAL DAILY
Qty: 30 CAPSULE | Refills: 0 | Status: SHIPPED | OUTPATIENT
Start: 2025-04-28 | End: 2025-05-28

## 2025-04-28 NOTE — PROGRESS NOTES
Immunizations Administered       Name Date Dose Route    Hep B, ENGERIX-B, (age 20y+), IM, 1mL 4/28/2025 1 mL Intramuscular    Site: Deltoid- Right    Lot: [id number]    NDC: 60341-661-53    Zoster Recombinant (Shingrix) 4/28/2025 0.5 mL Intramuscular    Site: Deltoid- Left    Lot: [id number]    NDC: 15197-584-87            
Shortness Of Breath and Rash       Patient Active Problem List   Diagnosis    Lymphedema of left lower extremity    FH: hemochromatosis    Essential hypertension    Pure hypercholesterolemia    Gastroesophageal reflux disease    Vitamin D deficiency    Hypertensive left ventricular hypertrophy, without heart failure    Obstructive sleep apnea syndrome    Shoulder pain, acute    Shoulder pain, left    Attention or concentration deficit    Tubular adenoma of colon       Past Medical History:   Diagnosis Date    Hypercholesteremia 2009    Hypertension     Lymphedema 1979    LLE, after lipoma removal with lymph node resection    Obstructive sleep apnea syndrome 2022    Sepsis 2018    LLE cellulitis       Past Surgical History:   Procedure Laterality Date    BLADDER SURGERY  2009    bladder suspension     SECTION      x2    ECTOPIC PREGNANCY SURGERY      LYMPH NODE DISSECTION Left 1979    removal of lymph node removal left groin    OTHER SURGICAL HISTORY      septum removal in uterus        Family History   Problem Relation Age of Onset    Hypertension Mother     Thyroid Disease Mother     Osteoporosis Mother     Breast Cancer Sister 42    Thyroid Disease Sister         Grave's disease    Hemochromatosis Maternal Grandmother     Diabetes Paternal Grandmother        Social History     Tobacco Use    Smoking status: Former     Current packs/day: 0.00     Average packs/day: 1 pack/day for 8.0 years (8.0 ttl pk-yrs)     Types: Cigarettes     Start date: 1994     Quit date: 2002     Years since quittin.6    Smokeless tobacco: Never   Vaping Use    Vaping status: Never Used   Substance Use Topics    Alcohol use: Yes     Alcohol/week: 2.0 standard drinks of alcohol     Types: 2 Glasses of wine per week     Comment: 2 on the weekends occasionally    Drug use: No            Review of Systems  Review of Systems    Objective:   Physical Exam  Vitals:    25 1524   BP: 126/70   BP Site: Left

## 2025-04-29 LAB
ALCOHOL URINE: ABNORMAL
AMPHETAMINE SCREEN URINE: POSITIVE
BARBITURATE SCREEN URINE: ABNORMAL
BENZODIAZEPINE SCREEN, URINE: ABNORMAL
BUPRENORPHINE URINE: ABNORMAL
COCAINE METABOLITE SCREEN URINE: ABNORMAL
FENTANYL SCREEN, URINE: ABNORMAL
GABAPENTIN SCREEN, URINE: ABNORMAL
MDMA, URINE: ABNORMAL
METHADONE SCREEN, URINE: ABNORMAL
METHAMPHETAMINE, URINE: ABNORMAL
OPIATE SCREEN URINE: ABNORMAL
OXYCODONE SCREEN URINE: ABNORMAL
PHENCYCLIDINE SCREEN URINE: ABNORMAL
PROPOXYPHENE SCREEN, URINE: ABNORMAL
SYNTHETIC CANNABINOIDS(K2) SCREEN, URINE: ABNORMAL
THC SCREEN, URINE: ABNORMAL
TRAMADOL SCREEN URINE: ABNORMAL
TRICYCLIC ANTIDEPRESSANTS, UR: ABNORMAL

## 2025-05-20 ENCOUNTER — OFFICE VISIT (OUTPATIENT)
Dept: ORTHOPEDIC SURGERY | Age: 55
End: 2025-05-20

## 2025-05-20 VITALS — RESPIRATION RATE: 12 BRPM | WEIGHT: 220 LBS | BODY MASS INDEX: 31.5 KG/M2 | HEIGHT: 70 IN

## 2025-05-20 DIAGNOSIS — M19.041 OSTEOARTHRITIS OF FINGERS OF HANDS, BILATERAL: Primary | ICD-10-CM

## 2025-05-20 DIAGNOSIS — M19.042 OSTEOARTHRITIS OF FINGERS OF HANDS, BILATERAL: Primary | ICD-10-CM

## 2025-05-20 NOTE — PROGRESS NOTES
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  Symptoms have been present for several years years, with intervals of apparent remission.  There is no history of injury.  Pain is most severe on the dorsal aspect of the fingers and basilar aspect of the thumbs.  It is absent at rest and aggravated by movement, lifting and gripping. It does not radiate.  Swelling and deformity have been noticed.  Pain has been noticed in other joint areas (shoulder, knees, hips acromioclavicular joints).  There is a positive family history of osteoarthritis. Symptoms have not changed recently. The pain assessment has been reviewed and is correct.    The patient's social history, past medical history, family history, medications, allergies and review of systems, entered 5/20/25, have been reviewed, and dated and are recorded in the chart.    On physical examination the patient is Height: 177.8 cm (5' 10\") tall and weighs Weight - Scale: 99.8 kg (220 lb).  Respirations are 18 per minute.  The patient is well nourished, is oriented to time and place, demonstrates appropriate mood and affect as well as normal gait and station.  There are deformities of the DIP joints of all fingers consistent with osteoarthritis. There is mild soft tissue swelling present about the bilateral thumb CMC joints, R>L..  There is no discoloration.  Tenderness is not present on palpation.  Range of motion of the fingers, thumbs and wrists is mildly limited bilaterally and is accompanied by pain.  Skin is intact, as is distal circulation and sensation.  Gross muscle strength is normal bilaterally.  Hand and wrist joints are stable.  There are no subcutaneous nodules or enlarged epitrochlear lymph nodes.    I have personally reviewed and interpreted all pertinent external imaging studies (Xrays), laboratory tests (Drug Screen, CBC+Diff, BMP,

## 2025-06-04 ENCOUNTER — PATIENT MESSAGE (OUTPATIENT)
Age: 55
End: 2025-06-04

## 2025-06-04 DIAGNOSIS — I10 ESSENTIAL HYPERTENSION: ICD-10-CM

## 2025-06-04 RX ORDER — VALSARTAN AND HYDROCHLOROTHIAZIDE 160; 25 MG/1; MG/1
1 TABLET ORAL DAILY
Qty: 90 TABLET | Refills: 1
Start: 2025-06-04

## 2025-07-11 ENCOUNTER — TELEMEDICINE (OUTPATIENT)
Age: 55
End: 2025-07-11

## 2025-07-11 DIAGNOSIS — Z83.49 FH: HEMOCHROMATOSIS: ICD-10-CM

## 2025-07-11 DIAGNOSIS — I10 ESSENTIAL HYPERTENSION: Chronic | ICD-10-CM

## 2025-07-11 DIAGNOSIS — Z00.00 WELL ADULT EXAM: ICD-10-CM

## 2025-07-11 DIAGNOSIS — J01.00 ACUTE NON-RECURRENT MAXILLARY SINUSITIS: Primary | ICD-10-CM

## 2025-07-11 DIAGNOSIS — R73.02 IMPAIRED GLUCOSE TOLERANCE: ICD-10-CM

## 2025-07-11 RX ORDER — VALSARTAN AND HYDROCHLOROTHIAZIDE 160; 12.5 MG/1; MG/1
1 TABLET, FILM COATED ORAL DAILY
Qty: 90 TABLET | Refills: 1 | Status: SHIPPED | OUTPATIENT
Start: 2025-07-11

## 2025-07-11 RX ORDER — CEFUROXIME AXETIL 500 MG/1
500 TABLET ORAL 2 TIMES DAILY
Qty: 14 TABLET | Refills: 0 | Status: SHIPPED | OUTPATIENT
Start: 2025-07-11 | End: 2025-07-18

## 2025-07-12 DIAGNOSIS — F32.9 REACTIVE DEPRESSION (SITUATIONAL): ICD-10-CM

## 2025-07-14 ENCOUNTER — PATIENT MESSAGE (OUTPATIENT)
Age: 55
End: 2025-07-14

## 2025-07-14 DIAGNOSIS — M19.049 ARTHRITIS PAIN, HAND: ICD-10-CM

## 2025-07-14 DIAGNOSIS — M19.049 ARTHRITIS PAIN, HAND: Primary | ICD-10-CM

## 2025-07-14 RX ORDER — DESVENLAFAXINE 100 MG/1
100 TABLET, EXTENDED RELEASE ORAL DAILY
Qty: 90 TABLET | Refills: 1 | Status: SHIPPED | OUTPATIENT
Start: 2025-07-14

## 2025-07-14 NOTE — TELEPHONE ENCOUNTER
Patient has upcoming Medication  VV Visit medication appt  on the 25th   She would like to know if she can get the RA test ordered/ done first while she has a flare up then discuss results at next appt ?     I have pt schedule to come in office today at 2pm.. ESSIE

## 2025-07-14 NOTE — TELEPHONE ENCOUNTER
Requested Prescriptions     Pending Prescriptions Disp Refills    desvenlafaxine succinate (PRISTIQ) 100 MG TB24 extended release tablet 90 tablet 1     Sig: Take 1 tablet by mouth daily         Last OV: 7/11/2025    Last labs: 1/13/2025     F/u: 7/25/2025

## 2025-07-15 ENCOUNTER — RESULTS FOLLOW-UP (OUTPATIENT)
Age: 55
End: 2025-07-15

## 2025-07-15 DIAGNOSIS — Z83.49 FH: HEMOCHROMATOSIS: Primary | ICD-10-CM

## 2025-07-15 DIAGNOSIS — Z83.49 FH: HEMOCHROMATOSIS: ICD-10-CM

## 2025-07-15 DIAGNOSIS — E78.00 PURE HYPERCHOLESTEROLEMIA: Chronic | ICD-10-CM

## 2025-07-15 DIAGNOSIS — E66.09 CLASS 1 OBESITY DUE TO EXCESS CALORIES WITHOUT SERIOUS COMORBIDITY WITH BODY MASS INDEX (BMI) OF 31.0 TO 31.9 IN ADULT: ICD-10-CM

## 2025-07-15 DIAGNOSIS — E66.811 CLASS 1 OBESITY DUE TO EXCESS CALORIES WITHOUT SERIOUS COMORBIDITY WITH BODY MASS INDEX (BMI) OF 31.0 TO 31.9 IN ADULT: ICD-10-CM

## 2025-07-15 DIAGNOSIS — Z00.00 WELL ADULT EXAM: ICD-10-CM

## 2025-07-15 LAB
ALBUMIN SERPL-MCNC: 4.5 G/DL (ref 3.4–5)
ALBUMIN/GLOB SERPL: 2 {RATIO} (ref 1.1–2.2)
ALP SERPL-CCNC: 57 U/L (ref 40–129)
ALT SERPL-CCNC: 36 U/L (ref 10–40)
ANA SER QL IA: NEGATIVE
ANION GAP SERPL CALCULATED.3IONS-SCNC: 14 MMOL/L (ref 3–16)
AST SERPL-CCNC: 27 U/L (ref 15–37)
BILIRUB SERPL-MCNC: 0.3 MG/DL (ref 0–1)
BUN SERPL-MCNC: 19 MG/DL (ref 7–20)
CALCIUM SERPL-MCNC: 9.1 MG/DL (ref 8.3–10.6)
CCP IGG SERPL-ACNC: <0.5 U/ML (ref 0–2.9)
CHLORIDE SERPL-SCNC: 99 MMOL/L (ref 99–110)
CHOLEST SERPL-MCNC: 252 MG/DL (ref 0–199)
CO2 SERPL-SCNC: 23 MMOL/L (ref 21–32)
CREAT SERPL-MCNC: 0.9 MG/DL (ref 0.6–1.1)
CRP SERPL-MCNC: <3 MG/L (ref 0–5.1)
FERRITIN SERPL IA-MCNC: 285 NG/ML (ref 15–150)
GFR SERPLBLD CREATININE-BSD FMLA CKD-EPI: 76 ML/MIN/{1.73_M2}
GLUCOSE SERPL-MCNC: 130 MG/DL (ref 70–99)
HDLC SERPL-MCNC: 49 MG/DL (ref 40–60)
IRON SATN MFR SERPL: 35 % (ref 15–50)
IRON SERPL-MCNC: 97 UG/DL (ref 37–145)
LDLC SERPL CALC-MCNC: 173 MG/DL
POTASSIUM SERPL-SCNC: 4.1 MMOL/L (ref 3.5–5.1)
PROT SERPL-MCNC: 6.8 G/DL (ref 6.4–8.2)
RHEUMATOID FACT SER IA-ACNC: <10 IU/ML
SODIUM SERPL-SCNC: 136 MMOL/L (ref 136–145)
TIBC SERPL-MCNC: 281 UG/DL (ref 260–445)
TRIGL SERPL-MCNC: 152 MG/DL (ref 0–150)
TSH SERPL DL<=0.005 MIU/L-ACNC: 1.88 UIU/ML (ref 0.27–4.2)
VLDLC SERPL CALC-MCNC: 30 MG/DL

## 2025-07-18 NOTE — PROGRESS NOTES
2025    TELEHEALTH EVALUATION -- Audio/Visual    HPI:    Ila Mata (:  1970) has requested an audio/video evaluation for the following concern(s):    The primary encounter diagnosis was Attention or concentration deficit. A diagnosis of Essential hypertension was also pertinent to this visit.    ADD/ADHD:  Current treatment: Adderall XR- 25 mg; occasionally takes Adderall 10 mg, which has been somewhat effective.  Residual symptoms: inattention. Manageable.  Medication side effects: insomnia but does not feel it is related to medication.  No trouble falling to sleep but has trouble staying asleep.  Patient denies palpitations and anxiety.   PDMP reviewed and no concerns noted.  Last filled Adderall XR 35 and IR 25.   The last medication management agreement was signed 25  The last UDS was unremarkable on 25.    Hypertension: Patient here for follow-up of elevated blood pressure. She is exercising and is adherent to low salt diet.  Blood pressure is well controlled at home. 120-130/ 70-80.  Cardiac symptoms palpitations. Few times a week in the am.   Stopped all caffeine.  Lasts a few seconds.  Patient denies dyspnea, exertional chest pressure/discomfort, and lower extremity edema.  Cardiovascular risk factors: hypertension. Use of agents associated with hypertension: amphetamines. History of target organ damage: none.    Had itching all over with Ceftin but no rash.    Lab Results   Component Value Date/Time     2025 03:12 PM    K 4.1 2025 03:12 PM    K 3.3 10/27/2023 09:05 AM    CL 99 2025 03:12 PM    CO2 23 2025 03:12 PM    BUN 19 2025 03:12 PM    CREATININE 0.9 2025 03:12 PM    GLUCOSE 130 2025 03:12 PM    CALCIUM 9.1 2025 03:12 PM    LABGLOM 76 2025 03:12 PM    LABGLOM >60 10/29/2023 07:32 AM       Review of Systems    Outpatient Medications Marked as Taking for the 25 encounter (Telemedicine) with Maul

## 2025-07-25 ENCOUNTER — TELEMEDICINE (OUTPATIENT)
Age: 55
End: 2025-07-25

## 2025-07-25 DIAGNOSIS — I10 ESSENTIAL HYPERTENSION: Chronic | ICD-10-CM

## 2025-07-25 DIAGNOSIS — G47.33 OBSTRUCTIVE SLEEP APNEA SYNDROME: Chronic | ICD-10-CM

## 2025-07-25 DIAGNOSIS — E66.09 CLASS 1 OBESITY DUE TO EXCESS CALORIES WITH SERIOUS COMORBIDITY AND BODY MASS INDEX (BMI) OF 30.0 TO 30.9 IN ADULT: ICD-10-CM

## 2025-07-25 DIAGNOSIS — R41.840 ATTENTION OR CONCENTRATION DEFICIT: Primary | ICD-10-CM

## 2025-07-25 DIAGNOSIS — E66.811 CLASS 1 OBESITY DUE TO EXCESS CALORIES WITH SERIOUS COMORBIDITY AND BODY MASS INDEX (BMI) OF 30.0 TO 30.9 IN ADULT: ICD-10-CM

## 2025-07-25 RX ORDER — DEXTROAMPHETAMINE SACCHARATE, AMPHETAMINE ASPARTATE MONOHYDRATE, DEXTROAMPHETAMINE SULFATE AND AMPHETAMINE SULFATE 6.25; 6.25; 6.25; 6.25 MG/1; MG/1; MG/1; MG/1
25 CAPSULE, EXTENDED RELEASE ORAL DAILY
Qty: 30 CAPSULE | Refills: 0 | Status: SHIPPED | OUTPATIENT
Start: 2025-09-23 | End: 2025-10-23

## 2025-07-25 RX ORDER — DEXTROAMPHETAMINE SACCHARATE, AMPHETAMINE ASPARTATE MONOHYDRATE, DEXTROAMPHETAMINE SULFATE AND AMPHETAMINE SULFATE 6.25; 6.25; 6.25; 6.25 MG/1; MG/1; MG/1; MG/1
25 CAPSULE, EXTENDED RELEASE ORAL DAILY
Qty: 30 CAPSULE | Refills: 0 | Status: SHIPPED | OUTPATIENT
Start: 2025-07-25 | End: 2025-08-24

## 2025-07-25 RX ORDER — DEXTROAMPHETAMINE SACCHARATE, AMPHETAMINE ASPARTATE MONOHYDRATE, DEXTROAMPHETAMINE SULFATE AND AMPHETAMINE SULFATE 6.25; 6.25; 6.25; 6.25 MG/1; MG/1; MG/1; MG/1
25 CAPSULE, EXTENDED RELEASE ORAL DAILY
Qty: 30 CAPSULE | Refills: 0 | Status: SHIPPED | OUTPATIENT
Start: 2025-08-24 | End: 2025-09-23

## 2025-08-04 ENCOUNTER — TELEPHONE (OUTPATIENT)
Age: 55
End: 2025-08-04

## 2025-08-19 ENCOUNTER — OFFICE VISIT (OUTPATIENT)
Age: 55
End: 2025-08-19
Payer: COMMERCIAL

## 2025-08-19 VITALS
BODY MASS INDEX: 30.3 KG/M2 | OXYGEN SATURATION: 98 % | TEMPERATURE: 97.2 F | WEIGHT: 211.2 LBS | RESPIRATION RATE: 16 BRPM | HEART RATE: 78 BPM | DIASTOLIC BLOOD PRESSURE: 80 MMHG | SYSTOLIC BLOOD PRESSURE: 130 MMHG

## 2025-08-19 DIAGNOSIS — Z23 NEED FOR PNEUMOCOCCAL VACCINATION: ICD-10-CM

## 2025-08-19 DIAGNOSIS — R00.2 PALPITATIONS: ICD-10-CM

## 2025-08-19 DIAGNOSIS — G47.01 INSOMNIA DUE TO MEDICAL CONDITION: ICD-10-CM

## 2025-08-19 DIAGNOSIS — Z23 NEED FOR HEPATITIS B VACCINATION: ICD-10-CM

## 2025-08-19 DIAGNOSIS — N95.1 MENOPAUSAL SYMPTOM: ICD-10-CM

## 2025-08-19 DIAGNOSIS — R07.89 CHEST TIGHTNESS: Primary | ICD-10-CM

## 2025-08-19 DIAGNOSIS — Z23 NEED FOR SHINGLES VACCINE: ICD-10-CM

## 2025-08-19 DIAGNOSIS — M54.31 SCIATICA OF RIGHT SIDE: ICD-10-CM

## 2025-08-19 PROCEDURE — 3075F SYST BP GE 130 - 139MM HG: CPT | Performed by: FAMILY MEDICINE

## 2025-08-19 PROCEDURE — 93000 ELECTROCARDIOGRAM COMPLETE: CPT | Performed by: FAMILY MEDICINE

## 2025-08-19 PROCEDURE — 99214 OFFICE O/P EST MOD 30 MIN: CPT | Performed by: FAMILY MEDICINE

## 2025-08-19 PROCEDURE — 90471 IMMUNIZATION ADMIN: CPT | Performed by: FAMILY MEDICINE

## 2025-08-19 PROCEDURE — 90750 HZV VACC RECOMBINANT IM: CPT | Performed by: FAMILY MEDICINE

## 2025-08-19 PROCEDURE — 3079F DIAST BP 80-89 MM HG: CPT | Performed by: FAMILY MEDICINE

## 2025-08-19 PROCEDURE — 90677 PCV20 VACCINE IM: CPT | Performed by: FAMILY MEDICINE

## 2025-08-19 PROCEDURE — 90472 IMMUNIZATION ADMIN EACH ADD: CPT | Performed by: FAMILY MEDICINE

## 2025-08-19 PROCEDURE — 90746 HEPB VACCINE 3 DOSE ADULT IM: CPT | Performed by: FAMILY MEDICINE

## 2025-08-19 RX ORDER — METHYLPREDNISOLONE 4 MG/1
TABLET ORAL
Qty: 1 KIT | Refills: 0 | Status: SHIPPED | OUTPATIENT
Start: 2025-08-19

## 2025-08-19 RX ORDER — TRAZODONE HYDROCHLORIDE 50 MG/1
25-50 TABLET ORAL NIGHTLY
Qty: 30 TABLET | Refills: 1 | Status: SHIPPED | OUTPATIENT
Start: 2025-08-19 | End: 2025-08-19

## 2025-08-19 RX ORDER — ESTROGEN,CON/M-PROGEST ACET 0.625-2.5
1 TABLET ORAL DAILY
Qty: 28 TABLET | Refills: 1 | Status: SHIPPED | OUTPATIENT
Start: 2025-08-19

## 2025-08-20 ENCOUNTER — HOSPITAL ENCOUNTER (OUTPATIENT)
Age: 55
Discharge: HOME | End: 2025-08-22
Payer: COMMERCIAL

## 2025-08-20 DIAGNOSIS — R00.2 PALPITATIONS: ICD-10-CM

## 2025-08-20 PROCEDURE — 93225 XTRNL ECG REC<48 HRS REC: CPT

## 2025-08-26 LAB
ACQUISITION DURATION: NORMAL S
AVERAGE HEART RATE: 91 BPM
HOOKUP DATE: NORMAL
HOOKUP TIME: NORMAL
MAX HEART RATE TIME/DATE: NORMAL
MAX HEART RATE: 136 BPM
MIN HEART RATE TIME/DATE: NORMAL
MIN HEART RATE: 64 BPM
NUMBER OF QRS COMPLEXES: NORMAL
NUMBER OF SUPRAVENTRICULAR COUPLETS: 0
NUMBER OF SUPRAVENTRICULAR ECTOPICS: 5
NUMBER OF SUPRAVENTRICULAR ISOLATED BEATS: 5
NUMBER OF VENTRICULAR BIGEMINAL CYCLES: 0
NUMBER OF VENTRICULAR COUPLETS: 0
NUMBER OF VENTRICULAR ECTOPICS: 5
